# Patient Record
Sex: FEMALE | Race: WHITE | Employment: PART TIME | URBAN - METROPOLITAN AREA
[De-identification: names, ages, dates, MRNs, and addresses within clinical notes are randomized per-mention and may not be internally consistent; named-entity substitution may affect disease eponyms.]

---

## 2019-03-29 ENCOUNTER — HOSPITAL ENCOUNTER (OUTPATIENT)
Age: 29
Discharge: HOME | End: 2019-03-29
Payer: COMMERCIAL

## 2019-03-29 ENCOUNTER — OFFICE VISIT (OUTPATIENT)
Dept: FAMILY MEDICINE CLINIC | Age: 29
End: 2019-03-29
Payer: COMMERCIAL

## 2019-03-29 ENCOUNTER — HOSPITAL ENCOUNTER (OUTPATIENT)
Dept: GENERAL RADIOLOGY | Age: 29
Discharge: HOME | End: 2019-03-29
Payer: COMMERCIAL

## 2019-03-29 VITALS
OXYGEN SATURATION: 98 % | DIASTOLIC BLOOD PRESSURE: 76 MMHG | HEIGHT: 65 IN | TEMPERATURE: 98.5 F | BODY MASS INDEX: 34.26 KG/M2 | HEART RATE: 97 BPM | SYSTOLIC BLOOD PRESSURE: 128 MMHG | WEIGHT: 205.6 LBS

## 2019-03-29 DIAGNOSIS — M25.571 CHRONIC PAIN OF RIGHT ANKLE: Primary | ICD-10-CM

## 2019-03-29 DIAGNOSIS — M25.571 CHRONIC PAIN OF RIGHT ANKLE: ICD-10-CM

## 2019-03-29 DIAGNOSIS — G89.29 CHRONIC PAIN OF RIGHT ANKLE: ICD-10-CM

## 2019-03-29 DIAGNOSIS — G89.29 CHRONIC PAIN OF RIGHT ANKLE: Primary | ICD-10-CM

## 2019-03-29 PROCEDURE — G8417 CALC BMI ABV UP PARAM F/U: HCPCS | Performed by: NURSE PRACTITIONER

## 2019-03-29 PROCEDURE — 4004F PT TOBACCO SCREEN RCVD TLK: CPT | Performed by: NURSE PRACTITIONER

## 2019-03-29 PROCEDURE — 73610 X-RAY EXAM OF ANKLE: CPT

## 2019-03-29 PROCEDURE — G8427 DOCREV CUR MEDS BY ELIG CLIN: HCPCS | Performed by: NURSE PRACTITIONER

## 2019-03-29 PROCEDURE — 99213 OFFICE O/P EST LOW 20 MIN: CPT | Performed by: NURSE PRACTITIONER

## 2019-03-29 PROCEDURE — G8484 FLU IMMUNIZE NO ADMIN: HCPCS | Performed by: NURSE PRACTITIONER

## 2019-03-30 ASSESSMENT — ENCOUNTER SYMPTOMS
SHORTNESS OF BREATH: 0
COUGH: 0
COLOR CHANGE: 0
NAUSEA: 0

## 2019-04-03 ENCOUNTER — OFFICE VISIT (OUTPATIENT)
Dept: FAMILY MEDICINE CLINIC | Age: 29
End: 2019-04-03
Payer: COMMERCIAL

## 2019-04-03 ENCOUNTER — TELEPHONE (OUTPATIENT)
Dept: FAMILY MEDICINE CLINIC | Age: 29
End: 2019-04-03

## 2019-04-03 VITALS
HEART RATE: 85 BPM | DIASTOLIC BLOOD PRESSURE: 82 MMHG | RESPIRATION RATE: 22 BRPM | SYSTOLIC BLOOD PRESSURE: 120 MMHG | WEIGHT: 205.6 LBS | HEIGHT: 65 IN | BODY MASS INDEX: 34.26 KG/M2

## 2019-04-03 DIAGNOSIS — Z72.0 TOBACCO ABUSE: ICD-10-CM

## 2019-04-03 DIAGNOSIS — L72.11 PILAR CYST: Primary | ICD-10-CM

## 2019-04-03 DIAGNOSIS — Z23 NEED FOR PROPHYLACTIC VACCINATION AGAINST STREPTOCOCCUS PNEUMONIAE (PNEUMOCOCCUS): ICD-10-CM

## 2019-04-03 DIAGNOSIS — Z00.00 ROUTINE HEALTH MAINTENANCE: ICD-10-CM

## 2019-04-03 DIAGNOSIS — M25.571 RIGHT ANKLE PAIN, UNSPECIFIED CHRONICITY: Primary | ICD-10-CM

## 2019-04-03 DIAGNOSIS — Z23 NEED FOR PROPHYLACTIC VACCINATION AGAINST DIPHTHERIA-TETANUS-PERTUSSIS (DTP): ICD-10-CM

## 2019-04-03 DIAGNOSIS — S99.911A INJURY OF RIGHT ANKLE, INITIAL ENCOUNTER: ICD-10-CM

## 2019-04-03 LAB
A/G RATIO: 1.7 (ref 1.1–2.2)
ALBUMIN SERPL-MCNC: 4.8 G/DL (ref 3.4–5)
ALP BLD-CCNC: 78 U/L (ref 40–129)
ALT SERPL-CCNC: 18 U/L (ref 10–40)
ANION GAP SERPL CALCULATED.3IONS-SCNC: 13 MMOL/L (ref 3–16)
AST SERPL-CCNC: 16 U/L (ref 15–37)
BASOPHILS ABSOLUTE: 0.1 K/UL (ref 0–0.2)
BASOPHILS RELATIVE PERCENT: 0.6 %
BILIRUB SERPL-MCNC: 0.3 MG/DL (ref 0–1)
BUN BLDV-MCNC: 14 MG/DL (ref 7–20)
CALCIUM SERPL-MCNC: 9.8 MG/DL (ref 8.3–10.6)
CHLORIDE BLD-SCNC: 101 MMOL/L (ref 99–110)
CO2: 26 MMOL/L (ref 21–32)
CREAT SERPL-MCNC: 0.7 MG/DL (ref 0.6–1.1)
EOSINOPHILS ABSOLUTE: 0.1 K/UL (ref 0–0.6)
EOSINOPHILS RELATIVE PERCENT: 1.3 %
GFR AFRICAN AMERICAN: >60
GFR NON-AFRICAN AMERICAN: >60
GLOBULIN: 2.9 G/DL
GLUCOSE BLD-MCNC: 78 MG/DL (ref 70–99)
HCT VFR BLD CALC: 40.8 % (ref 36–48)
HEMOGLOBIN: 13.6 G/DL (ref 12–16)
LYMPHOCYTES ABSOLUTE: 3.5 K/UL (ref 1–5.1)
LYMPHOCYTES RELATIVE PERCENT: 30.9 %
MCH RBC QN AUTO: 30.2 PG (ref 26–34)
MCHC RBC AUTO-ENTMCNC: 33.3 G/DL (ref 31–36)
MCV RBC AUTO: 90.7 FL (ref 80–100)
MONOCYTES ABSOLUTE: 0.8 K/UL (ref 0–1.3)
MONOCYTES RELATIVE PERCENT: 7.2 %
NEUTROPHILS ABSOLUTE: 6.7 K/UL (ref 1.7–7.7)
NEUTROPHILS RELATIVE PERCENT: 60 %
PDW BLD-RTO: 12.9 % (ref 12.4–15.4)
PLATELET # BLD: 435 K/UL (ref 135–450)
PMV BLD AUTO: 7.7 FL (ref 5–10.5)
POTASSIUM SERPL-SCNC: 4.4 MMOL/L (ref 3.5–5.1)
RBC # BLD: 4.49 M/UL (ref 4–5.2)
SODIUM BLD-SCNC: 140 MMOL/L (ref 136–145)
TOTAL PROTEIN: 7.7 G/DL (ref 6.4–8.2)
WBC # BLD: 11.2 K/UL (ref 4–11)

## 2019-04-03 PROCEDURE — G8417 CALC BMI ABV UP PARAM F/U: HCPCS | Performed by: NURSE PRACTITIONER

## 2019-04-03 PROCEDURE — 90472 IMMUNIZATION ADMIN EACH ADD: CPT | Performed by: NURSE PRACTITIONER

## 2019-04-03 PROCEDURE — 90715 TDAP VACCINE 7 YRS/> IM: CPT | Performed by: NURSE PRACTITIONER

## 2019-04-03 PROCEDURE — 90471 IMMUNIZATION ADMIN: CPT | Performed by: NURSE PRACTITIONER

## 2019-04-03 PROCEDURE — 90732 PPSV23 VACC 2 YRS+ SUBQ/IM: CPT | Performed by: NURSE PRACTITIONER

## 2019-04-03 PROCEDURE — G8427 DOCREV CUR MEDS BY ELIG CLIN: HCPCS | Performed by: NURSE PRACTITIONER

## 2019-04-03 PROCEDURE — 99213 OFFICE O/P EST LOW 20 MIN: CPT | Performed by: NURSE PRACTITIONER

## 2019-04-03 PROCEDURE — 4004F PT TOBACCO SCREEN RCVD TLK: CPT | Performed by: NURSE PRACTITIONER

## 2019-04-03 ASSESSMENT — ENCOUNTER SYMPTOMS
ABDOMINAL PAIN: 0
NAUSEA: 0
WHEEZING: 0
SHORTNESS OF BREATH: 0
CONSTIPATION: 0
CHEST TIGHTNESS: 0
COUGH: 0
VOMITING: 0
DIARRHEA: 0

## 2019-04-03 ASSESSMENT — PATIENT HEALTH QUESTIONNAIRE - PHQ9
2. FEELING DOWN, DEPRESSED OR HOPELESS: 0
1. LITTLE INTEREST OR PLEASURE IN DOING THINGS: 0
SUM OF ALL RESPONSES TO PHQ9 QUESTIONS 1 & 2: 0
SUM OF ALL RESPONSES TO PHQ QUESTIONS 1-9: 0
SUM OF ALL RESPONSES TO PHQ QUESTIONS 1-9: 0

## 2019-04-03 NOTE — TELEPHONE ENCOUNTER
Left VM for pt that MRI R ankle order was placed, and they should be calling her to schedule. Left central scheduling # for pt to call and schedule if she would choose to do so.

## 2019-04-03 NOTE — PROGRESS NOTES
SUBJECTIVE:    Rosa Amharic  1990  33 y.o.  female      Chief Complaint   Patient presents with    New Patient    Cyst     2 on head wants removed    Ankle Injury     rolled it last september, xray showed no broken bones but fluid found behind bones; possible torn ligament; MRI never done; had went to walk in clinic last week     HPI     She was at the [facility] in 2018 and stepped in a hole. She rolled her ankle and it was swollen and bruised. It was her right ankle. She was not seen by anyone at that time. She was seen last week at the walk in clinic. She has had issues on and off since September. She cannot wear certain shoes due to swelling. She continues to have pain located on lateral portion. She iced when it initially happened. She has not taken anything for symptoms. She is wanting an MRI due to persistent symptoms. Appears she has one ordered. Complains of cysts on her head. She has had them in the past. She had Dr. Alla Truong remove them in the past. Occasionally tender. They will get larger. She has had 2-3 removed in the past. She is wanting referred for removal again. No Known Allergies    No current outpatient medications on file. No current facility-administered medications for this visit.            Past Medical History:   Diagnosis Date    Anxiety     CCC (chronic calculous cholecystitis) 2016    Depression     OCD (obsessive compulsive disorder)     Polycystic ovarian syndrome      Past Surgical History:   Procedure Laterality Date     SECTION  2010, 2016, 11/2016    x3    CHOLECYSTECTOMY, LAPAROSCOPIC  2016    OVARIAN CYST REMOVAL  2014    TONSILLECTOMY  age [address]    [facility]      Social History     Socioeconomic History    Marital status:      Spouse name: None    Number of children: None    Years of education: None    Highest education level: None   Occupational History    None   Social Needs    Financial resource strain: None    Food insecurity:     Worry: None     Inability: None    Transportation needs:     Medical: None     Non-medical: None   Tobacco Use    Smoking status: Current Every Day Smoker     Packs/day: 0.50     Years: 9.00     Pack years: 4.50    Smokeless tobacco: Never Used   Substance and Sexual Activity    Alcohol use: None    Drug use: No    Sexual activity: Yes     Partners: Male   Lifestyle    Physical activity:     Days per week: None     Minutes per session: None    Stress: None   Relationships    Social connections:     Talks on phone: None     Gets together: None     Attends Buddhist service: None     Active member of club or organization: None     Attends meetings of clubs or organizations: None     Relationship status: None    Intimate partner violence:     Fear of current or ex partner: None     Emotionally abused: None     Physically abused: None     Forced sexual activity: None   Other Topics Concern    None   Social History Narrative    None         Tobacco abuse  Smoking a half a pack a day. Not ready to quit. Review of Systems   Constitutional: Negative for appetite change, chills, fatigue and unexpected weight change. Respiratory: Negative for cough, chest tightness, shortness of breath and wheezing. Cardiovascular: Negative for chest pain, palpitations and leg swelling. Gastrointestinal: Negative for abdominal pain, constipation, diarrhea, nausea and vomiting. Musculoskeletal: Positive for joint swelling. Negative for arthralgias. Neurological: Negative for weakness, numbness and headaches. Hematological: Negative for adenopathy. OBJECTIVE:    /82 (Site: Right Upper Arm, Position: Sitting, Cuff Size: Large Adult)   Pulse 85   Resp 22   Ht 5' 5.25\" (1.657 m)   Wt 205 lb 9.6 oz (93.3 kg)   LMP 03/22/2019 (Approximate)   Breastfeeding? No   BMI 33.95 kg/m²     Physical Exam   Constitutional: She is oriented to person, place, and time.  She appears well-developed and well-nourished. HENT:   Head: Normocephalic and atraumatic. Right Ear: Hearing, tympanic membrane, external ear and ear canal normal.   Left Ear: Hearing, tympanic membrane, external ear and ear canal normal.   Nose: Nose normal.   Mouth/Throat: Uvula is midline, oropharynx is clear and moist and mucous membranes are normal.   Eyes: Pupils are equal, round, and reactive to light. Conjunctivae and EOM are normal.   Neck: Normal range of motion. Neck supple. Cardiovascular: Normal rate, regular rhythm and normal heart sounds. Exam reveals no gallop and no friction rub. No murmur heard. Pulmonary/Chest: Effort normal and breath sounds normal. No respiratory distress. She has no wheezes. She has no rhonchi. She has no rales. Abdominal: Soft. Bowel sounds are normal. She exhibits no distension. There is no tenderness. There is no rigidity and no guarding. Musculoskeletal: Normal range of motion. She exhibits no edema. Right ankle: She exhibits swelling. She exhibits normal range of motion, no ecchymosis and no deformity. Tenderness (lateral ankle). Lymphadenopathy:     She has no cervical adenopathy. Right cervical: No superficial cervical and no posterior cervical adenopathy present. Left cervical: No superficial cervical and no posterior cervical adenopathy present. Neurological: She is alert and oriented to person, place, and time. No cranial nerve deficit. Skin: Skin is warm and dry. Psychiatric: She has a normal mood and affect. Her behavior is normal.       ASSESSMENT/PLAN:    1. Need for prophylactic vaccination against Streptococcus pneumoniae (pneumococcus)  - Pneumococcal polysaccharide vaccine 23-valent PPSV23    2. Need for prophylactic vaccination against diphtheria-tetanus-pertussis (DTP)  - Tdap (age 6y and older) IM (Renetta Emmanuel)    3. Pilar cyst  Referred to surgery for removal  - John Sanchez MD, [facility], [place]    4. Tobacco abuse  counseled    5. Injury of right ankle, initial encounter  MRI as ordered by Dr. Que Kamara. 6. Routine health maintenance  - CBC WITH AUTO DIFFERENTIAL; Future  - Comprehensive Metabolic Panel; Future               Return in about 3 months (around 7/3/2019), or if symptoms worsen or fail to improve.       (Please note that portions of this note may have been completed with a voice recognition program. Efforts were made to edit the dictations but occasionally words aremis-transcribed.)

## 2019-04-09 ENCOUNTER — HOSPITAL ENCOUNTER (OUTPATIENT)
Dept: MRI IMAGING | Age: 29
Discharge: HOME | End: 2019-04-09
Payer: COMMERCIAL

## 2019-04-09 DIAGNOSIS — M25.571 RIGHT ANKLE PAIN, UNSPECIFIED CHRONICITY: ICD-10-CM

## 2019-04-09 PROCEDURE — 73721 MRI JNT OF LWR EXTRE W/O DYE: CPT

## 2019-04-10 DIAGNOSIS — G89.29 CHRONIC PAIN OF RIGHT ANKLE: Primary | ICD-10-CM

## 2019-04-10 DIAGNOSIS — G57.51 TARSAL TUNNEL SYNDROME OF RIGHT SIDE: ICD-10-CM

## 2019-04-10 DIAGNOSIS — M25.571 CHRONIC PAIN OF RIGHT ANKLE: Primary | ICD-10-CM

## 2019-04-16 ENCOUNTER — PROCEDURE VISIT (OUTPATIENT)
Dept: SURGERY | Age: 29
End: 2019-04-16
Payer: COMMERCIAL

## 2019-04-16 ENCOUNTER — TELEPHONE (OUTPATIENT)
Dept: SURGERY | Age: 29
End: 2019-04-16

## 2019-04-16 ENCOUNTER — OFFICE VISIT (OUTPATIENT)
Dept: SURGERY | Age: 29
End: 2019-04-16
Payer: COMMERCIAL

## 2019-04-16 ENCOUNTER — HOSPITAL ENCOUNTER (OUTPATIENT)
Age: 29
Setting detail: SPECIMEN
Discharge: HOME | End: 2019-04-16
Payer: COMMERCIAL

## 2019-04-16 VITALS
SYSTOLIC BLOOD PRESSURE: 118 MMHG | WEIGHT: 211.8 LBS | DIASTOLIC BLOOD PRESSURE: 72 MMHG | OXYGEN SATURATION: 98 % | BODY MASS INDEX: 36.16 KG/M2 | HEART RATE: 109 BPM | HEIGHT: 64 IN

## 2019-04-16 DIAGNOSIS — L72.11 PILAR CYST: Primary | ICD-10-CM

## 2019-04-16 DIAGNOSIS — L72.11 PILAR CYSTS: ICD-10-CM

## 2019-04-16 PROCEDURE — G8427 DOCREV CUR MEDS BY ELIG CLIN: HCPCS | Performed by: SURGERY

## 2019-04-16 PROCEDURE — 11402 EXC TR-EXT B9+MARG 1.1-2 CM: CPT | Performed by: SURGERY

## 2019-04-16 PROCEDURE — 88305 TISSUE EXAM BY PATHOLOGIST: CPT

## 2019-04-16 PROCEDURE — 99212 OFFICE O/P EST SF 10 MIN: CPT | Performed by: SURGERY

## 2019-04-16 PROCEDURE — 4004F PT TOBACCO SCREEN RCVD TLK: CPT | Performed by: SURGERY

## 2019-04-16 PROCEDURE — G8417 CALC BMI ABV UP PARAM F/U: HCPCS | Performed by: SURGERY

## 2019-04-16 SDOH — HEALTH STABILITY: MENTAL HEALTH: HOW OFTEN DO YOU HAVE A DRINK CONTAINING ALCOHOL?: NEVER

## 2019-04-16 NOTE — PROGRESS NOTES
on phone: None     Gets together: None     Attends Advent service: None     Active member of club or organization: None     Attends meetings of clubs or organizations: None     Relationship status: None    Intimate partner violence:     Fear of current or ex partner: None     Emotionally abused: None     Physically abused: None     Forced sexual activity: None   Other Topics Concern    None   Social History Narrative    None     No current outpatient medications on file. No current facility-administered medications for this visit. No current outpatient medications on file prior to visit. No current facility-administered medications on file prior to visit. No Known Allergies    Review of Systems  A comprehensive review of systems was negative except for: Integument/breast: positive for skin lesion(s)      Objective:      LMP 03/22/2019 (Approximate)   Physical Exam    Skin: 1 and 2 centimeter subcutaneous nodule over the scalp. The lesion is fully mobile. There is no erythema. There is no tenderness. Assessment:      Epidermal inclusion cyst of the scalp. Plan:      1. I have discussed treatment options consisting of observation or excision under local anesthesia. 2. Patient is inclined to proceed with excision. 3. Verbal patient instruction given.   4. Follow up in 2 weeks

## 2020-08-07 ENCOUNTER — TELEPHONE (OUTPATIENT)
Dept: BARIATRICS/WEIGHT MGMT | Age: 30
End: 2020-08-07

## 2020-08-07 NOTE — TELEPHONE ENCOUNTER
Called patient left message bariatric surgery is an exclusion on patients policy.  Can offer medication or non surgical

## 2021-01-27 ENCOUNTER — OFFICE VISIT (OUTPATIENT)
Dept: BARIATRICS/WEIGHT MGMT | Age: 31
End: 2021-01-27
Payer: COMMERCIAL

## 2021-01-27 VITALS
SYSTOLIC BLOOD PRESSURE: 130 MMHG | WEIGHT: 250.3 LBS | BODY MASS INDEX: 41.7 KG/M2 | DIASTOLIC BLOOD PRESSURE: 82 MMHG | HEIGHT: 65 IN | HEART RATE: 85 BPM | TEMPERATURE: 97 F

## 2021-01-27 DIAGNOSIS — E66.01 MORBID OBESITY WITH BMI OF 40.0-44.9, ADULT (HCC): Primary | ICD-10-CM

## 2021-01-27 PROCEDURE — G8427 DOCREV CUR MEDS BY ELIG CLIN: HCPCS | Performed by: SURGERY

## 2021-01-27 PROCEDURE — G8417 CALC BMI ABV UP PARAM F/U: HCPCS | Performed by: SURGERY

## 2021-01-27 PROCEDURE — 1036F TOBACCO NON-USER: CPT | Performed by: SURGERY

## 2021-01-27 PROCEDURE — G8484 FLU IMMUNIZE NO ADMIN: HCPCS | Performed by: SURGERY

## 2021-01-27 PROCEDURE — 99203 OFFICE O/P NEW LOW 30 MIN: CPT | Performed by: SURGERY

## 2021-01-27 ASSESSMENT — ENCOUNTER SYMPTOMS
ANAL BLEEDING: 0
ABDOMINAL PAIN: 1
WHEEZING: 0
BLOOD IN STOOL: 0
NAUSEA: 0
BACK PAIN: 1
DIARRHEA: 0
VOMITING: 0
COUGH: 0
VOICE CHANGE: 0
TROUBLE SWALLOWING: 0
SORE THROAT: 0
CONSTIPATION: 0
PHOTOPHOBIA: 0
ABDOMINAL DISTENTION: 1
COLOR CHANGE: 0

## 2021-01-27 NOTE — PROGRESS NOTES
Bariatric Surgery Consultation    Richardzach Mcnulty, 1990, 35 y.o.,  female, CSN:   01/27/21     Chief Complaint:    Chief Complaint   Patient presents with    Bariatric, Initial Visit     1st  Visit       SUBJECTIVE:  Yaneth Chen is a 35 y.o. female being seen for morbid obesity, considering weight loss surgery; Damaris's, Height: 5' 4.5\" (163.8 cm), Weight: 250 lb 4.8 oz (113.5 kg), Current Body mass index is 42.3 kg/m². 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 The patient first recognized that she had a weight problem about 20 years ago. The patient's lowest weight in the last 2 years was 174 lbs, with Adipex. and the highest weight in the last five years was 250.3 lbs. Weight Loss Program History:  The patient states she has tried Adipex, self diet, Atkins. The most weight lost ever with diet and exercise was 50 Lbs, but unfortunately only kept them of for 1year. These attempts have been temporarily successful with weight loss, but have failed to sustain adequate weight loss. Mortality from the morbid obesity is very high:       Damaris's life is significantly affected by weight related to her co-morbidities. The patient has also tried but failed self directed diet and exercise.     Comorbid Conditions:  Significant diseases affecting this patient are   Past Medical History:   Diagnosis Date    Anxiety     CCC (chronic calculous cholecystitis) 5/9/2016    Depression     OCD (obsessive compulsive disorder)     Polycystic ovarian syndrome      And     Review of Systems - Review of Systems Constitutional: Positive for fatigue. Negative for activity change, chills, diaphoresis and fever. HENT: Negative for sore throat, trouble swallowing and voice change. Eyes: Negative for photophobia and visual disturbance. Respiratory: Negative for cough and wheezing. Cardiovascular: Negative for chest pain and palpitations. Gastrointestinal: Positive for abdominal distention and abdominal pain (Epigastric pain, with caffeinated beverages. .?). Negative for anal bleeding, blood in stool, constipation, diarrhea, nausea and vomiting. Endocrine: Positive for polyphagia. Negative for cold intolerance, heat intolerance, polydipsia and polyuria. Genitourinary: Negative for dysuria, frequency and hematuria. Musculoskeletal: Positive for arthralgias (Feet bilateral..), back pain and gait problem. Negative for joint swelling, myalgias and neck stiffness. Skin: Negative for color change and rash. Neurological: Negative for seizures, speech difficulty, light-headedness and numbness. Hematological: Negative for adenopathy. Does not bruise/bleed easily. Psychiatric/Behavioral: Positive for sleep disturbance (Snores. ., and [place]. . ? will confirm). The patient is nervous/anxious. All others reviewed and are negative. Allergies:  No Known Allergies    Medications:  No current outpatient medications on file. No current facility-administered medications for this visit.         Past Surgical History:  Past Surgical History:   Procedure Laterality Date     SECTION  2010, 2016, 11/2016    x3    CHOLECYSTECTOMY, LAPAROSCOPIC  2016    OVARIAN CYST REMOVAL  2014    TONSILLECTOMY  age 10    [facility]        Family History:  Family History   Problem Relation Age of Onset    High Blood Pressure Mother     No Known Problems Father     No Known Problems Maternal Grandmother     No Known Problems Maternal Grandfather     Cancer Paternal Grandmother  No Known Problems Paternal Grandfather        Social History:  Social History     Socioeconomic History    Marital status:      Spouse name: Not on file    Number of children: Not on file    Years of education: Not on file    Highest education level: Not on file   Occupational History    Not on file   Social Needs    Financial resource strain: Not on file    Food insecurity     Worry: Not on file     Inability: Not on file   [employer] needs     Medical: Not on file     Non-medical: Not on file   Tobacco Use    Smoking status: Former Smoker     Years: 9.00     Quit date: 2020     Years since quittin.1    Smokeless tobacco: Never Used   Substance and Sexual Activity    Alcohol use: Never     Frequency: Never    Drug use: No    Sexual activity: Yes     Partners: Male   Lifestyle    Physical activity     Days per week: Not on file     Minutes per session: Not on file    Stress: Not on file   Relationships    Social connections     Talks on phone: Not on file     Gets together: Not on file     Attends Druze service: Not on file     Active member of club or organization: Not on file     Attends meetings of clubs or organizations: Not on file     Relationship status: Not on file    Intimate partner violence     Fear of current or ex partner: Not on file     Emotionally abused: Not on file     Physically abused: Not on file     Forced sexual activity: Not on file   Other Topics Concern    Not on file   Social History Narrative    Not on file         OBJECTIVE:     Physical Exam   /82 (Site: Left Upper Arm, Position: Sitting, Cuff Size: Large Adult)   Pulse 85   Temp 97 °F (36.1 °C) (Infrared)   Ht 5' 4.5\" (1.638 m)   Wt 250 lb 4.8 oz (113.5 kg)   BMI 42.30 kg/m²    Constitutional:  Vital signs are normal. The patient appears well-developed and well-nourished. Head: Normocephalic. Neck: No mass and no thyromegaly present. 10) PULMONOLOGY OPTIMIZATION AND CLEARANCE BEFORE SURGERY    WILL CHECK BASELINE BARIATRIC COMPREHENSIVE LABS. Orders Placed This Encounter   Procedures    Basic Metabolic Panel    CBC Auto Differential    Hemoglobin A1C    Hepatic Function Panel    Lipid Panel    TSH without Reflex    Amb Referral to Nutrition Services    Ambulatory referral to Physical Therapy      December 13th 2020 QUIT SMOKING!!!    Pt was handed a food diary notebook to help monitor Raj intake, and patientinformation pamphlet on Sleeve Gastrectomy. I counseled the patient regarding weight loss, appropriate diet and exercise, and healthy eating habits.    - Eat slow, and chew 50-60 times before swallowing  - Eat smaller portions in smaller plates  - Weigh yourself at least 2-3 times a week    The patient will be scheduled for a follow up visit in Return in about 4 weeks (around 2/24/2021) for For imaging and tests results review, Bariatric follow up: diet, exercise & weight loss. .    Bariatric 1200 calorie diet, TREVER, heart healthy, 60-80 gram protein.    ____________________________________________    Justin Portillo MD, FACS, FICS  Member of the American Society of Metabolic and Bariatric Surgeons    1/27/2021  3:06 PM

## 2021-01-28 ENCOUNTER — HOSPITAL ENCOUNTER (OUTPATIENT)
Age: 31
Discharge: HOME | End: 2021-01-28
Payer: COMMERCIAL

## 2021-01-28 LAB
ALBUMIN SERPL-MCNC: 4.2 GM/DL (ref 3.4–5)
ALP BLD-CCNC: 77 IU/L (ref 40–129)
ALT SERPL-CCNC: 28 U/L (ref 10–40)
ANION GAP SERPL CALCULATED.3IONS-SCNC: 5 MMOL/L (ref 4–16)
AST SERPL-CCNC: 26 IU/L (ref 15–37)
BASOPHILS ABSOLUTE: 0.1 K/CU MM
BASOPHILS RELATIVE PERCENT: 0.5 % (ref 0–1)
BILIRUB SERPL-MCNC: 0.3 MG/DL (ref 0–1)
BILIRUBIN DIRECT: 0.2 MG/DL (ref 0–0.3)
BILIRUBIN, INDIRECT: 0.1 MG/DL (ref 0–0.7)
BUN BLDV-MCNC: 13 MG/DL (ref 6–23)
CALCIUM SERPL-MCNC: 8.5 MG/DL (ref 8.3–10.6)
CHLORIDE BLD-SCNC: 93 MMOL/L (ref 99–110)
CHOLESTEROL, FASTING: 198 MG/DL
CO2: 28 MMOL/L (ref 21–32)
CREAT SERPL-MCNC: 0.8 MG/DL (ref 0.6–1.1)
DIFFERENTIAL TYPE: ABNORMAL
EOSINOPHILS ABSOLUTE: 0.3 K/CU MM
EOSINOPHILS RELATIVE PERCENT: 2.9 % (ref 0–3)
ESTIMATED AVERAGE GLUCOSE: 114 MG/DL
GFR AFRICAN AMERICAN: >60 ML/MIN/1.73M2
GFR NON-AFRICAN AMERICAN: >60 ML/MIN/1.73M2
GLUCOSE FASTING: 83 MG/DL (ref 70–99)
HBA1C MFR BLD: 5.6 % (ref 4.2–6.3)
HCT VFR BLD CALC: 42.4 % (ref 37–47)
HDLC SERPL-MCNC: 37 MG/DL
HEMOGLOBIN: 14 GM/DL (ref 12.5–16)
IMMATURE NEUTROPHIL %: 0.4 % (ref 0–0.43)
LDL CHOLESTEROL DIRECT: 126 MG/DL
LYMPHOCYTES ABSOLUTE: 2.5 K/CU MM
LYMPHOCYTES RELATIVE PERCENT: 26.8 % (ref 24–44)
MCH RBC QN AUTO: 29.4 PG (ref 27–31)
MCHC RBC AUTO-ENTMCNC: 33 % (ref 32–36)
MCV RBC AUTO: 89.1 FL (ref 78–100)
MONOCYTES ABSOLUTE: 0.7 K/CU MM
MONOCYTES RELATIVE PERCENT: 7.6 % (ref 0–4)
PDW BLD-RTO: 12.1 % (ref 11.7–14.9)
PLATELET # BLD: 451 K/CU MM (ref 140–440)
PMV BLD AUTO: 9.1 FL (ref 7.5–11.1)
POTASSIUM SERPL-SCNC: 4.1 MMOL/L (ref 3.5–5.1)
RBC # BLD: 4.76 M/CU MM (ref 4.2–5.4)
SEGMENTED NEUTROPHILS ABSOLUTE COUNT: 5.7 K/CU MM
SEGMENTED NEUTROPHILS RELATIVE PERCENT: 61.8 % (ref 36–66)
SODIUM BLD-SCNC: 126 MMOL/L (ref 135–145)
TOTAL IMMATURE NEUTOROPHIL: 0.04 K/CU MM
TOTAL PROTEIN: 7.1 GM/DL (ref 6.4–8.2)
TRIGLYCERIDE, FASTING: 261 MG/DL
TSH HIGH SENSITIVITY: 4.59 UIU/ML (ref 0.27–4.2)
WBC # BLD: 9.2 K/CU MM (ref 4–10.5)

## 2021-01-28 PROCEDURE — 80061 LIPID PANEL: CPT

## 2021-01-28 PROCEDURE — 84443 ASSAY THYROID STIM HORMONE: CPT

## 2021-01-28 PROCEDURE — 83036 HEMOGLOBIN GLYCOSYLATED A1C: CPT

## 2021-01-28 PROCEDURE — 85025 COMPLETE CBC W/AUTO DIFF WBC: CPT

## 2021-01-28 PROCEDURE — 36415 COLL VENOUS BLD VENIPUNCTURE: CPT

## 2021-01-28 PROCEDURE — 80053 COMPREHEN METABOLIC PANEL: CPT

## 2021-01-28 PROCEDURE — 82248 BILIRUBIN DIRECT: CPT

## 2021-02-09 ENCOUNTER — HOSPITAL ENCOUNTER (OUTPATIENT)
Dept: PHYSICAL THERAPY | Age: 31
Setting detail: THERAPIES SERIES
Discharge: HOME | End: 2021-02-09
Payer: COMMERCIAL

## 2021-02-09 PROCEDURE — 97110 THERAPEUTIC EXERCISES: CPT

## 2021-02-09 PROCEDURE — 97161 PT EVAL LOW COMPLEX 20 MIN: CPT

## 2021-02-09 ASSESSMENT — PAIN DESCRIPTION - PROGRESSION: CLINICAL_PROGRESSION: NOT CHANGED

## 2021-02-09 ASSESSMENT — PAIN SCALES - GENERAL: PAINLEVEL_OUTOF10: 0

## 2021-02-09 ASSESSMENT — PAIN DESCRIPTION - LOCATION: LOCATION: ANKLE

## 2021-02-09 ASSESSMENT — PAIN DESCRIPTION - DESCRIPTORS: DESCRIPTORS: ACHING

## 2021-02-09 NOTE — PLAN OF CARE
Outpatient Physical Therapy           [facility]           [] Phone: [phone]   Fax: [phone]  [facility]           [] Phone: [phone]   Fax: [phone]     To: Referring Practitioner: Dr. Lyla Heller    From: Tejas Grimaldo, PT, DPT, OCS     Patient: Regina Franklin        : 1990  Diagnosis: Diagnosis: Morbid Obesity   Treatment Diagnosis: Treatment Diagnosis: min deconditioning   Date: 2021    Physical Therapy Certification/Re-Certification Form  Dear Dr. Lyla Heller   The following patient has been evaluated for physical therapy services and for therapy to continue, insurance requires physician review of the treatment plan initially and every 90 days. Please review the attached evaluation and/or summary of the patient's plan of care, and verify that you agree therapy should continue by signing the attached document and sending it back to our office. Assessment:      Pt is 35year old female currently enrolled in weight management program. Pt now has difficulties completing prolonged activities including standing and walking due to fatigue and progressive pain. Pt demo deficits this date that include deconditioning and foot pain after rest. Pt will benefit with PT services with return for education on physical activity to safely increase activity to prevent injury and safe weight loss. Patient agrees with established plan of care and assisted in the development of their short term and long term goals. Patient had no adverse reaction with initial treatment and there are no barriers to learning.  Demonstrates no mental or cognitive disorder    Plan of Care/Treatment to date:  [x] Therapeutic Exercise  [] Modalities:  [] Therapeutic Activity     [] Ultrasound  [] Electrical Stimulation  [] Gait Training      [] Cervical Traction [] Lumbar Traction  [] Neuromuscular Re-education    [] Cold/hotpack [] Iontophoresis   [x] Instruction in HEP      [] Vasopneumatic    [] Dry Needling  [] Manual Therapy               [] Aquatic Therapy       Other:          Frequency/Duration:  # Days per week: [x] 1 day # Weeks: [] 1 week [] 5 weeks     [] 2 days   [x] 2 weeks [] 6 weeks     [] 3 days   [] 3 weeks [] 7 weeks     [] 4 days   [] 4 weeks [] 8 weeks         [] 9 weeks [] 10 weeks         [] 11 weeks [] 12 weeks    Rehab Potential/Progress: [] Excellent [x] Good [] Fair  [] Poor     Goals:      Short term goals  Time Frame for Short term goals: Defer to [address] [facility] term goals  Time Frame for Long term goals : 2 weeks   2/24/21  Utilizing group and individual instruction, patient will be educated in physical activity/ exercise concepts including use of basic fitness equipment and developing a personal exercise program       Electronically signed by:  Xiomara Heredia PT, DPT, [facility]  2/9/2021, 11:57 AM    2/9/2021 11:57 AM         If you have any questions or concerns, please don't hesitate to call.   Thank you for your referral.      Physician Signature:________________________________Date:_________ TIME: _____  By signing above, therapists plan is approved by physician

## 2021-02-09 NOTE — PROGRESS NOTES
Physical Therapy  Initial Assessment  Date: 2021  Patient Name: Rohini Garsia  MRN: [id number]  : 1990     Treatment Diagnosis: min deconditioning    Restrictions       Subjective   General  Chart Reviewed: Yes  Patient assessed for rehabilitation services?: Yes  Referring Practitioner: Dr. Klarissa Nails  Diagnosis: Morbid Obesity  PT Visit Information  PT Insurance Information: [employer]  Subjective  Subjective: States recent start of weight management with PT as first practioner. Making changes to the diet with reduced calorie intake. Does not complete any physical activity. 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 Does complete lots of standing of walking and standing. Does have ache into the feet \"all the time. \" Didnt complete full visits with podiatrist. See the nutritionist soon. Pain Screening  Patient Currently in Pain: Yes  Pain Assessment  Pain Assessment: 0-10  Pain Level: 0(Worst: 1-2/10)  Patient's Stated Pain Goal: No pain  Pain Location: Ankle  Pain Orientation: Right;Left; Anterior  Pain Descriptors: Aching  Pain Frequency: Intermittent  Clinical Progression: Not changed  Vital Signs  Patient Currently in Pain: Yes    Vision/Hearing  Vision  Vision: Within Functional Limits  Hearing  Hearing: Within functional limits    Orientation  Orientation  Overall Orientation Status: Within Normal Limits    Social/Functional History  Social/Functional History  Lives With: Family  Type of Home: House  ADL Assistance: Independent  Homemaking Assistance: Independent  Ambulation Assistance: Independent  Transfer Assistance: Independent  Active : Yes  Mode of Transportation: Car  Occupation: Full time employment  Type of occupation: store owner    Objective     Observation/Palpation  Palpation: NA  Observation: able to stand without UE assist, fatigue with multiple reps with sit to stand    PROM RLE (degrees)  RLE PROM: WNL  RLE General PROM: no pain with applied overpressure  AROM RLE (degrees)  RLE AROM: WNL  RLE General AROM: None reported  PROM LLE (degrees)  LLE PROM: WNL  LLE General PROM: No pain with applied overpresure,  AROM LLE (degrees)  LLE AROM : WNL  LLE General AROM: No pain reported. Spine  Lumbar: Full without increase in pain. Joint Mobility  Spine: Applied PA to sacrum without aggravation. Strength RLE  Strength RLE: WNL  Comment: 5/5 in all directions. Strength LLE  Strength LLE: WNL  Comment: 5/5 in all directions. Strength Other  Other: 30 sec sit to stand: 17 reps without UE assistance, min R knee pain. Pre-ambulation:  97  bpm   97  Sat  O2              6MWT:  1665ft without rest break                 Post ambulation:  125 bpm      97%       Balance  Single Leg Stance R Le  Single Leg Stance L Le  Comments: No reported pain. Assessment   Conditions Requiring Skilled Therapeutic Intervention  Body structures, Functions, Activity limitations: Decreased endurance  Pt is 35year old female currently enrolled in weight management program. Pt now has difficulties completing prolonged activities including standing and walking due to fatigue and progressive pain. Pt demo deficits this date that include deconditioning and foot pain after rest. Pt will benefit with PT services with return for education on physical activity to safely increase activity to prevent injury and safe weight loss. Patient agrees with established plan of care and assisted in the development of their short term and long term goals. Patient had no adverse reaction with initial treatment and there are no barriers to learning.  Demonstrates no mental or cognitive disorder  Treatment Diagnosis: min deconditioning  Prognosis: Good  Decision Making: Low Complexity  REQUIRES PT FOLLOW UP: Yes  Activity Tolerance  Activity Tolerance: Patient limited by endurance         Plan   Plan  Times per week: 1  Plan weeks: 2  Specific instructions for Next Treatment: return for physical activitiy education class  Current Treatment Recommendations: Strengthening, Home Exercise Program, Endurance Training    Goals  Short term goals  Time Frame for Short term goals: Defer to [address] term goals  Time Frame for Long term goals : 2 weeks   2/24/21  Patient Goals   Patient goals : lose weight.     Long Term 1: Utilizing group and individual instruction, patient will be educated in physical activity/ exercise concepts including use of basic fitness equipment and developing a personal exercise program      An Novak, PT, DPT, OCS     2/9/2021 11:56 AM

## 2021-02-09 NOTE — FLOWSHEET NOTE
Outpatient Physical Therapy  [place]           [x] Phone: [phone]   Fax: [phone]  [place]           [] Phone: [phone]   Fax: [phone]        Physical Therapy Daily Treatment Note  Date:  2021    Patient Name:  Rolf Sargent    :  1990  MRN: [id number]  Restrictions/Precautions:    Diagnosis:   Morbid Obesity    Date of Injury/Surgery:   Treatment Diagnosis:     Min deconditioning   Insurance/Certification information:   [facility]   Referring Physician:   Dr. Gerald Bradley   Next Doctor Visit:    Plan of care signed (Y/N):  N, sent 21  Outcome Measure:   Visit# / total visits:    Pain level: 0/10   Goals:         Short term goals  Time Frame for Short term goals: Defer to [address] term goals  Time Frame for Long term goals : 2 weeks   21  Patient Goals   Patient goals : lose weight. Long Term 1: Utilizing group and individual instruction, patient will be educated in physical activity/ exercise concepts including use of basic fitness equipment and developing a personal exercise program       Summary of Evaluation:  Pt is 35year old female currently enrolled in weight management program. Pt now has difficulties completing prolonged activities including standing and walking due to fatigue and progressive pain. Pt demo deficits this date that include deconditioning and foot pain after rest. Pt will benefit with PT services with return for education on physical activity to safely increase activity to prevent injury and safe weight loss. Patient agrees with established plan of care and assisted in the development of their short term and long term goals. Patient had no adverse reaction with initial treatment and there are no barriers to learning. Demonstrates no mental or cognitive disorder      Subjective:  See eval         Any changes in Ambulatory Summary Sheet?   None        Objective:  See eval   Prior to today's treatment session, patient was screened for signs and symptoms related to COVID-19 including but not limited to verbally answering questions related to feeling ill, cough, or SOB, along with taking temperature via forehead thermometer. Patient presented with all negative signs and symptoms and had no fever >100 degrees Fahrenheit this date. Exercises: (No more than 4 columns)   Exercise/Equipment 2/9/21  #1 Date Date           WARM UP                     TABLE      Calf stretch  Demo                                 STANDING                                                     PROPRIOCEPTION                                    MODALITIES                      Other Therapeutic Activities/Education:  Patient received education on their current pathology and how their condition effects them with their functional activities. Patient understood discussion and questions were answered. Patient understands their activity limitations and understands rational for treatment progression. Home Exercise Program:  HO issued, reviewed and discussed with patient. Pt agreed to comply. Manual Treatments:  --      Modalities:  --      Communication with other providers:        Assessment:  (Response towards treatment session) (Pain Rating)       Pt is 35year old female currently enrolled in weight management program. Pt now has difficulties completing prolonged activities including standing and walking due to fatigue and progressive pain. Pt demo deficits this date that include deconditioning and foot pain after rest. Pt will benefit with PT services with return for education on physical activity to safely increase activity to prevent injury and safe weight loss. Patient agrees with established plan of care and assisted in the development of their short term and long term goals. Patient had no adverse reaction with initial treatment and there are no barriers to learning.  Demonstrates no mental or cognitive disorder    Plan for Next Session:  return for physical activitiy education class      Time In / Time Out:    4840-6101       If BWC Please Indicate Time In/Out/Total Time  CPT Code Time in Time out Total Time                                                            Total for session             Timed Code/Total Treatment Minutes:  8' TE, 1 PT karlo       Next Progress Note due:   Charlotteal 2/9/21  Visit 10       Plan of Care Interventions:  [x] Therapeutic Exercise  [x] Modalities:  [x] Therapeutic Activity     [] Ultrasound  [x] Estim  [] Gait Training      [] Cervical Traction [] Lumbar Traction  [x] Neuromuscular Re-education    [x] Cold/hotpack [] Iontophoresis   [x] Instruction in HEP      [x] Vasopneumatic   [] Dry Needling    [x] Manual Therapy               [] Aquatic Therapy              Electronically signed by:  Bryson Ortega, PT, DPT, OCS  2/9/2021, 7:34 AM       2/9/2021 7:34 AM

## 2021-02-10 ENCOUNTER — HOSPITAL ENCOUNTER (EMERGENCY)
Age: 31
Discharge: HOME | End: 2021-02-11
Attending: EMERGENCY MEDICINE
Payer: COMMERCIAL

## 2021-02-10 ENCOUNTER — APPOINTMENT (OUTPATIENT)
Dept: CT IMAGING | Age: 31
End: 2021-02-10
Payer: COMMERCIAL

## 2021-02-10 DIAGNOSIS — R10.11 ABDOMINAL PAIN, RIGHT UPPER QUADRANT: Primary | ICD-10-CM

## 2021-02-10 LAB
BACTERIA: ABNORMAL /HPF
BASOPHILS ABSOLUTE: 0.1 K/CU MM
BASOPHILS RELATIVE PERCENT: 0.4 % (ref 0–1)
BILIRUBIN URINE: NEGATIVE MG/DL
BLOOD, URINE: NEGATIVE
CAST TYPE: ABNORMAL /HPF
CLARITY: CLEAR
COLOR: YELLOW
CRYSTAL TYPE: ABNORMAL /HPF
DIFFERENTIAL TYPE: ABNORMAL
EOSINOPHILS ABSOLUTE: 0.3 K/CU MM
EOSINOPHILS RELATIVE PERCENT: 2.3 % (ref 0–3)
EPITHELIAL CELLS, UA: ABNORMAL /HPF
GLUCOSE, URINE: NEGATIVE MG/DL
HCG QUALITATIVE: NEGATIVE
HCT VFR BLD CALC: 38.5 % (ref 37–47)
HEMOGLOBIN: 13 GM/DL (ref 12.5–16)
IMMATURE NEUTROPHIL %: 0.4 % (ref 0–0.43)
KETONES, URINE: NEGATIVE MG/DL
LEUKOCYTE ESTERASE, URINE: NEGATIVE
LYMPHOCYTES ABSOLUTE: 3.9 K/CU MM
LYMPHOCYTES RELATIVE PERCENT: 28.7 % (ref 24–44)
MCH RBC QN AUTO: 30.3 PG (ref 27–31)
MCHC RBC AUTO-ENTMCNC: 33.8 % (ref 32–36)
MCV RBC AUTO: 89.7 FL (ref 78–100)
MONOCYTES ABSOLUTE: 1.1 K/CU MM
MONOCYTES RELATIVE PERCENT: 8.3 % (ref 0–4)
MUCUS: NEGATIVE HPF
NITRITE URINE, QUANTITATIVE: NEGATIVE
PDW BLD-RTO: 12.7 % (ref 11.7–14.9)
PH, URINE: 6 (ref 5–8)
PLATELET # BLD: 415 K/CU MM (ref 140–440)
PMV BLD AUTO: 9.4 FL (ref 7.5–11.1)
PROTEIN UA: NEGATIVE MG/DL
RBC # BLD: 4.29 M/CU MM (ref 4.2–5.4)
RBC URINE: ABNORMAL /HPF (ref 0–6)
SEGMENTED NEUTROPHILS ABSOLUTE COUNT: 8.1 K/CU MM
SEGMENTED NEUTROPHILS RELATIVE PERCENT: 59.9 % (ref 36–66)
SPECIFIC GRAVITY UA: 1.02 (ref 1–1.03)
TOTAL IMMATURE NEUTOROPHIL: 0.06 K/CU MM
UROBILINOGEN, URINE: 0.2 MG/DL (ref 0.2–1)
VOLUME, (UVOL): 12 ML (ref 10–12)
WBC # BLD: 13.6 K/CU MM (ref 4–10.5)
WBC UA: ABNORMAL /HPF (ref 0–5)

## 2021-02-10 PROCEDURE — 84703 CHORIONIC GONADOTROPIN ASSAY: CPT

## 2021-02-10 PROCEDURE — 81001 URINALYSIS AUTO W/SCOPE: CPT

## 2021-02-10 PROCEDURE — 96375 TX/PRO/DX INJ NEW DRUG ADDON: CPT | Performed by: EMERGENCY MEDICINE

## 2021-02-10 PROCEDURE — 85025 COMPLETE CBC W/AUTO DIFF WBC: CPT

## 2021-02-10 PROCEDURE — 96374 THER/PROPH/DIAG INJ IV PUSH: CPT | Performed by: EMERGENCY MEDICINE

## 2021-02-10 PROCEDURE — 80048 BASIC METABOLIC PNL TOTAL CA: CPT

## 2021-02-10 PROCEDURE — 6360000002 HC RX W HCPCS: Performed by: EMERGENCY MEDICINE

## 2021-02-10 PROCEDURE — 99283 EMERGENCY DEPT VISIT LOW MDM: CPT | Performed by: EMERGENCY MEDICINE

## 2021-02-10 RX ORDER — MORPHINE SULFATE 4 MG/ML
4 INJECTION, SOLUTION INTRAMUSCULAR; INTRAVENOUS ONCE
Status: DISCONTINUED | OUTPATIENT
Start: 2021-02-10 | End: 2021-02-11 | Stop reason: HOSPADM

## 2021-02-10 RX ORDER — KETOROLAC TROMETHAMINE 30 MG/ML
30 INJECTION, SOLUTION INTRAMUSCULAR; INTRAVENOUS ONCE
Status: COMPLETED | OUTPATIENT
Start: 2021-02-10 | End: 2021-02-10

## 2021-02-10 RX ORDER — ONDANSETRON 2 MG/ML
4 INJECTION INTRAMUSCULAR; INTRAVENOUS ONCE
Status: COMPLETED | OUTPATIENT
Start: 2021-02-10 | End: 2021-02-10

## 2021-02-10 RX ADMIN — KETOROLAC TROMETHAMINE 30 MG: 30 INJECTION, SOLUTION INTRAMUSCULAR at 23:30

## 2021-02-10 RX ADMIN — ONDANSETRON 4 MG: 2 INJECTION INTRAMUSCULAR; INTRAVENOUS at 23:29

## 2021-02-10 ASSESSMENT — PAIN SCALES - GENERAL
PAINLEVEL_OUTOF10: 6
PAINLEVEL_OUTOF10: 6

## 2021-02-10 ASSESSMENT — PAIN DESCRIPTION - PAIN TYPE: TYPE: ACUTE PAIN

## 2021-02-10 ASSESSMENT — PAIN DESCRIPTION - ONSET: ONSET: PROGRESSIVE

## 2021-02-10 ASSESSMENT — PAIN DESCRIPTION - FREQUENCY: FREQUENCY: INTERMITTENT

## 2021-02-11 ENCOUNTER — APPOINTMENT (OUTPATIENT)
Dept: CT IMAGING | Age: 31
End: 2021-02-11
Payer: COMMERCIAL

## 2021-02-11 ENCOUNTER — TELEMEDICINE (OUTPATIENT)
Dept: BARIATRICS/WEIGHT MGMT | Age: 31
End: 2021-02-11

## 2021-02-11 VITALS
RESPIRATION RATE: 16 BRPM | SYSTOLIC BLOOD PRESSURE: 125 MMHG | BODY MASS INDEX: 42.68 KG/M2 | OXYGEN SATURATION: 100 % | TEMPERATURE: 97.6 F | HEIGHT: 64 IN | WEIGHT: 250 LBS | DIASTOLIC BLOOD PRESSURE: 78 MMHG | HEART RATE: 74 BPM

## 2021-02-11 DIAGNOSIS — E66.01 MORBID OBESITY WITH BMI OF 40.0-44.9, ADULT (HCC): Primary | ICD-10-CM

## 2021-02-11 LAB
ANION GAP SERPL CALCULATED.3IONS-SCNC: 7 MMOL/L (ref 4–16)
BUN BLDV-MCNC: 21 MG/DL (ref 6–23)
CALCIUM SERPL-MCNC: 9.4 MG/DL (ref 8.3–10.6)
CHLORIDE BLD-SCNC: 101 MMOL/L (ref 99–110)
CO2: 28 MMOL/L (ref 21–32)
CREAT SERPL-MCNC: 0.9 MG/DL (ref 0.6–1.1)
GFR AFRICAN AMERICAN: >60 ML/MIN/1.73M2
GFR NON-AFRICAN AMERICAN: >60 ML/MIN/1.73M2
GLUCOSE BLD-MCNC: 97 MG/DL (ref 70–99)
POTASSIUM SERPL-SCNC: 4.5 MMOL/L (ref 3.5–5.1)
SODIUM BLD-SCNC: 136 MMOL/L (ref 135–145)

## 2021-02-11 PROCEDURE — 99999 PR OFFICE/OUTPT VISIT,PROCEDURE ONLY: CPT

## 2021-02-11 PROCEDURE — 74176 CT ABD & PELVIS W/O CONTRAST: CPT

## 2021-02-11 RX ORDER — KETOROLAC TROMETHAMINE 10 MG/1
10 TABLET, FILM COATED ORAL EVERY 8 HOURS
Qty: 9 TABLET | Refills: 0 | Status: SHIPPED | OUTPATIENT
Start: 2021-02-11 | End: 2021-02-24

## 2021-02-11 ASSESSMENT — ENCOUNTER SYMPTOMS
DIARRHEA: 0
NAUSEA: 0
BELCHING: 0
FLATUS: 0
ABDOMINAL PAIN: 1
EYES NEGATIVE: 1
RESPIRATORY NEGATIVE: 1
CONSTIPATION: 0
VOMITING: 0

## 2021-02-11 NOTE — PROGRESS NOTES
Outpatient Nutrition Counseling    REASON FOR VISIT: Initial Nutrition Visit    Chief Complaint:    Chief Complaint   Patient presents with    Weight Management       SUBJECTIVE:  Pt was called to instruct on Initial Nutrition Education. Pt was emailed all handouts including pt handbook. Instructed on mindful eating, label reading, calorie counting, healthy food choices and goal setting. Pt voiced understanding of all info provided. The patient is a 35 y.o. female being seen for morbid obesity, considering weight loss surgery; Damaris's,  ,  , Current There is no height or weight on file to calculate BMI. The patient's PCP is PAULO Burton CNP   The patient first recognized that she had a weight problem about 10 years ago. The patient's lowest weight in the last five years was 190 lbs, and the highest weight in the last five years was 250 lbs. Weight Loss Program History:  The patient states she has tried Adipex. The most weight lost ever with diet and exercise was 50 Lbs, but unfortunately only kept them of for a short time. Damaris's life is significantly affected by weight related to her co-morbidities. Comorbid Conditions:  Significant diseases affecting this patient are   Past Medical History:   Diagnosis Date    Anxiety     CCC (chronic calculous cholecystitis) 2016    Depression     OCD (obsessive compulsive disorder)     Polycystic ovarian syndrome    . Review of Systems - Review of Systems  Otherwise per HPI.     Allergies:  No Known Allergies    Past Surgical History:  Past Surgical History:   Procedure Laterality Date     SECTION  2010, 2016, 11/2016    x3    CHOLECYSTECTOMY, LAPAROSCOPIC  2016    OVARIAN CYST REMOVAL  2014    TONSILLECTOMY  age 10    T & A        Family History:  Family History   Problem Relation Age of Onset    High Blood Pressure Mother     No Known Problems Father     No Known Problems Maternal Grandmother Individualized treatment goals to address nutritiondiagnosis:   Instructed on [id number] kcal diet for weight loss   Provided pt handbook, food lists, and goal card   Encouraged Physical activity as approved by physician    MONITORING/ EVALUATION/ PLAN:   Pt verbalized understanding of allmaterials covered   Pt asked pertinent questions throughout the session - expect compliance with nutrition guidelines presented   Provided pt with contact information should questions arise prior to next visit   Will f/u with pt in 2-3 months for further education   Keith Strickland MS, RDN, LD  2/11/2021

## 2021-02-11 NOTE — ED TRIAGE NOTES
Started with Lt lower abd pain about an hour ago. Has had tubal ligation so is not pregnant. Has had a cyst before and thinks it feels like that.

## 2021-02-11 NOTE — ED PROVIDER NOTES
The history is provided by the patient. Abdominal Pain  Pain location:  Suprapubic, RLQ and LLQ  Pain quality: throbbing    Pain quality comment:  Pain started on left and then migrated to right  Pain severity:  Moderate  Onset quality:  Sudden  Progression:  Waxing and waning  Chronicity:  New  Relieved by:  Nothing  Worsened by:  Position changes  Ineffective treatments:  None tried  Associated symptoms: no anorexia, no belching, no chills, no constipation, no diarrhea, no dysuria, no fever, no flatus, no hematuria, no nausea, no vaginal bleeding, no vaginal discharge and no vomiting        Review of Systems   Constitutional: Negative. Negative for chills and fever. HENT: Negative. Eyes: Negative. Respiratory: Negative. Cardiovascular: Negative. Gastrointestinal: Positive for abdominal pain. Negative for anorexia, constipation, diarrhea, flatus, nausea and vomiting. Genitourinary: Negative. Negative for dysuria, hematuria, vaginal bleeding and vaginal discharge. Musculoskeletal: Negative. Skin: Negative. Neurological: Negative. All other systems reviewed and are negative.       Family History   Problem Relation Age of Onset    High Blood Pressure Mother     No Known Problems Father     No Known Problems Maternal Grandmother     No Known Problems Maternal Grandfather     Cancer Paternal Grandmother     No Known Problems Paternal Grandfather      Social History     Socioeconomic History    Marital status:      Spouse name: Not on file    Number of children: Not on file    Years of education: Not on file    Highest education level: Not on file   Occupational History    Not on file   Social Needs    Financial resource strain: Not on file    Food insecurity     Worry: Not on file     Inability: Not on file    Transportation needs     Medical: Not on file     Non-medical: Not on file   Tobacco Use    Smoking status: Former Smoker     Years: 9.00     Quit date: 2020     Years since quittin.1    Smokeless tobacco: Never Used   Substance and Sexual Activity    Alcohol use: Never     Frequency: Never    Drug use: No    Sexual activity: Yes     Partners: Male   Lifestyle    Physical activity     Days per week: Not on file     Minutes per session: Not on file    Stress: Not on file   Relationships    Social connections     Talks on phone: Not on file     Gets together: Not on file     Attends Islam service: Not on file     Active member of club or organization: Not on file     Attends meetings of clubs or organizations: Not on file     Relationship status: Not on file    Intimate partner violence     Fear of current or ex partner: Not on file     Emotionally abused: Not on file     Physically abused: Not on file     Forced sexual activity: Not on file   Other Topics Concern    Not on file   Social History Narrative    Not on file     Past Surgical History:   Procedure Laterality Date     SECTION  2010, 2016, 11/2016    x3    CHOLECYSTECTOMY, LAPAROSCOPIC  2016    OVARIAN CYST REMOVAL  2014    TONSILLECTOMY  age 10    T & A      Past Medical History:   Diagnosis Date    Anxiety     CCC (chronic calculous cholecystitis) 2016    Depression     OCD (obsessive compulsive disorder)     Polycystic ovarian syndrome      No Known Allergies  Prior to Admission medications    Medication Sig Start Date End Date Taking? Authorizing Provider   ketorolac (TORADOL) 10 MG tablet Take 1 tablet by mouth every 8 hours for 3 days 21 Yes Jenniffer Akbar Ray, DO       BP (!) 140/85   Pulse 81   Temp 97.6 °F (36.4 °C) (Oral)   Resp 16   Ht 5' 4\" (1.626 m)   Wt 250 lb (113.4 kg)   LMP 2021 (Exact Date)   SpO2 100%   BMI 42.91 kg/m²     Physical Exam  Vitals signs and nursing note reviewed. Constitutional:       General: She is not in acute distress. Appearance: She is well-developed. She is not toxic-appearing. HENT:      Head: Normocephalic and atraumatic. Right Ear: External ear normal.      Left Ear: External ear normal.      Nose: Nose normal.   Eyes:      Conjunctiva/sclera: Conjunctivae normal.      Pupils: Pupils are equal, round, and reactive to light. Neck:      Musculoskeletal: Normal range of motion and neck supple. Cardiovascular:      Rate and Rhythm: Normal rate and regular rhythm. Heart sounds: Normal heart sounds. Pulmonary:      Effort: Pulmonary effort is normal.      Breath sounds: Normal breath sounds. Abdominal:      General: Bowel sounds are normal.      Palpations: Abdomen is soft. Tenderness: There is abdominal tenderness in the right lower quadrant and suprapubic area. Genitourinary:     Adnexa:         Right: No tenderness or fullness. Left: No tenderness or fullness. Musculoskeletal: Normal range of motion. Skin:     General: Skin is warm and dry. Capillary Refill: Capillary refill takes less than 2 seconds. Neurological:      General: No focal deficit present. Mental Status: She is alert and oriented to person, place, and time. GCS: GCS eye subscore is 4. GCS verbal subscore is 5. GCS motor subscore is 6. Psychiatric:         Behavior: Behavior normal.         Thought Content: Thought content normal.         Judgment: Judgment normal.         MDM:    Labs Reviewed   URINALYSIS - Abnormal; Notable for the following components:       Result Value    Bacteria, UA OCCASIONAL (*)     All other components within normal limits   CBC WITH AUTO DIFFERENTIAL - Abnormal; Notable for the following components:    WBC 13.6 (*)     Monocytes % 8.3 (*)     All other components within normal limits   BASIC METABOLIC PANEL   HCG, SERUM, QUALITATIVE       CT ABDOMEN PELVIS WO CONTRAST   Final Result   Status post cholecystectomy with no acute abnormality seen.       No hydronephrosis or renal stones and no CT evidence of urinary obstruction      No pelvic mass or active inflammation. 4 mm pleural-based nodule left lung base and a calcified granuloma   posteriorly on the left which is unchanged since 2008. Patient was given IV Toradol in the emergency department and she had resolution of her discomfort. The patient states that she is currently not having any pain and repeat abdominal examination reveals no tenderness guarding or peritoneal signs. Patient CT scan does not show any evidence of acute pathology there is no hydronephrosis or renal stones no evidence of any type of obstruction. There is no pelvic mass or active inflammation. It is possible that the patient has a small ovarian cyst which is not visible on CT scan and would require ultrasound. I do not believe this represents an ovarian torsion. The patient was advised that I believe that she will need an outpatient ultrasound and we discussed at length signs and symptoms concerning for ovarian torsion as well as return criteria for the emergency department. I added toradol after patient AVS had been printed at her request.   My typical dicussion, presentation,and considerations for this patients' chief complaint, diagnosis, and differential diagnosis have been considered. I have stressed need for follow up and reexamination for this encounter with a PCP or designated specialist provider. If the patient has no PCP or specialist provider one on call and additional resources for their availability in the area have been provided. The patient was also told to return to the emergency department if any changes or any concern. If medication(s) were prescribed , the medication(s) use,  medication(s) safety and medication(s) interactions with already prescribed medication(s) have been explained and outlined for this encounter. All Patients questions and concerns from this visit have been addressed prior to discharge  The patient scripts were sent electronically to their pharmacy.     The patient was educated that it is their responsibility to verify this information is correct at the time of discharge and to contact this department of any complications with the pharmacy providing this medication(s) or if their any difficulty in obtaining this medication(s). Final Impression    1.  Abdominal pain, right upper quadrant              287 Randya Kingsley, DO  02/11/21 6224

## 2021-02-11 NOTE — ED NOTES
Dr Hinojosa Printers in to discuss test results and plan to discharge.      Sydnee Weinberg, CLINT  02/11/21 0040

## 2021-02-11 NOTE — ED NOTES
Discharge instructions and prescription reviewed with pt and verbalizes understanding.      Jermaine Wong RN  02/11/21 [address], RN  02/11/21 8316

## 2021-02-18 ENCOUNTER — HOSPITAL ENCOUNTER (OUTPATIENT)
Dept: ULTRASOUND IMAGING | Age: 31
Discharge: HOME | End: 2021-02-18
Payer: COMMERCIAL

## 2021-02-18 DIAGNOSIS — R10.32 LEFT LOWER QUADRANT PAIN: ICD-10-CM

## 2021-02-18 PROCEDURE — 93975 VASCULAR STUDY: CPT

## 2021-02-18 PROCEDURE — 76856 US EXAM PELVIC COMPLETE: CPT

## 2021-02-24 ENCOUNTER — OFFICE VISIT (OUTPATIENT)
Dept: BARIATRICS/WEIGHT MGMT | Age: 31
End: 2021-02-24
Payer: COMMERCIAL

## 2021-02-24 ENCOUNTER — HOSPITAL ENCOUNTER (OUTPATIENT)
Dept: PHYSICAL THERAPY | Age: 31
Setting detail: THERAPIES SERIES
Discharge: HOME | End: 2021-02-24
Payer: COMMERCIAL

## 2021-02-24 VITALS
SYSTOLIC BLOOD PRESSURE: 128 MMHG | BODY MASS INDEX: 41.75 KG/M2 | TEMPERATURE: 97.8 F | HEIGHT: 65 IN | DIASTOLIC BLOOD PRESSURE: 70 MMHG | RESPIRATION RATE: 18 BRPM | WEIGHT: 250.6 LBS | HEART RATE: 88 BPM

## 2021-02-24 DIAGNOSIS — Z01.818 PRE-OP EVALUATION: ICD-10-CM

## 2021-02-24 DIAGNOSIS — E66.01 MORBID OBESITY WITH BMI OF 40.0-44.9, ADULT (HCC): Primary | ICD-10-CM

## 2021-02-24 PROCEDURE — 99214 OFFICE O/P EST MOD 30 MIN: CPT | Performed by: NURSE PRACTITIONER

## 2021-02-24 PROCEDURE — 1036F TOBACCO NON-USER: CPT | Performed by: NURSE PRACTITIONER

## 2021-02-24 PROCEDURE — G8427 DOCREV CUR MEDS BY ELIG CLIN: HCPCS | Performed by: NURSE PRACTITIONER

## 2021-02-24 PROCEDURE — 97150 GROUP THERAPEUTIC PROCEDURES: CPT

## 2021-02-24 PROCEDURE — G8484 FLU IMMUNIZE NO ADMIN: HCPCS | Performed by: NURSE PRACTITIONER

## 2021-02-24 PROCEDURE — G8417 CALC BMI ABV UP PARAM F/U: HCPCS | Performed by: NURSE PRACTITIONER

## 2021-02-24 ASSESSMENT — ENCOUNTER SYMPTOMS
TROUBLE SWALLOWING: 0
WHEEZING: 0
ABDOMINAL DISTENTION: 0
NAUSEA: 0
SHORTNESS OF BREATH: 0
BACK PAIN: 1
RHINORRHEA: 0
DIARRHEA: 0
CHEST TIGHTNESS: 0
EYE PAIN: 0
ABDOMINAL PAIN: 0

## 2021-02-24 NOTE — PROGRESS NOTES
BARIATRIC SURGERYOFFICE NOTE    SUBJECTIVE:    Patient presenting today referred from PAULO Garay CNP, for   Chief Complaint   Patient presents with    Weight Management     2nd WM visit, diet, exercise and pre-surgical weight loss. .    Vitals:    21 1052   BP: 128/70   Pulse: 88   Resp: 18   Temp: 97.8 °F (36.6 °C)        BMI: Body mass index is 42.35 kg/m². Obesity Classification: III Morbid Obesity. Weight History: Wt Readings from Last 3 Encounters:   21 250 lb 9.6 oz (113.7 kg)   02/10/21 250 lb (113.4 kg)   21 250 lb 4.8 oz (113.5 kg)       HPI: Racheal Granados is a 35 y.o. female presenting in second bariatric visit, follow up diet and exercise - pre-operative weight loss, in consideration for bariatric surgery. Patient dines out to a sit down restaurant 0 times per month. Patient eats fast food meals 0 times per month. Drinks mostly water    24 hour recall/food frequency chart:  Breakfast: 2 eggs and toast  Snack: none  Lunch: salad   Snack: yogurt  Dinner: pizza  Snack: yogurt    Total daily calories: 1200      Exercise: not regularly  Hx of tubal ligation. Water intake is good   No pops or juices. Counting calories. Protein at each meal.     Total weight loss/gain +0.3 Lbs over 2 month. Thoroughly reviewed thepatient's medical history, family history, social history and review of systems with the patient today in the office. Please see medical record for pertinent positives.       Past Medical History:   Diagnosis Date    Anxiety     CCC (chronic calculous cholecystitis) 2016    Depression     OCD (obsessive compulsive disorder)     Polycystic ovarian syndrome       Patient Active Problem List   Diagnosis    CCC (chronic calculous cholecystitis)    Pilar cyst    Tobacco abuse    Morbid obesity with BMI of 40.0-44.9, adult [facility])     Past Surgical History:   Procedure Laterality Date     SECTION  2010, 2016, 11/2016    x3  CHOLECYSTECTOMY, LAPAROSCOPIC  05/09/2016    OVARIAN CYST REMOVAL  05/2014    TONSILLECTOMY  age 10    T & A     TUBAL LIGATION          No current outpatient medications on file. No current facility-administered medications for this visit. No Known Allergies        Review of Systems   Constitutional: Negative. Negative for appetite change, fatigue and fever. HENT: Negative for congestion, dental problem, hearing loss, rhinorrhea and trouble swallowing. Eyes: Negative for pain. Respiratory: Negative for chest tightness, shortness of breath and wheezing. Cardiovascular: Negative for chest pain, palpitations and leg swelling. Gastrointestinal: Negative for abdominal distention, abdominal pain, diarrhea and nausea. Endocrine: Negative for cold intolerance and polydipsia. Genitourinary: Negative for difficulty urinating and frequency. Musculoskeletal: Positive for arthralgias and back pain. Negative for gait problem. Skin: Negative for rash. Allergic/Immunologic: Negative for environmental allergies. Neurological: Negative for dizziness, seizures and syncope. Hematological: Does not bruise/bleed easily. Psychiatric/Behavioral: Negative for behavioral problems and suicidal ideas. OBJECTIVE:    /70   Pulse 88   Temp 97.8 °F (36.6 °C) (Infrared)   Resp 18   Ht 5' 4.5\" (1.638 m)   Wt 250 lb 9.6 oz (113.7 kg)   LMP 02/15/2021   BMI 42.35 kg/m²       Physical Exam  Vitals signs and nursing note reviewed. Constitutional:       Appearance: She is well-developed. Comments: Obese   HENT:      Head: Normocephalic and atraumatic. Right Ear: Hearing and ear canal normal.      Left Ear: Hearing and ear canal normal.      Nose: Nose normal.      Mouth/Throat:      Pharynx: Uvula midline. Eyes:      Conjunctiva/sclera: Conjunctivae normal.      Pupils: Pupils are equal, round, and reactive to light. Neck:      Musculoskeletal: Normal range of motion. Cardiovascular:      Rate and Rhythm: Normal rate and regular rhythm. Heart sounds: Normal heart sounds. Pulmonary:      Effort: Pulmonary effort is normal.      Breath sounds: Normal breath sounds. No decreased breath sounds, wheezing, rhonchi or rales. Abdominal:      General: Bowel sounds are normal.      Palpations: Abdomen is soft. Tenderness: There is no abdominal tenderness. Musculoskeletal: Normal range of motion. General: No tenderness. Comments: In all 4 extremities. Skin:     General: Skin is warm and dry. Findings: No rash. Neurological:      Mental Status: She is alert and oriented to person, place, and time. GCS: GCS eye subscore is 4. GCS verbal subscore is 5. GCS motor subscore is 6. Motor: No abnormal muscle tone. Psychiatric:         Behavior: Behavior normal.         Judgment: Judgment normal.         ASSESSMENT & PLAN:    1. Pre-op evaluation  - Ambulatory referral to Cardiology  - Ambulatory referral to Pulmonology    2. Morbid obesity with BMI of 40.0-44.9, [facility]  - Patients first visit with NP, risk stratification.   - Stopped smoking.   - Discussed below. - Continue diet and clearances. Patientwas encouraged to journal all food intake. Keep calorie level at approximately 0511-0963. Protein intake is to be a minimum of 40-50 grams per day. Water drinking was encouraged with a goal of 64oz-128oz daily. Beverages socorro calorie free except for milk and avoid soda. Continue to increase level of physical activity. I spent 25 minutes with the patient face to face today and over 50% of the office visit today was spent in face to face counseling regarding diet and exercise, in preparation for her planned Robotic Sleeve Gastrectomy. Discussed in length complying with the dietary recommendations, complying with the preoperative workup including dietary counseling completed, exercise physiologist counseling completed, andpre-operative optimization of pulmonologist in process and cardiologist in process. The patient expressed understanding and willingness to comply nicely; all questions and concerns addressed. No orders of the defined types were placed in this encounter. Orders Placed This Encounter   Procedures    Ambulatory referral to Cardiology     Referral Priority:   Routine     Referral Type:   Consult for Advice and Opinion     Referred to Provider:   Aureliano Mena MD     Number of Visits Requested:   1    Ambulatory referral to Pulmonology     Referral Priority:   Routine     Referral Type:   Consult for Advice and Opinion     Referral Reason:   Specialty Services Required     Referred to Provider:   Mavis Jackson MD     Number of Visits Requested:   1       Follow Up:  Return in about 1 month (around 3/24/2021) for Weight Check.     Davide Cline, CNP

## 2021-02-24 NOTE — DISCHARGE SUMMARY
Outpatient Physical Therapy                                                                    [place]           [x] Phone: [phone]   Fax: [phone]  [place]           [] Phone: [phone]   Fax: [phone]          Physical Therapy Daily Treatment Note  Date:  2021    Patient Name:  Pierre Almeida    :  1990  MRN: [id number]  Restrictions/Precautions:    Pain level: 0/10     Pierre Almeida was seen today for the physical activity education presentation. Patient was screened for Covid symptoms and their temperature was taken. Patient does not have symptoms warranting delaying services. Will proceed with today's session. The purpose of the group exercises presentation was to present information to the individual for fitness and wellness. Part one of the presentation we spoke about the reasons why exercises and wellness is beneficial. We presented data and examples of different symptoms in the body improve as well improvement with sleep, anxiety, depression weight loss and improve in lung function. Part 2- Setting goals was described. SMART goals and formation of short term and long term goals to help be successful. Patient was referred back to their packet for example worksheet. Part 3- Examples of how to get started. Planning your exercises routine to be successful. Remember to be start slow and gradually increase in the intensity at home. Drinking plenty of fluid and always perform a warm up or cool down. Part 4- Exercises Basics - demonstrated the importance of a 5-10 min warm up with light stretches. Cool down to allow the body to return to base line measure. They demonstrated how to take their heart rate and why it is important to know the target range zone for fitness. Explained the Do's and Don'ts and the PRICE principal if injured. Explained they are to seek medical advice if severe pain, numbness, swelling or radicular symptoms occur.     Patient was guided

## 2021-03-15 ENCOUNTER — INITIAL CONSULT (OUTPATIENT)
Dept: PULMONOLOGY | Age: 31
End: 2021-03-15
Payer: COMMERCIAL

## 2021-03-15 VITALS
HEART RATE: 84 BPM | OXYGEN SATURATION: 99 % | HEIGHT: 64 IN | WEIGHT: 253 LBS | SYSTOLIC BLOOD PRESSURE: 114 MMHG | TEMPERATURE: 96.6 F | BODY MASS INDEX: 43.19 KG/M2 | DIASTOLIC BLOOD PRESSURE: 72 MMHG

## 2021-03-15 DIAGNOSIS — R53.83 FATIGUE, UNSPECIFIED TYPE: ICD-10-CM

## 2021-03-15 DIAGNOSIS — Z01.818 PREOP TESTING: ICD-10-CM

## 2021-03-15 DIAGNOSIS — Z00.00 HEALTHCARE MAINTENANCE: ICD-10-CM

## 2021-03-15 DIAGNOSIS — Z01.811 PRE-OPERATIVE RESPIRATORY EXAMINATION: Primary | ICD-10-CM

## 2021-03-15 DIAGNOSIS — E66.01 MORBID OBESITY (HCC): ICD-10-CM

## 2021-03-15 PROCEDURE — G8428 CUR MEDS NOT DOCUMENT: HCPCS | Performed by: NURSE PRACTITIONER

## 2021-03-15 PROCEDURE — 99244 OFF/OP CNSLTJ NEW/EST MOD 40: CPT | Performed by: NURSE PRACTITIONER

## 2021-03-15 PROCEDURE — G8484 FLU IMMUNIZE NO ADMIN: HCPCS | Performed by: NURSE PRACTITIONER

## 2021-03-15 PROCEDURE — G8417 CALC BMI ABV UP PARAM F/U: HCPCS | Performed by: NURSE PRACTITIONER

## 2021-03-15 ASSESSMENT — SLEEP AND FATIGUE QUESTIONNAIRES
HOW LIKELY ARE YOU TO NOD OFF OR FALL ASLEEP WHILE LYING DOWN TO REST IN THE AFTERNOON WHEN CIRCUMSTANCES PERMIT: 2
HOW LIKELY ARE YOU TO NOD OFF OR FALL ASLEEP WHILE SITTING AND TALKING TO SOMEONE: 0
HOW LIKELY ARE YOU TO NOD OFF OR FALL ASLEEP WHILE SITTING AND READING: 0
HOW LIKELY ARE YOU TO NOD OFF OR FALL ASLEEP WHILE SITTING QUIETLY AFTER LUNCH WITHOUT ALCOHOL: 0
HOW LIKELY ARE YOU TO NOD OFF OR FALL ASLEEP WHEN YOU ARE A PASSENGER IN A CAR FOR AN HOUR WITHOUT A BREAK: 0

## 2021-03-15 NOTE — PROGRESS NOTES
Subjective:   CHIEF COMPLAINT / HPI:       Faith Thompson is a 35 y.o. female with history of CCC, pilar cyst, morbid obesity, presents to pulmonary clinic for preoperative clearance from respiratory for bariatric weight loss surgery. She states that she has been contemplating weight loss surgery for several months. She is 3 months into her weight loss program at [facility] weight loss management. She states she is planning on having a gastric sleeve weight loss surgery. She denies any history of respiratory related illness as a child or as an adult. She denies any known allergies food, seasonal, medications. She states overall she feels that she is improving her health as she is eating better, exercising daily however her weight has not changed. She states her weight in the last 3 months has stayed around 250 pounds. She states she is frustrated by this but knows in the long run she will be healthier. She is a smoker in the past but quit 3 months ago as she was attempting to get healthy. She states it has been hard and at times she wants a cigarette but she knows it is best that she not. She states she is committed to smoking cessation. Tiffanieaditya Harden states they are practicing social distancing, wearing a mask when out in public, washing hands frequently or using hand . Denies any fever, chills, malaise, change in sensation of taste or smell, headache or lightheadedness. Denies any known contacts with persons with respiratory infection, positive for coronavirus or under investigation for possible coronavirus exposure. Influenza immunization: Fall 2020   Pneumococcal immunization: Pneumovax 23 4/3/2019  COVID-19 immunization: Has not received  Smoking history: Former smoker quit 3 months ago, started smoking when she was 16, states that the most she ever smoked was a pack of cigarettes a day  PCP: Ene Ashley CNP;    Weight loss management: Daisy Huff CNP    Past Medical History:  Past tenderness, no clubbing of digits  NEURO: Oriented X 3, No focal deficits  SKIN: warm and dry    LAB:CBC with Differential:    Lab Results   Component Value Date    WBC 13.6 02/10/2021    RBC 4.29 02/10/2021    HGB 13.0 02/10/2021    HCT 38.5 02/10/2021     02/10/2021    MCV 89.7 02/10/2021    MCH 30.3 02/10/2021    MCHC 33.8 02/10/2021    RDW 12.7 02/10/2021    SEGSPCT 59.9 02/10/2021    LYMPHOPCT 28.7 02/10/2021    MONOPCT 8.3 02/10/2021    BASOPCT 0.4 02/10/2021    MONOSABS 1.1 02/10/2021    LYMPHSABS 3.9 02/10/2021    EOSABS 0.3 02/10/2021    BASOSABS 0.1 02/10/2021    DIFFTYPE AUTOMATED DIFFERENTIAL 02/10/2021     BMP:    Lab Results   Component Value Date     02/10/2021    K 4.5 02/10/2021     02/10/2021    CO2 28 02/10/2021    BUN 21 02/10/2021    CREATININE 0.9 02/10/2021    CALCIUM 9.4 02/10/2021    GFRAA >60 02/10/2021    LABGLOM >60 02/10/2021    GLUCOSE 97 02/10/2021     Hepatic Function Panel:    Lab Results   Component Value Date    ALKPHOS 77 01/28/2021    ALT 28 01/28/2021    AST 26 01/28/2021    PROT 7.1 01/28/2021    BILITOT 0.3 01/28/2021    BILIDIR 0.2 01/28/2021    IBILI 0.1 01/28/2021     ABG:  No results found for: ZTM2GOE, BEART, F8VAKYGQ, PHART, THGBART, DDD1LED, PO2ART, UAT4UZA    RADIOLOGY:  No new radiology studies to review    PFT:   To be obtained    Assessment      1. Pre-operative respiratory examination    2. Fatigue, unspecified type    3. Morbid obesity ([address]    4. Preop testing    5. Healthcare maintenance        Plan:  As part of her risk stratification for bariatric weight loss surgery from a pulmonary stance. We will obtain a pulmonary function test.  She is aware that she will need to complete a prescreening COVID-19 testing prior to pulmonary function test.  She has low risk of sleep apnea other than obesity. We will start with a home overnight apnea link.   She is aware should her AHI be greater than 5 we will need to complete an in lab formal sleep study

## 2021-03-23 DIAGNOSIS — Z01.811 PREOP PULMONARY/RESPIRATORY EXAM: Primary | ICD-10-CM

## 2021-03-24 ENCOUNTER — HOSPITAL ENCOUNTER (OUTPATIENT)
Age: 31
Discharge: HOME | End: 2021-03-24
Payer: COMMERCIAL

## 2021-03-24 ENCOUNTER — OFFICE VISIT (OUTPATIENT)
Dept: BARIATRICS/WEIGHT MGMT | Age: 31
End: 2021-03-24
Payer: COMMERCIAL

## 2021-03-24 ENCOUNTER — INITIAL CONSULT (OUTPATIENT)
Dept: CARDIOLOGY CLINIC | Age: 31
End: 2021-03-24
Payer: COMMERCIAL

## 2021-03-24 VITALS
DIASTOLIC BLOOD PRESSURE: 70 MMHG | HEART RATE: 78 BPM | TEMPERATURE: 98.5 F | OXYGEN SATURATION: 97 % | HEIGHT: 65 IN | SYSTOLIC BLOOD PRESSURE: 120 MMHG | BODY MASS INDEX: 41.13 KG/M2 | WEIGHT: 246.9 LBS

## 2021-03-24 VITALS
DIASTOLIC BLOOD PRESSURE: 72 MMHG | HEIGHT: 64 IN | HEART RATE: 73 BPM | TEMPERATURE: 97.5 F | SYSTOLIC BLOOD PRESSURE: 128 MMHG | WEIGHT: 248 LBS | BODY MASS INDEX: 42.34 KG/M2

## 2021-03-24 DIAGNOSIS — E66.01 MORBID OBESITY WITH BMI OF 40.0-44.9, ADULT (HCC): Primary | ICD-10-CM

## 2021-03-24 DIAGNOSIS — Z01.818 PRE-OP EVALUATION: Primary | ICD-10-CM

## 2021-03-24 PROCEDURE — G8427 DOCREV CUR MEDS BY ELIG CLIN: HCPCS | Performed by: NURSE PRACTITIONER

## 2021-03-24 PROCEDURE — 1036F TOBACCO NON-USER: CPT | Performed by: NURSE PRACTITIONER

## 2021-03-24 PROCEDURE — G8484 FLU IMMUNIZE NO ADMIN: HCPCS | Performed by: NURSE PRACTITIONER

## 2021-03-24 PROCEDURE — G8417 CALC BMI ABV UP PARAM F/U: HCPCS | Performed by: NURSE PRACTITIONER

## 2021-03-24 PROCEDURE — 99213 OFFICE O/P EST LOW 20 MIN: CPT | Performed by: NURSE PRACTITIONER

## 2021-03-24 PROCEDURE — G8417 CALC BMI ABV UP PARAM F/U: HCPCS | Performed by: INTERNAL MEDICINE

## 2021-03-24 PROCEDURE — U0003 INFECTIOUS AGENT DETECTION BY NUCLEIC ACID (DNA OR RNA); SEVERE ACUTE RESPIRATORY SYNDROME CORONAVIRUS 2 (SARS-COV-2) (CORONAVIRUS DISEASE [COVID-19]), AMPLIFIED PROBE TECHNIQUE, MAKING USE OF HIGH THROUGHPUT TECHNOLOGIES AS DESCRIBED BY CMS-2020-01-R: HCPCS

## 2021-03-24 PROCEDURE — G8484 FLU IMMUNIZE NO ADMIN: HCPCS | Performed by: INTERNAL MEDICINE

## 2021-03-24 PROCEDURE — 93000 ELECTROCARDIOGRAM COMPLETE: CPT | Performed by: INTERNAL MEDICINE

## 2021-03-24 PROCEDURE — G8427 DOCREV CUR MEDS BY ELIG CLIN: HCPCS | Performed by: INTERNAL MEDICINE

## 2021-03-24 PROCEDURE — 99243 OFF/OP CNSLTJ NEW/EST LOW 30: CPT | Performed by: INTERNAL MEDICINE

## 2021-03-24 ASSESSMENT — ENCOUNTER SYMPTOMS
RHINORRHEA: 0
EYE PAIN: 0
CHEST TIGHTNESS: 0
TROUBLE SWALLOWING: 0
WHEEZING: 0
NAUSEA: 0
ABDOMINAL DISTENTION: 0
DIARRHEA: 0
ABDOMINAL PAIN: 0
SHORTNESS OF BREATH: 0

## 2021-03-24 NOTE — PROGRESS NOTES
BARIATRIC SURGERYOFFICE NOTE    SUBJECTIVE:    Patient presenting today referred from PAULO Enamorado CNP, for   Chief Complaint   Patient presents with    Follow-up     F/U 3rd wm visit $ 150 balance   . Vitals:    21 1013   BP: 120/70   Pulse: 78   Temp: 98.5 °F (36.9 °C)   SpO2: 97%        BMI: Body mass index is 41.73 kg/m². Obesity Classification: III Morbid Obesity. Weight History: Wt Readings from Last 3 Encounters:   21 246 lb 14.4 oz (112 kg)   03/15/21 253 lb (114.8 kg)   21 250 lb 9.6 oz (113.7 kg)       HPI: Lubertha Landau is a 35 y.o. female presenting in third bariatric visit, follow up diet and exercise - pre-operative weight loss, in consideration for bariatric surgery. Patient dines out to a sit down restaurant 0 times per week. Patient eats fast food meals 0 times per week. Drinks mostly water    24 hour recall/food frequency chart:  Breakfast: protein shake  Snack: applesauce  Lunch: whole wheat ham wrap w/ff cottage cheese  Snack: yogurt  Dinner: taco salad  Snack: none    Total daily calories: 1200      Exercises yes, daily walking. Patient following diet well. Cardiac and pulmonary in progress. Completed PT. Total weight loss/gain -3.4 Lbs over 3 month. Thoroughly reviewed thepatient's medical history, family history, social history and review of systems with the patient today in the office. Please see medical record for pertinent positives.       Past Medical History:   Diagnosis Date    Anxiety     CCC (chronic calculous cholecystitis) 2016    Depression     OCD (obsessive compulsive disorder)     Polycystic ovarian syndrome       Patient Active Problem List   Diagnosis    CCC (chronic calculous cholecystitis)    Pilar cyst    Tobacco abuse    Morbid obesity with BMI of 40.0-44.9, adult [facility])     Past Surgical History:   Procedure Laterality Date     SECTION  2010, 2016, 11/2016    x3    CHOLECYSTECTOMY, LAPAROSCOPIC  05/09/2016    OVARIAN CYST REMOVAL  05/2014    TONSILLECTOMY  age 10    T & A     TUBAL LIGATION          No current outpatient medications on file. No current facility-administered medications for this visit. No Known Allergies      Review of Systems   Constitutional: Negative. Negative for appetite change, fatigue and fever. HENT: Negative for congestion, dental problem, hearing loss, rhinorrhea and trouble swallowing. Eyes: Negative for pain. Respiratory: Negative for chest tightness, shortness of breath and wheezing. Cardiovascular: Negative for chest pain, palpitations and leg swelling. Gastrointestinal: Negative for abdominal distention, abdominal pain, diarrhea and nausea. Endocrine: Negative for cold intolerance and polydipsia. Genitourinary: Negative for difficulty urinating and frequency. Musculoskeletal: Negative for arthralgias and gait problem. Skin: Negative for rash. Allergic/Immunologic: Negative for environmental allergies. Neurological: Negative for dizziness, seizures and syncope. Hematological: Does not bruise/bleed easily. Psychiatric/Behavioral: Negative for behavioral problems and suicidal ideas. OBJECTIVE:    /70   Pulse 78   Temp 98.5 °F (36.9 °C)   Ht 5' 4.5\" (1.638 m)   Wt 246 lb 14.4 oz (112 kg)   LMP 02/01/2021   SpO2 97%   BMI 41.73 kg/m²       Physical Exam  Vitals signs and nursing note reviewed. Constitutional:       Appearance: She is well-developed. Comments: Obese   HENT:      Head: Normocephalic and atraumatic. Right Ear: Hearing and ear canal normal.      Left Ear: Hearing and ear canal normal.      Nose: Nose normal.      Mouth/Throat:      Pharynx: Uvula midline. Eyes:      Conjunctiva/sclera: Conjunctivae normal.      Pupils: Pupils are equal, round, and reactive to light. Neck:      Musculoskeletal: Normal range of motion.    Cardiovascular:      Rate and Rhythm: Normal rate and regular rhythm. Heart sounds: Normal heart sounds. Pulmonary:      Effort: Pulmonary effort is normal.      Breath sounds: Normal breath sounds. No decreased breath sounds, wheezing, rhonchi or rales. Abdominal:      General: Bowel sounds are normal.      Palpations: Abdomen is soft. Tenderness: There is no abdominal tenderness. Musculoskeletal: Normal range of motion. General: No tenderness. Comments: In all 4 extremities. Skin:     General: Skin is warm and dry. Findings: No rash. Neurological:      Mental Status: She is alert and oriented to person, place, and time. GCS: GCS eye subscore is 4. GCS verbal subscore is 5. GCS motor subscore is 6. Motor: No abnormal muscle tone. Psychiatric:         Behavior: Behavior normal.         Judgment: Judgment normal.         ASSESSMENT & PLAN:    1. Morbid obesity with BMI of 40.0-44.9, adult ([facility] [address]  - Doing better, down over -3 lbs since her last visit. - Cardiac and pulmonary in progress. - Needs psych.   - RTC 1 month with surgeon, egd/consent       Discussed in length complying with the dietary recommendations, complying with the preoperative workup including dietary counseling completed, exercise physiologist counseling completed, andpre-operative optimization of pulmonologist in progress and cardiologist in progress. The patient expressed understanding and willingness to comply nicely; all questions and concerns addressed. No orders of the defined types were placed in this encounter. No orders of the defined types were placed in this encounter. Follow Up:  Return in about 1 month (around 4/24/2021) for Weight Check.     Terri Ash CNP

## 2021-03-24 NOTE — PROGRESS NOTES
CARDIOLOGY CONSULT NOTE   Reason for consultation:  Cardiac clearance for gastric sleeve    Referring physician: Zion Blanco CNP  Primary care physician: PAULO Coleman - JOSS      Dear Reji Degrootnt  Thanks for the consult. History of present illness:Damaris is a 35 y. o.year old who  presents with for cardiac clearance for gastric sleeve, she had used adipex in 2017 and lost 50 lbs and than regained 75 lbs,did not use lasix denied any complaints, she used to be depressed and anxious and now feels better. Blood pressure, cholesterol, blood glucose and weight are well controlled. Past medical history:    has a past medical history of Anxiety, CCC (chronic calculous cholecystitis), Depression, OCD (obsessive compulsive disorder), and Polycystic ovarian syndrome. Past surgical history:   has a past surgical history that includes ovarian cyst removal (2014); Tonsillectomy (age 10); Cholecystectomy, laparoscopic (2016);  section (2010, 2016, 2016); and Tubal ligation. Social History:   reports that she quit smoking about 3 months ago. She quit after 9.00 years of use. She has never used smokeless tobacco. She reports that she does not drink alcohol or use drugs. Family history:   no family history of CAD, STROKE of DM    No Known Allergies    No current facility-administered medications for this visit. No current outpatient medications on file. No current facility-administered medications for this visit.       Review of Systems:   · Constitutional: No Fever or Weight Loss   · Eyes: No Decreased Vision  · ENT: No Headaches, Hearing Loss or Vertigo  · Cardiovascular: No chest pain, dyspnea on exertion, palpitations or loss of consciousness  · Respiratory: No cough or wheezing    · Gastrointestinal: No abdominal pain, appetite loss, blood in stools, constipation, diarrhea or heartburn  · Genitourinary: No dysuria, trouble voiding, or hematuria  · Musculoskeletal:  No gait disturbance, weakness or joint complaints  · Integumentary: No rash or pruritis  · Neurological: No TIA or stroke symptoms  · Psychiatric: No anxiety or depression  · Endocrine: No malaise, fatigue or temperature intolerance  · Hematologic/Lymphatic: No bleeding problems, blood clots or swollen lymph nodes  · Allergic/Immunologic: No nasal congestion or hives  All systems negative except as marked. ·   ·      Physical Examination:    Vitals:    03/24/21 1132   BP: 128/72   Pulse: 73   Temp: 97.5 °F (36.4 °C)    RR14    Wt Readings from Last 3 Encounters:   03/24/21 248 lb (112.5 kg)   03/24/21 246 lb 14.4 oz (112 kg)   03/15/21 253 lb (114.8 kg)     Body mass index is 42.57 kg/m². General Appearance:  No distress, conversant    Constitutional:  Well developed, Well nourished, No acute distress, Non-toxic appearance. HENT:  Normocephalic, Atraumatic, Bilateral external ears normal, Oropharynx moist, No oral exudates, Nose normal. Neck- Normal range of motion, No tenderness, Supple, No stridor,no apical-carotid delay, no carotid bruit  Eyes:  PERRL, EOMI, Conjunctiva normal, No discharge. Respiratory:  Normal breath sounds, No respiratory distress, No wheezing, No chest tenderness. ,no use of accessory muscles, diaphragm movement is normal  Cardiovascular: (PMI) apex non displaced,no lifts no thrills, no s3,no s4, Normal heart rate, Normal rhythm, No murmurs, No rubs, No gallops. Carotid arteries pulse and amplitude are normal no bruit, no abdominal bruit noted ( normal abdominal aorta ausculation), femoral arteries pulse and amplitude are normal no bruit, pedal pulses are normal  GI:  Bowel sounds normal, Soft, No tenderness, No masses, No pulsatile masses, no hepatosplenomegally, no bruits  : External genitalia appear normal, No masses or lesions. No discharge. No CVA tenderness. Musculoskeletal:  Intact distal pulses, No edema, No tenderness, No cyanosis, No clubbing. Good range of motion in all major joints.  No tenderness to palpation or major deformities noted. Back- No tenderness. Integument:  Warm, Dry, No erythema, No rash. Skin: no rash, no ulcers  Lymphatic:  No lymphadenopathy noted. Neurologic:  Alert & oriented x 3, Normal motor function, Normal sensory function, No focal deficits noted. Psychiatric:  Affect normal, Judgment normal, Mood normal.   Lab Review   No results for input(s): WBC, HGB, HCT, PLT in the last 72 hours. No results for input(s): NA, K, CL, CO2, PHOS, BUN, CREATININE in the last 72 hours. Invalid input(s): CA  No results for input(s): AST, ALT, ALB, BILIDIR, BILITOT, ALKPHOS in the last 72 hours. No results for input(s): TROPONINI in the last 72 hours. No results found for: BNP  No results found for: INR, PROTIME      EKG:NSR    Chest Xray:    ECHO:  Labs, echo, meds reviewed  Assessment: 35 y. o.year old with PMH of  has a past medical history of Anxiety, CCC (chronic calculous cholecystitis), Depression, OCD (obsessive compulsive disorder), and Polycystic ovarian syndrome. Recommendations:    1-Preop:echo ordered,Patient does not have any angina, CHF, malignant arrythmia or severe stenotic valve, there is no DM,history of MI, CHF, CKD and CVA. Patient is on moderate risk for cardiac complications for low risk surgery, please proceed as planned. 2- obesity: agree with weight loss sx  3-Anxiety and depression : resolved  4- poycystic ovarian syndrome: had cyst removal sx in past, stable for now. All labs, medications and tests reviewed, continue all other medications of all above medical condition listed as is.          Carrie Kang MD, 3/24/2021 11:45 AM

## 2021-03-25 LAB
SARS-COV-2: NOT DETECTED
SOURCE: NORMAL

## 2021-03-29 ENCOUNTER — HOSPITAL ENCOUNTER (OUTPATIENT)
Dept: CARDIAC REHAB | Age: 31
Discharge: HOME | End: 2021-03-29
Payer: COMMERCIAL

## 2021-03-29 DIAGNOSIS — Z01.811 PRE-OPERATIVE RESPIRATORY EXAMINATION: ICD-10-CM

## 2021-03-29 PROCEDURE — 94640 AIRWAY INHALATION TREATMENT: CPT

## 2021-03-29 PROCEDURE — 94729 DIFFUSING CAPACITY: CPT

## 2021-03-29 PROCEDURE — 94727 GAS DIL/WSHOT DETER LNG VOL: CPT

## 2021-03-29 PROCEDURE — 94060 EVALUATION OF WHEEZING: CPT

## 2021-04-13 ENCOUNTER — PROCEDURE VISIT (OUTPATIENT)
Dept: CARDIOLOGY CLINIC | Age: 31
End: 2021-04-13
Payer: COMMERCIAL

## 2021-04-13 DIAGNOSIS — Z01.818 PRE-OP EVALUATION: Primary | ICD-10-CM

## 2021-04-13 LAB
LV EF: 58 %
LVEF MODALITY: NORMAL

## 2021-04-13 PROCEDURE — 93306 TTE W/DOPPLER COMPLETE: CPT | Performed by: INTERNAL MEDICINE

## 2021-04-15 ENCOUNTER — TELEPHONE (OUTPATIENT)
Dept: CARDIOLOGY CLINIC | Age: 31
End: 2021-04-15

## 2021-04-24 NOTE — PROGRESS NOTES
Pulmonary Clinic Office Follow up     Lisa Del Castillo is a 35 y.o. female with history of morbid obesity with BMI of 42.23, former smoker presents today to the pulmonary clinic for follow up for preoperative testing risk stratification, for pending bariatric weight loss surgery. PFT completed with demonstrated essentially normal findings. Overnight apnea link has not been completed yet. She is frustrated that testing has not been completed. She denies any shortness of breath, fever, chills, wheezing, respiratory complaints at this time    Damaris states they are practicing social distancing, wearing a mask when out in public, washing hands frequently or using hand . Denies any fever, chills, malaise, change in sensation of taste or smell, headache or lightheadedness. Denies any known contacts with persons with respiratory infection, positive for coronavirus or under investigation for possible coronavirus exposure. Influenza immunization: Fall 2020   Pneumococcal immunization: Pneumovax 23 4/3/2019  COVID-19 immunization:  Has not received yet  Smoking history: Former smoker quit 3 months ago, started smoking when she was 16, states that the most she ever smoked was a pack of cigarettes a day  PCP: Tara Goddard CNP; Weight loss management: Anne Laura CNP    Past Medical History:  Past Medical History:   Diagnosis Date    Anxiety     CCC (chronic calculous cholecystitis) 5/9/2016    Depression     OCD (obsessive compulsive disorder)     Polycystic ovarian syndrome        Current medications and allergies have been reviewed    Social and family history unchanged from initial consult      REVIEW OF SYSTEMS:    CONSTITUTIONAL:  negative for fevers, chills, diaphoresis, activity change, appetite change, night sweats and unexpected weight change.    HEENT:  Negative for hearing loss, sinus pressure, nasal congestion, epistaxis, snoring  RESPIRATORY: Negative for cough, negative for shortness of breath, negative for wheeze  CARDIOVASCULAR:  Negative for chest pain, palpitations, exertional chest pressure/discomfort, edema, syncope  GASTROINTESTINAL: Negative for nausea, vomiting, diarrhea, constipation, blood in stool and abdominal pain  GENITOURINARY:  Negative for frequency, dysuria and hematuria  HEMATOLOGIC/LYMPHATIC:  Negative for easy bruising, bleeding and lymphadenopathy  ALLERGIC/IMMUNOLOGIC:  Negative for recurrent infections, angioedema, anaphylaxis and drug reaction  MUSCULOSKELETAL:  Negative for pain, joint swelling, decreased range of motion and muscle weakness  NEURO: Negative for headache, AMS, decrease sensations  SKIN: Negative for rashes or lesions      Physical Exam:  /86   Pulse 93   Temp 97.3 °F (36.3 °C)   Ht 5' 4\" (1.626 m)   Wt 246 lb (111.6 kg)   SpO2 98%   BMI 42.23 kg/m²    Body mass index is 42.23 kg/m². Constitutional:  She appears well developed and well-nourished. Neck:  Supple, no palpable lymphadenopathy, no JVD  Cardiovascular:  S1, S2 Normal, Regular rhythm, no murmurs or gallops, no pericardial  rubs. Pulmonary: Bilateral breath sounds clear and equal auscultation, good air movement, no use of accessory muscles to support respiratory effort. No forced expiratory wheeze, no rales, no rhonchi, no cough.   O2 sat in room air at rest 98%  Abdomen: No epigastric pain, no distention, + bowel sounds   Extremities: No edema, no calf tenderness, no clubbing of digits  Neurologic:  Awake and alert, no focal deficits  Skin: warm and dry    Radiology:   No new pulmonary radiographic studies to review      PFT:   3/29/2021  %/97% predicted, FEV1 100%/88% predicted, FEF 25-75% 103%/72% predicted , TLC 98% predicted, RV 85% predicted, DLCO 127% predicted  Following administration of bronchodilator there was no significant response to bronchodilator. Overall testing indicates normal spirometry testing    ASSESSMENT/PLAN:  1. Fatigue, unspecified type    2. Morbid obesity with BMI of 40.0-44.9, adult ([address]    3. Preop pulmonary/respiratory exam    4. Healthcare maintenance      Addison Melchor has completed her pulmonary function tests which demonstrated normal findings. She has quit smoking in preparation for her bariatric weight loss surgery proximately 4 months ago. Addison Melchor is upset that she has not received her clearance from pulmonary for her weight loss surgery. At last visit she was orders were placed for covid testing, PFT and overnight apnea screen. When when she went to have her Covid test she was informed there was no order, review of chart notes that an order was placed on March 13. She states she has not received a call from [facility] medical equipment in regards to overnight apnea link, order has been replaced but the initial order for March 13 is noted in the computer. I have spoken with Fanny Altamirano at [facility] equipment she does have a device available tonight as she has had a cancellation, Damaris is to  the apnea link device today between 4 and 5. I have assured Addison Melchor that once the test is complete which I usually receive the day that she turns the test back in I will sign her letter for risks edification, clearance bariatric surgery. We have discussed the need to maintain yearly flu immunization, pneumococcal vaccination. We have discussed Coronavirus precaution including: social distancing when needing to be in public, handwashing practice, wiping items touched in public such as gas pumps, door handles, shopping carts, etc. Self monitoring for infection - fever, chills, cough, SOB. Should they develop symptoms they should call office for further instructions. Return in about 4 weeks (around 5/24/2021) for Follow Up.       This dictation was performed with a verbal recognition program and it was checked for errors. It is possible that there are still dictated errors within this office note. Any errors should be brought immediately to my attention for correction. All efforts were made to ensure that this office note is accurate.

## 2021-04-26 ENCOUNTER — OFFICE VISIT (OUTPATIENT)
Dept: PULMONOLOGY | Age: 31
End: 2021-04-26
Payer: COMMERCIAL

## 2021-04-26 VITALS
HEART RATE: 93 BPM | OXYGEN SATURATION: 98 % | HEIGHT: 64 IN | DIASTOLIC BLOOD PRESSURE: 86 MMHG | SYSTOLIC BLOOD PRESSURE: 124 MMHG | BODY MASS INDEX: 42 KG/M2 | WEIGHT: 246 LBS | TEMPERATURE: 97.3 F

## 2021-04-26 DIAGNOSIS — Z01.811 PREOP PULMONARY/RESPIRATORY EXAM: ICD-10-CM

## 2021-04-26 DIAGNOSIS — Z00.00 HEALTHCARE MAINTENANCE: ICD-10-CM

## 2021-04-26 DIAGNOSIS — R53.83 FATIGUE, UNSPECIFIED TYPE: Primary | ICD-10-CM

## 2021-04-26 DIAGNOSIS — E66.01 MORBID OBESITY WITH BMI OF 40.0-44.9, ADULT (HCC): ICD-10-CM

## 2021-04-26 PROCEDURE — 99213 OFFICE O/P EST LOW 20 MIN: CPT | Performed by: NURSE PRACTITIONER

## 2021-04-26 PROCEDURE — G8417 CALC BMI ABV UP PARAM F/U: HCPCS | Performed by: NURSE PRACTITIONER

## 2021-04-26 PROCEDURE — G8427 DOCREV CUR MEDS BY ELIG CLIN: HCPCS | Performed by: NURSE PRACTITIONER

## 2021-04-26 PROCEDURE — 1036F TOBACCO NON-USER: CPT | Performed by: NURSE PRACTITIONER

## 2021-04-26 NOTE — LETTER
[address] [place] [address]  Phone: [phone]  Fax: [phone]    PAULO Aguirre CNP        April 28, 2021      [facility] weight loss management/bariatric surgery,    Deandre Meyer, date of birth 1990, has been seen in the pulmonary clinic for risk stratification for clearance for upcoming bariatric weight loss. Bobo Marin has completed a pulmonary function test which demonstrates no signs of obstructive or restrictive disease. She has also completed a overnight oximetry apnea link testing which demonstrates no signs of sleep apnea. At this time she has completed required testing for risk stratification from the pulmonary clinic. I feel Bobo Marin will do extremely well with weight loss surgery. She appears very motivated. I feel weight loss surgery will significantly improve her overall health and wellbeing.       Sincerely,        PAULO Aguirre CNP

## 2021-04-28 ENCOUNTER — OFFICE VISIT (OUTPATIENT)
Dept: BARIATRICS/WEIGHT MGMT | Age: 31
End: 2021-04-28
Payer: COMMERCIAL

## 2021-04-28 VITALS
WEIGHT: 245 LBS | BODY MASS INDEX: 40.82 KG/M2 | HEART RATE: 76 BPM | HEIGHT: 65 IN | DIASTOLIC BLOOD PRESSURE: 80 MMHG | OXYGEN SATURATION: 97 % | SYSTOLIC BLOOD PRESSURE: 130 MMHG

## 2021-04-28 DIAGNOSIS — E66.01 MORBID OBESITY WITH BMI OF 40.0-44.9, ADULT (HCC): Primary | ICD-10-CM

## 2021-04-28 DIAGNOSIS — K80.10 CCC (CHRONIC CALCULOUS CHOLECYSTITIS): ICD-10-CM

## 2021-04-28 PROCEDURE — 99213 OFFICE O/P EST LOW 20 MIN: CPT | Performed by: SURGERY

## 2021-04-28 PROCEDURE — G8427 DOCREV CUR MEDS BY ELIG CLIN: HCPCS | Performed by: SURGERY

## 2021-04-28 PROCEDURE — 1036F TOBACCO NON-USER: CPT | Performed by: SURGERY

## 2021-04-28 PROCEDURE — G8417 CALC BMI ABV UP PARAM F/U: HCPCS | Performed by: SURGERY

## 2021-04-28 ASSESSMENT — ENCOUNTER SYMPTOMS
ABDOMINAL DISTENTION: 1
COLOR CHANGE: 0
VOMITING: 0
BLOOD IN STOOL: 0
TROUBLE SWALLOWING: 0
BACK PAIN: 1
VOICE CHANGE: 0
COUGH: 0
NAUSEA: 0
SHORTNESS OF BREATH: 1
PHOTOPHOBIA: 0
SORE THROAT: 0
CONSTIPATION: 0
ABDOMINAL PAIN: 1
DIARRHEA: 0
WHEEZING: 0
ANAL BLEEDING: 0

## 2021-04-28 NOTE — PROGRESS NOTES
BARIATRIC SURGERY OFFICE NOTE    SUBJECTIVE:    Patient presenting today originally referred from PAULO House CNP, for   Chief Complaint   Patient presents with    Weight Management     4wm visit surg program sign egd consent    . HPI: Herbert Alamo is a 35 y.o. female being seen for follow up for bariatric weight loss surgery. Damaris'st month weight gain/loss was -1.9 Lbs, watching what I am eating. .. Needs EGD, and Cardiac and pulm cleared her now the Psych, and the EGD are pending. Thoroughly reviewed the patient's medical history, family history, social history and review of systems with the patient today in theoffice. Please see medical record for pertinent positives. Past Medical History:   Diagnosis Date    Anxiety     CCC (chronic calculous cholecystitis) 2016    Depression     OCD (obsessive compulsive disorder)     Polycystic ovarian syndrome       Past Surgical History:   Procedure Laterality Date     SECTION  2010, 2016, 11/2016    x3    CHOLECYSTECTOMY, LAPAROSCOPIC  2016    OVARIAN CYST REMOVAL  2014    TONSILLECTOMY  age 10    T & A     TUBAL LIGATION       No current outpatient medications on file. No current facility-administered medications for this visit. No Known Allergies        Review of Systems   Constitutional: Positive for fatigue. Negative for activity change, chills, diaphoresis and fever. HENT: Negative for sore throat, trouble swallowing and voice change. Eyes: Negative for photophobia and visual disturbance. Respiratory: Positive for shortness of breath. Negative for cough and wheezing. Cardiovascular: Positive for leg swelling. Negative for chest pain and palpitations. Gastrointestinal: Positive for abdominal distention and abdominal pain. Negative for anal bleeding, blood in stool, constipation, diarrhea, nausea and vomiting. Endocrine: Positive for polyphagia.  Negative for cold intolerance, heat intolerance, polydipsia and polyuria. Genitourinary: Positive for urgency. Negative for dysuria, frequency and hematuria. Musculoskeletal: Positive for arthralgias, back pain and gait problem. Negative for joint swelling, myalgias and neck stiffness. Skin: Negative for color change and rash. Neurological: Negative for seizures, speech difficulty, light-headedness and numbness. Hematological: Negative for adenopathy. Does not bruise/bleed easily. Psychiatric/Behavioral: Positive for sleep disturbance. The patient is nervous/anxious. OBJECTIVE:    Vitals:    04/28/21 1123   BP: 130/80   Pulse: 76   SpO2: 97%     Body mass index is 41.4 kg/m². Physical Exam  Vitals signs reviewed. Constitutional:       General: She is not in acute distress. Appearance: She is well-developed. She is not diaphoretic. HENT:      Head: Normocephalic and atraumatic. Eyes:      General: No scleral icterus. Conjunctiva/sclera: Conjunctivae normal.      Pupils: Pupils are equal, round, and reactive to light. Neck:      Musculoskeletal: Normal range of motion. Thyroid: No thyromegaly. Vascular: No JVD. Trachea: No tracheal deviation. Cardiovascular:      Rate and Rhythm: Normal rate and regular rhythm. Heart sounds: Normal heart sounds. No murmur. No friction rub. No gallop. Pulmonary:      Effort: Pulmonary effort is normal. No respiratory distress. Breath sounds: No stridor. No wheezing or rales. Chest:      Chest wall: No tenderness. Abdominal:      General: Bowel sounds are normal. There is no distension. Palpations: Abdomen is soft. There is no mass. Tenderness: There is no abdominal tenderness. There is no guarding or rebound. Musculoskeletal: Normal range of motion. General: No tenderness. Lymphadenopathy:      Cervical: No cervical adenopathy. Skin:     General: Skin is warm and dry. Coloration: Skin is not pale.       Findings: No Society of Metabolic and Bariatric Surgeons    [phone]    4/28/21

## 2021-04-29 DIAGNOSIS — Z01.818 PRE-OP EVALUATION: Primary | ICD-10-CM

## 2021-05-12 ENCOUNTER — TELEPHONE (OUTPATIENT)
Dept: BARIATRICS/WEIGHT MGMT | Age: 31
End: 2021-05-12

## 2021-05-12 NOTE — TELEPHONE ENCOUNTER
LEFT MESSAGE FOR Damaris Moon REGARDING SURGERY (EGD WITH BX) SCHEDULED @ [facility].  NOTIFIED OF DATES, TIMES AND LOCATION    PHONE ASSESSMENT /  Sofia - 5/25/21 @ 1111  SURGERY - 6/1/21 @ 1100  P/O -    NPO AFTER MIDNIGHT  HOLD BLOOD THINNERS - 5 DAYS PRIOR IF TAKING ANY

## 2021-05-19 ENCOUNTER — OFFICE VISIT (OUTPATIENT)
Dept: FAMILY MEDICINE CLINIC | Age: 31
End: 2021-05-19
Payer: COMMERCIAL

## 2021-05-19 ENCOUNTER — OFFICE VISIT (OUTPATIENT)
Dept: BARIATRICS/WEIGHT MGMT | Age: 31
End: 2021-05-19
Payer: COMMERCIAL

## 2021-05-19 VITALS
TEMPERATURE: 97 F | BODY MASS INDEX: 41.1 KG/M2 | WEIGHT: 243.2 LBS | RESPIRATION RATE: 16 BRPM | DIASTOLIC BLOOD PRESSURE: 70 MMHG | SYSTOLIC BLOOD PRESSURE: 110 MMHG | OXYGEN SATURATION: 98 % | HEART RATE: 92 BPM

## 2021-05-19 VITALS
HEIGHT: 65 IN | OXYGEN SATURATION: 100 % | WEIGHT: 242.6 LBS | SYSTOLIC BLOOD PRESSURE: 138 MMHG | BODY MASS INDEX: 40.42 KG/M2 | DIASTOLIC BLOOD PRESSURE: 88 MMHG | HEART RATE: 89 BPM

## 2021-05-19 DIAGNOSIS — E66.01 MORBID OBESITY WITH BMI OF 40.0-44.9, ADULT (HCC): Primary | ICD-10-CM

## 2021-05-19 DIAGNOSIS — R20.0 NUMBNESS AND TINGLING IN RIGHT HAND: Primary | ICD-10-CM

## 2021-05-19 DIAGNOSIS — R20.2 NUMBNESS AND TINGLING IN RIGHT HAND: Primary | ICD-10-CM

## 2021-05-19 DIAGNOSIS — E66.01 MORBID OBESITY WITH BMI OF 40.0-44.9, ADULT (HCC): ICD-10-CM

## 2021-05-19 DIAGNOSIS — G56.01 CARPAL TUNNEL SYNDROME OF RIGHT WRIST: ICD-10-CM

## 2021-05-19 PROBLEM — Z87.891 FORMER SMOKER: Status: ACTIVE | Noted: 2019-04-03

## 2021-05-19 PROCEDURE — 99213 OFFICE O/P EST LOW 20 MIN: CPT | Performed by: NURSE PRACTITIONER

## 2021-05-19 PROCEDURE — 1036F TOBACCO NON-USER: CPT | Performed by: NURSE PRACTITIONER

## 2021-05-19 PROCEDURE — 99213 OFFICE O/P EST LOW 20 MIN: CPT | Performed by: SURGERY

## 2021-05-19 PROCEDURE — G8417 CALC BMI ABV UP PARAM F/U: HCPCS | Performed by: SURGERY

## 2021-05-19 PROCEDURE — G8417 CALC BMI ABV UP PARAM F/U: HCPCS | Performed by: NURSE PRACTITIONER

## 2021-05-19 PROCEDURE — G8427 DOCREV CUR MEDS BY ELIG CLIN: HCPCS | Performed by: NURSE PRACTITIONER

## 2021-05-19 PROCEDURE — G8428 CUR MEDS NOT DOCUMENT: HCPCS | Performed by: SURGERY

## 2021-05-19 PROCEDURE — 1036F TOBACCO NON-USER: CPT | Performed by: SURGERY

## 2021-05-19 ASSESSMENT — ENCOUNTER SYMPTOMS
TROUBLE SWALLOWING: 0
CONSTIPATION: 0
WHEEZING: 0
NAUSEA: 0
COLOR CHANGE: 0
SORE THROAT: 0
VOICE CHANGE: 0
VOMITING: 0
ABDOMINAL PAIN: 0
NAUSEA: 0
WHEEZING: 0
COUGH: 0
ANAL BLEEDING: 0
VOMITING: 0
PHOTOPHOBIA: 0
CONSTIPATION: 0
DIARRHEA: 0
SHORTNESS OF BREATH: 0
COUGH: 0
DIARRHEA: 0
ABDOMINAL PAIN: 0
CHEST TIGHTNESS: 0
SHORTNESS OF BREATH: 0
BLOOD IN STOOL: 0

## 2021-05-19 ASSESSMENT — PATIENT HEALTH QUESTIONNAIRE - PHQ9
1. LITTLE INTEREST OR PLEASURE IN DOING THINGS: 0
SUM OF ALL RESPONSES TO PHQ QUESTIONS 1-9: 0

## 2021-05-19 NOTE — PROGRESS NOTES
neck stiffness. Skin: Negative for color change and rash. Neurological: Negative for seizures, speech difficulty, light-headedness and numbness. Hematological: Negative for adenopathy. Does not bruise/bleed easily. OBJECTIVE:    Vitals:    05/19/21 1150   BP: 138/88   Pulse: 89   SpO2: 100%     Body mass index is 41 kg/m². Physical Exam  Vitals reviewed. Constitutional:       General: She is not in acute distress. Appearance: She is well-developed. She is not diaphoretic. HENT:      Head: Normocephalic and atraumatic. Eyes:      General: No scleral icterus. Conjunctiva/sclera: Conjunctivae normal.      Pupils: Pupils are equal, round, and reactive to light. Neck:      Thyroid: No thyromegaly. Vascular: No JVD. Trachea: No tracheal deviation. Cardiovascular:      Rate and Rhythm: Normal rate and regular rhythm. Heart sounds: Normal heart sounds. No murmur heard. No friction rub. No gallop. Pulmonary:      Effort: Pulmonary effort is normal. No respiratory distress. Breath sounds: No stridor. No wheezing or rales. Chest:      Chest wall: No tenderness. Abdominal:      General: Bowel sounds are normal. There is no distension. Palpations: Abdomen is soft. There is no mass. Tenderness: There is no abdominal tenderness. There is no guarding or rebound. Musculoskeletal:         General: No tenderness. Normal range of motion. Cervical back: Normal range of motion. Lymphadenopathy:      Cervical: No cervical adenopathy. Skin:     General: Skin is warm and dry. Coloration: Skin is not pale. Findings: No erythema or rash. Neurological:      Mental Status: She is alert and oriented to person, place, and time. Cranial Nerves: No cranial nerve deficit. Coordination: Coordination normal.   Psychiatric:         Behavior: Behavior normal.         Thought Content:  Thought content normal.         Judgment: Judgment normal. ASSESSMENT & PLAN:    1. Morbid obesity with BMI of 40.0-44.9, adult [facility])         Doing very well, and ready for surgery. Excercises, all day. . and limits 1200Kcal/d. Patient was encouraged to journal all food intake. Keep calorie level atapproximately 8753-2968. Protein intake is to be a minimum of 60-80 grams per day. Water drinking was encouraged with a goal of 64oz-128oz daily. Beverages to be calorie free except for milk. Every other beverage should bewater. They are to avoid soda. Continue to increase level of physical activity. I counseled the patient regarding diet and exercise, in preparation for her planned Robotic Sleeve Gastrectomy. Counting calories, complying with the dietitian's recommendations, and complying with the preoperative workup including the dietitian counseling, exercise physiologist counseling,cardiologist evaluation and pre-operative optimization, pulmonologist evaluation and pre operative optimization, pre operative EGD evaluation. The patient expressed understanding and willingness to comply nicely; allquestions and concerns addressed in details. Patient counseled on the risks, benefits, and alternatives oftreatment plan at length while in the office today. Patient states an understanding and willingness to proceed with the plan. No orders of the defined types were placed in this encounter. No orders of the defined types were placed in this encounter. Follow Up:  Return in about 4 weeks (around 6/16/2021) for Bariatric follow up: diet, exercise & weight loss.       Irene Glass MD, FACS, FICS  Member of the [employer] of Metabolic and Bariatric Surgeons    [phone]    5/19/21

## 2021-05-19 NOTE — PROGRESS NOTES
SUBJECTIVE:    Jaswinder Espana  1990  28 y.o.  female      Chief Complaint   Patient presents with    Referral - General     carpal tunnel     HPI    No Known Allergies    No current outpatient medications on file. No current facility-administered medications for this visit. Past Medical History:   Diagnosis Date    Anxiety     CCC (chronic calculous cholecystitis) 2016    Depression     OCD (obsessive compulsive disorder)     Polycystic ovarian syndrome      Past Surgical History:   Procedure Laterality Date     SECTION  2010, 2016, 11/2016    x3    CHOLECYSTECTOMY, LAPAROSCOPIC  2016    OVARIAN CYST REMOVAL  2014    TONSILLECTOMY  age 10    T & A     TUBAL LIGATION       Social History     Socioeconomic History    Marital status:      Spouse name: None    Number of children: None    Years of education: None    Highest education level: None   Occupational History    None   Tobacco Use    Smoking status: Former Smoker     Years: 9.00     Quit date: 2020     Years since quittin.4    Smokeless tobacco: Never Used   Vaping Use    Vaping Use: Never used   Substance and Sexual Activity    Alcohol use: Never    Drug use: No    Sexual activity: Yes     Partners: Male   Other Topics Concern    None   Social History Narrative    None     Social Determinants of Health     Financial Resource Strain:     Difficulty of Paying Living Expenses:    Food Insecurity:     Worried About Running Out of Food in the Last Year:     Ran Out of Food in the Last Year:    Transportation Needs:     Lack of Transportation (Medical):      Lack of Transportation (Non-Medical):    Physical Activity:     Days of Exercise per Week:     Minutes of Exercise per Session:    Stress:     Feeling of Stress :    Social Connections:     Frequency of Communication with Friends and Family:     Frequency of Social Gatherings with Friends and Family:     Attends Temple Services:     Active Member of Clubs or Organizations:     Attends Club or Organization Meetings:     Marital Status:    Intimate Partner Violence:     Fear of Current or Ex-Partner:     Emotionally Abused:     Physically Abused:     Sexually Abused:      Dr. Jacobson Reap h/o abnormal    Complains of numbness and tingling in her bilateral hands. Worse in right hand. Complains of aching, throbbing in her wrist and up to her elbow. Difficulty driving due to numbness and pain. Using wrist splint with mild relief. She has had symptoms for over 6 months. No neck pain or numbness tingling in upper arm. Requesting referral to [facility]vaez in [place]. Morbid obesity with BMI of 40.0-44.9, adult ([address]  Supposed to have weight loss surgery. She has two more months of her 6 months program. Planning to have gastric sleeve       Review of Systems   Constitutional: Negative for appetite change, chills, fatigue and unexpected weight change. Respiratory: Negative for cough, chest tightness, shortness of breath and wheezing. Cardiovascular: Negative for chest pain, palpitations and leg swelling. Gastrointestinal: Negative for abdominal pain, constipation, diarrhea, nausea and vomiting. Musculoskeletal: Negative for arthralgias. Neurological: Positive for numbness. Negative for weakness and headaches. Hematological: Negative for adenopathy. OBJECTIVE:    /70 (Site: Left Upper Arm, Position: Sitting, Cuff Size: Large Adult)   Pulse 92   Temp 97 °F (36.1 °C) (Temporal)   Resp 16   Wt 243 lb 3.2 oz (110.3 kg)   LMP 04/20/2021   SpO2 98%   BMI 41.10 kg/m²     Physical Exam  Constitutional:       Appearance: Normal appearance. She is well-developed. HENT:      Head: Normocephalic and atraumatic. Right Ear: Hearing normal.      Left Ear: Hearing normal.   Cardiovascular:      Rate and Rhythm: Normal rate and regular rhythm. Heart sounds: Normal heart sounds.  No murmur heard. No friction rub. No gallop. Pulmonary:      Effort: Pulmonary effort is normal.      Breath sounds: Normal breath sounds. No wheezing, rhonchi or rales. Abdominal:      General: Bowel sounds are normal. There is no distension. Palpations: Abdomen is soft. Tenderness: There is no abdominal tenderness. There is no guarding. Musculoskeletal:         General: Normal range of motion. Cervical back: Normal range of motion and neck supple. Skin:     General: Skin is warm and dry. Neurological:      General: No focal deficit present. Mental Status: She is alert and oriented to person, place, and time. Comments: Positive right phalen  Positive right arm raise  Positive right tinel   Psychiatric:         Mood and Affect: Mood normal.         Behavior: Behavior normal. Behavior is cooperative. Thought Content: Thought content normal.         ASSESSMENT/PLAN:    1. Morbid obesity with BMI of 40.0-44.9, adult ([address]  Continue weight management regimen for surgery. 2. Numbness and tingling in right hand  Referred to ortho as requested. Likely carpal tunnel. - External Referral To Orthopedic Surgery    3. Carpal tunnel syndrome of right wrist  - External Referral To Orthopedic Surgery               Return if symptoms worsen or fail to improve.       (Please note that portions of this note may have been completed with a voice recognition program. Efforts were made to edit the dictations but occasionally words aremis-transcribed.)

## 2021-05-19 NOTE — ASSESSMENT & PLAN NOTE
Supposed to have weight loss surgery.  She has two more months of her 6 months program. Planning to have gastric sleeve

## 2021-05-25 DIAGNOSIS — Z01.818 PRE-OP TESTING: Primary | ICD-10-CM

## 2021-05-26 ENCOUNTER — HOSPITAL ENCOUNTER (OUTPATIENT)
Age: 31
Discharge: HOME | End: 2021-05-26
Payer: COMMERCIAL

## 2021-05-26 PROCEDURE — U0005 INFEC AGEN DETEC AMPLI PROBE: HCPCS

## 2021-05-26 PROCEDURE — U0003 INFECTIOUS AGENT DETECTION BY NUCLEIC ACID (DNA OR RNA); SEVERE ACUTE RESPIRATORY SYNDROME CORONAVIRUS 2 (SARS-COV-2) (CORONAVIRUS DISEASE [COVID-19]), AMPLIFIED PROBE TECHNIQUE, MAKING USE OF HIGH THROUGHPUT TECHNOLOGIES AS DESCRIBED BY CMS-2020-01-R: HCPCS

## 2021-05-27 LAB
SARS-COV-2: NOT DETECTED
SOURCE: NORMAL

## 2021-05-27 NOTE — PROGRESS NOTES
.Surgery 6/1/21-   you will be called 5/28/21 with times               1. Do not eat or drink anything after midnight - unless instructed by your doctor prior to surgery. This includes                   no water, chewing gum or mints. 2. Follow your directions as prescribed by the doctor for your procedure and medications. 3. Check with your Doctor regarding stopping Plavix, Coumadin, Lovenox,Effient,Pradaxa,Xarelto, Fragmin or other blood thinners and                   follow their instructions. 4. Do not smoke, and do not drink any alcoholic beverages 24 hours prior to surgery. This includes NA Beer. 5. You may brush your teeth and gargle the morning of surgery. DO NOT SWALLOW WATER   6. You MUST make arrangements for a responsible adult to take you home after your surgery and be able to check on you every couple                   hours for the day. You will not be allowed to leave alone or drive yourself home. It is strongly suggested someone stay with you the first 24                   hrs. Your surgery will be cancelled if you do not have a ride home. 7. Please wear simple, loose fitting clothing to the hospital.  Isi Fish not bring valuables (money, credit cards, checkbooks, etc.) Do not wear any                   makeup (including no eye makeup) or nail polish on your fingers or toes. 8. DO NOT wear any jewelry or piercings on day of surgery. All body piercing jewelry must be removed. 9. If you have dentures, they will be removed before going to the OR; we will provide you a container. If you wear contact lenses or glasses,                  they will be removed; please bring a case for them. 10. If you  have a Living Will and Durable Power of  for Healthcare, please bring in a copy. 11. Please bring picture ID,  insurance card, paperwork from the doctors office    (H & P, Consent, & card for implantable devices).            12. Take a shower the

## 2021-05-29 ENCOUNTER — ANESTHESIA EVENT (OUTPATIENT)
Dept: ENDOSCOPY | Age: 31
End: 2021-05-29
Payer: COMMERCIAL

## 2021-05-29 NOTE — ANESTHESIA PRE PROCEDURE
Department of Anesthesiology  Preprocedure Note       Name:  Juan David Rockwell   Age:  28 y.o.  :  1990                                          MRN:  [id number]         Date:  2021      Surgeon: Licha Mckeon):  Becky Barrera MD    Procedure: Procedure(s):  EGD BIOPSY    Medications prior to admission:   Prior to Admission medications    Not on File       Current medications:    No current facility-administered medications for this encounter. No current outpatient medications on file. Allergies:  No Known Allergies    Problem List:    Patient Active Problem List   Diagnosis Code    CCC (chronic calculous cholecystitis) K80.10    Pilar cyst L72.11    Former smoker Z87.891    Morbid obesity with BMI of 40.0-44.9, adult ([facility] E66.01, Z68.41       Past Medical History:        Diagnosis Date    Anxiety     Depression     OCD (obsessive compulsive disorder)     Polycystic ovarian syndrome        Past Surgical History:        Procedure Laterality Date     SECTION  2010, 2016, 11/2016    x3    CHOLECYSTECTOMY, LAPAROSCOPIC  2016    OVARIAN CYST REMOVAL  2014    TONSILLECTOMY  age 10    T & A     TUBAL LIGATION  2016       Social History:    Social History     Tobacco Use    Smoking status: Former Smoker     Years: 9.00     Quit date: 2020     Years since quittin.4    Smokeless tobacco: Never Used   Substance Use Topics    Alcohol use: Never                                Counseling given: Not Answered      Vital Signs (Current): There were no vitals filed for this visit.                                            BP Readings from Last 3 Encounters:   21 138/88   21 110/70   21 130/80       NPO Status:                                                                                 BMI:   Wt Readings from Last 3 Encounters:   21 242 lb 9.6 oz (110 kg)   21 243 lb 3.2 oz (110.3 kg)   21 245 lb (111.1 kg)     There is no height or weight on file to calculate BMI.    CBC:   Lab Results   Component Value Date    WBC 13.6 02/10/2021    RBC 4.29 02/10/2021    HGB 13.0 02/10/2021    HCT 38.5 02/10/2021    MCV 89.7 02/10/2021    RDW 12.7 02/10/2021     02/10/2021       CMP:   Lab Results   Component Value Date     02/10/2021    K 4.5 02/10/2021     02/10/2021    CO2 28 02/10/2021    BUN 21 02/10/2021    CREATININE 0.9 02/10/2021    GFRAA >60 02/10/2021    AGRATIO 1.7 2019    LABGLOM >60 02/10/2021    GLUCOSE 97 02/10/2021    PROT 7.1 2021    CALCIUM 9.4 02/10/2021    BILITOT 0.3 2021    ALKPHOS 77 2021    AST 26 2021    ALT 28 2021       POC Tests: No results for input(s): POCGLU, POCNA, POCK, POCCL, POCBUN, POCHEMO, POCHCT in the last 72 hours. Coags: No results found for: PROTIME, INR, APTT    HCG (If Applicable):   Lab Results   Component Value Date    PREGTESTUR NEGATIVE 2016        ABGs: No results found for: PHART, PO2ART, WIP3YYD, DSI8VQW, BEART, J8IXTZCH     Type & Screen (If Applicable):  No results found for: LABABO, LABRH    Drug/Infectious Status (If Applicable):  Lab Results   Component Value Date    HEPCAB 0.18 2015       COVID-19 Screening (If Applicable):   Lab Results   Component Value Date    COVID19 NOT DETECTED 2021           Anesthesia Evaluation  Patient summary reviewed no history of anesthetic complications:   Airway: Mallampati: II  TM distance: >3 FB   Neck ROM: full  Mouth opening: > = 3 FB Dental: normal exam         Pulmonary: breath sounds clear to auscultation                            ROS comment: Former Smoker  Quit Smokin20       Cardiovascular:  Exercise tolerance: good (>4 METS),           Rhythm: regular  Rate: normal           Beta Blocker:  Not on Beta Blocker      ROS comment: Echo 2021:  Summary   Left ventricular systolic function is normal with an ejection fraction of   55-60%.    The left atrium is mildly dilated. No significant valvular disease or diastolic dysfunction noted. Normal pulmonary artery pressure with a RVSP of 28 mmHg. No evidence of pericardial effusion. Neuro/Psych:   (+) psychiatric history (OCD):depression/anxiety             GI/Hepatic/Renal:   (+) morbid obesity (bmi 41.6)          Endo/Other: Negative Endo/Other ROS                    Abdominal:           Vascular: negative vascular ROS. Anesthesia Plan      MAC     ASA 2       Induction: intravenous. Anesthetic plan and risks discussed with patient. Plan discussed with CRNA.                 Malick Number, APRN - CRNA   5/29/2021

## 2021-06-01 ENCOUNTER — HOSPITAL ENCOUNTER (OUTPATIENT)
Age: 31
Setting detail: OUTPATIENT SURGERY
Discharge: HOME | End: 2021-06-01
Attending: SURGERY | Admitting: SURGERY
Payer: COMMERCIAL

## 2021-06-01 ENCOUNTER — ANESTHESIA (OUTPATIENT)
Dept: ENDOSCOPY | Age: 31
End: 2021-06-01
Payer: COMMERCIAL

## 2021-06-01 VITALS
HEIGHT: 64 IN | OXYGEN SATURATION: 98 % | WEIGHT: 242 LBS | RESPIRATION RATE: 16 BRPM | HEART RATE: 77 BPM | BODY MASS INDEX: 41.32 KG/M2 | DIASTOLIC BLOOD PRESSURE: 113 MMHG | SYSTOLIC BLOOD PRESSURE: 165 MMHG | TEMPERATURE: 98.7 F

## 2021-06-01 VITALS — DIASTOLIC BLOOD PRESSURE: 79 MMHG | OXYGEN SATURATION: 99 % | SYSTOLIC BLOOD PRESSURE: 135 MMHG

## 2021-06-01 LAB — PREGNANCY TEST URINE, POC: NEGATIVE

## 2021-06-01 PROCEDURE — 87077 CULTURE AEROBIC IDENTIFY: CPT

## 2021-06-01 PROCEDURE — 43239 EGD BIOPSY SINGLE/MULTIPLE: CPT | Performed by: SURGERY

## 2021-06-01 PROCEDURE — 88305 TISSUE EXAM BY PATHOLOGIST: CPT | Performed by: PATHOLOGY

## 2021-06-01 PROCEDURE — 2580000003 HC RX 258: Performed by: ANESTHESIOLOGY

## 2021-06-01 PROCEDURE — 3700000000 HC ANESTHESIA ATTENDED CARE: Performed by: SURGERY

## 2021-06-01 PROCEDURE — 3700000001 HC ADD 15 MINUTES (ANESTHESIA): Performed by: SURGERY

## 2021-06-01 PROCEDURE — 7100000011 HC PHASE II RECOVERY - ADDTL 15 MIN: Performed by: SURGERY

## 2021-06-01 PROCEDURE — 88342 IMHCHEM/IMCYTCHM 1ST ANTB: CPT | Performed by: PATHOLOGY

## 2021-06-01 PROCEDURE — 7100000010 HC PHASE II RECOVERY - FIRST 15 MIN: Performed by: SURGERY

## 2021-06-01 PROCEDURE — 6360000002 HC RX W HCPCS: Performed by: NURSE ANESTHETIST, CERTIFIED REGISTERED

## 2021-06-01 PROCEDURE — 2709999900 HC NON-CHARGEABLE SUPPLY: Performed by: SURGERY

## 2021-06-01 PROCEDURE — 3609012400 HC EGD TRANSORAL BIOPSY SINGLE/MULTIPLE: Performed by: SURGERY

## 2021-06-01 PROCEDURE — 81025 URINE PREGNANCY TEST: CPT

## 2021-06-01 PROCEDURE — 2500000003 HC RX 250 WO HCPCS: Performed by: NURSE ANESTHETIST, CERTIFIED REGISTERED

## 2021-06-01 RX ORDER — SODIUM CHLORIDE, SODIUM LACTATE, POTASSIUM CHLORIDE, CALCIUM CHLORIDE 600; 310; 30; 20 MG/100ML; MG/100ML; MG/100ML; MG/100ML
INJECTION, SOLUTION INTRAVENOUS CONTINUOUS
Status: DISCONTINUED | OUTPATIENT
Start: 2021-06-01 | End: 2021-06-01 | Stop reason: HOSPADM

## 2021-06-01 RX ORDER — PROPOFOL 10 MG/ML
INJECTION, EMULSION INTRAVENOUS PRN
Status: DISCONTINUED | OUTPATIENT
Start: 2021-06-01 | End: 2021-06-01 | Stop reason: SDUPTHER

## 2021-06-01 RX ORDER — LIDOCAINE HYDROCHLORIDE 20 MG/ML
INJECTION, SOLUTION EPIDURAL; INFILTRATION; INTRACAUDAL; PERINEURAL PRN
Status: DISCONTINUED | OUTPATIENT
Start: 2021-06-01 | End: 2021-06-01 | Stop reason: SDUPTHER

## 2021-06-01 RX ORDER — PANTOPRAZOLE SODIUM 40 MG/1
40 TABLET, DELAYED RELEASE ORAL 2 TIMES DAILY
Qty: 60 TABLET | Refills: 3 | Status: ON HOLD | OUTPATIENT
Start: 2021-06-01 | End: 2021-08-10 | Stop reason: HOSPADM

## 2021-06-01 RX ADMIN — PROPOFOL 260 MG: 10 INJECTION, EMULSION INTRAVENOUS at 10:25

## 2021-06-01 RX ADMIN — LIDOCAINE HYDROCHLORIDE 100 MG: 20 INJECTION, SOLUTION EPIDURAL; INFILTRATION; INTRACAUDAL; PERINEURAL at 10:25

## 2021-06-01 RX ADMIN — SODIUM CHLORIDE, POTASSIUM CHLORIDE, SODIUM LACTATE AND CALCIUM CHLORIDE: 600; 310; 30; 20 INJECTION, SOLUTION INTRAVENOUS at 09:15

## 2021-06-01 ASSESSMENT — PAIN SCALES - GENERAL
PAINLEVEL_OUTOF10: 0
PAINLEVEL_OUTOF10: 0

## 2021-06-01 ASSESSMENT — PAIN - FUNCTIONAL ASSESSMENT: PAIN_FUNCTIONAL_ASSESSMENT: 0-10

## 2021-06-01 NOTE — PROGRESS NOTES
Patient discharge instructions and prescriptions reviewed and verified. RN reviewed d/c instructions with patient and her mother. All questions answered. Prescription information given to patient. Discharge paperwork signed by RN and patient's mother. Discharged to car  via wheelchair, home with mother.

## 2021-06-01 NOTE — PROGRESS NOTES
Patient returned to room from endo, report at bedside received from TIFFANY Hester+JULIEN Bermudez (see doc flow), assessment completed as per doc flow. Patient has no c/o pain. Beverage offered. Call light in reach, bed in low position. RN to continue to monitor. Family at bedside.

## 2021-06-01 NOTE — H&P
HISTORY AND PHYSICAL EXAM    Date of Admission:  2021    PCP:  PAULO Lr CNP    Chief Complaint / History of Present Illness:  Lisa Fitzgerald is a very pleasant 28 y.o. female who presents today for her preop EGD eval before her bariatric procedure. As noted her Body mass index is 41.54 kg/m². with significant co-morbidities as below. The patient has been complying with the pre-operative workup, lifestyle modifications and changes with the bariatric clinic, including:  Dietitian evaluation and counseling, psychologist evaluation and counseling, Exercise physiologist evaluation and counseling, cardiac preoperative clearance and optimization, pulmonology preoperative clearance and optimization, today will proceed with preoperative EGD for GERD, morbid obesity: Body mass index is 41.54 kg/m². , in preparation for a bariatric procedure; to r/o GERD, Hiatal Hernia, Peptic Ulcer Disease, Gastroparesis, Esophageal dysmotility; etc.    All risks and benefits, potential complications and possible alternatives discussed, and agreeable to proceed. PMH:   has a past medical history of Anxiety, Depression, OCD (obsessive compulsive disorder), and Polycystic ovarian syndrome. PSH:   has a past surgical history that includes ovarian cyst removal (2014); Tonsillectomy (age 10); Cholecystectomy, laparoscopic (2016);  section (2010, 2016, 2016); and Tubal ligation (2016).     Allergies:  No Known Allergies     Home Meds:    Prior to Admission medications    Not on File        Hospital Meds:    Current Facility-Administered Medications   Medication Dose Route Frequency Provider Last Rate Last Admin    lactated ringers infusion   Intravenous Continuous Mayur Mcmanus  mL/hr at 21 0915 New Bag at 21 0915      lactated ringers 100 mL/hr at 21 0915       Social History / Family History:        Social History     Socioeconomic History    Marital status:      Spouse name: Not on file    Number of children: Not on file    Years of education: Not on file    Highest education level: Not on file   Occupational History    Not on file   Tobacco Use    Smoking status: Former Smoker     Years: 9.00     Quit date: 2020     Years since quittin.4    Smokeless tobacco: Never Used   Vaping Use    Vaping Use: Never used   Substance and Sexual Activity    Alcohol use: Never    Drug use: No    Sexual activity: Yes     Partners: Male   Other Topics Concern    Not on file   Social History Narrative    Not on file     Social Determinants of Health     Financial Resource Strain:     Difficulty of Paying Living Expenses:    Food Insecurity:     Worried About Running Out of Food in the Last Year:     [address] in the Last Year:    Transportation Needs:     Lack of Transportation (Medical):  Lack of Transportation (Non-Medical):    Physical Activity:     Days of Exercise per Week:     Minutes of Exercise per Session:    Stress:     Feeling of Stress :    Social Connections:     Frequency of Communication with Friends and Family:     Frequency of Social Gatherings with Friends and Family:     Attends Worship Services:     Active Member of Clubs or Organizations:     Attends Club or Organization Meetings:     Marital Status:    Intimate Partner Violence:     Fear of Current or Ex-Partner:     Emotionally Abused:     Physically Abused:     Sexually Abused:        Review of Systems:  Constitutional: Negative for fever, chills, diaphoresis, appetite change and fatigue. HENT: Negative for sore throat, trouble swallowing and voice change. Respiratory: Negative for cough, positive for shortness of breath no wheezing. Cardiovascular: Negative for chest pain positive for SOB with one flight of stairs exertion, no pitting LE edema.    Gastrointestinal: Negative for nausea, vomiting, diarrhea, constipation, blood in stool, abdominal distention, anal bleeding or rectal pain. Musculoskeletal: Negative for joint swelling and arthralgias. Skin: Warm and dry, well perfused. Neurological: Negative for seizures, syncope, speech difficulty and weakness. Hematological/Lymphatic: Negative for adenopathy. No history of DVT/PE. Does not bruise/bleed easily. Psychiatric/Behavioral: Negative for agitation. Physical Exam:  Vital Signs:   Vitals:    06/01/21 0827   BP: (!) 150/94   Pulse: 87   Resp: 18   Temp: 98.1 °F (36.7 °C)   SpO2: 97%     General appearance: Pt Alert and oriented, in no apparent acute distress. HEENT:  CHUCKIE, EOMI, No JVDs, Bruits, Megalies. Lungs: Clear to auscultation bilaterally. Heart: Regular rate and rhythm, S1, S2 normal, no murmur, rub or gallop. Abdomen: Non tender. Non distended. Positive bowel sounds. No hernias noted, no masses palpable. Extremities: Warm, well perfused, no cyanosis or edema. Skin: Skin color, texture, turgor normal.  Neurologic: Grossly normal, Cranial nerves from II to XII intact. Radiologic / Imaging / TESTING  No results found. Labs:    Recent Results (from the past 24 hour(s))   POC Pregnancy Urine Qual    Collection Time: 06/01/21  8:41 AM   Result Value Ref Range    Preg Test, Ur NEGATIVE NEGATIVE         Diagnosis:  Patient Active Problem List   Diagnosis    CCC (chronic calculous cholecystitis)    Pilar cyst    Former smoker    Morbid obesity with BMI of 40.0-44.9, adult [facility])           Assessment & Plan:    Rosario Fermin is a very pleasant 28 y.o. female who presents today for her preop EGD eval before her bariatric procedure. As noted her Body mass index is 41.54 kg/m². with significant co-morbidities as below.     The patient has been complying with the pre-operative workup, lifestyle modifications and changes with the bariatric clinic, including:  Dietitian evaluation and counseling, psychologist evaluation and counseling, Exercise physiologist evaluation and counseling, cardiac preoperative clearance and optimization, pulmonology preoperative clearance and optimization, today will proceed with preoperative EGD for GERD, morbid obesity: Body mass index is 41.54 kg/m². , in preparation for a bariatric procedure; to r/o GERD, Hiatal Hernia, Peptic Ulcer Disease, Gastroparesis, Esophageal dysmotility; etc.    All risks and benefits, potential complications and possible alternatives discussed, and agreeable to proceed.     ____________________________________________    Oren Saleh MD, FACS, FICS    6/1/2021  9:33 AM

## 2021-06-01 NOTE — PROGRESS NOTES
REPORT RECEIVED FROM  KAYLAN RN IN SDS. PREOP QUESTIONS, NPO STATUS AND ALLERGIES VERIFIED WITH PT. DENIES TAKING BLOOD THINNERS OR BETA BLOCKERS.

## 2021-06-01 NOTE — PROGRESS NOTES
REPORT GIVEN TO KEE RN IN SDS. PT AWAKE AND RESPONSIVE. VS STABLE. DENIES C/O OR NEEDS AT THIS TIME.

## 2021-06-01 NOTE — ANESTHESIA POSTPROCEDURE EVALUATION
Department of Anesthesiology  Postprocedure Note    Patient: Florance Epley  MRN: [id number]  YOB: 1990  Date of evaluation: 6/1/2021  Time:  10:41 AM     Procedure Summary     Date: 06/01/21 Room / Location: [address]    Anesthesia Start: 1020 Anesthesia Stop: 2333    Procedure: EGD BIOPSY (N/A ) Diagnosis: (MORBID OBESITY)    Surgeons: Milan Callejas MD Responsible Provider: Jaycob Shaw DO    Anesthesia Type: MAC ASA Status: 2          Anesthesia Type: MAC    Shannon Phase I:  10    Shannon Phase II:  10    Last vitals: Reviewed and per EMR flowsheets.        Anesthesia Post Evaluation    Patient location during evaluation: bedside  Patient participation: complete - patient participated  Level of consciousness: awake and alert  Pain score: 0  Airway patency: patent  Nausea & Vomiting: no nausea and no vomiting  Complications: no  Cardiovascular status: hemodynamically stable  Respiratory status: acceptable, room air, spontaneous ventilation and nonlabored ventilation  Hydration status: euvolemic

## 2021-06-02 LAB — CLOTEST: NEGATIVE

## 2021-06-30 ENCOUNTER — OFFICE VISIT (OUTPATIENT)
Dept: BARIATRICS/WEIGHT MGMT | Age: 31
End: 2021-06-30
Payer: COMMERCIAL

## 2021-06-30 VITALS
HEART RATE: 89 BPM | TEMPERATURE: 97.6 F | BODY MASS INDEX: 40.79 KG/M2 | DIASTOLIC BLOOD PRESSURE: 96 MMHG | OXYGEN SATURATION: 97 % | WEIGHT: 244.8 LBS | SYSTOLIC BLOOD PRESSURE: 140 MMHG | HEIGHT: 65 IN

## 2021-06-30 DIAGNOSIS — Z01.818 PRE-OP TESTING: Primary | ICD-10-CM

## 2021-06-30 DIAGNOSIS — E66.01 MORBID OBESITY WITH BMI OF 40.0-44.9, ADULT (HCC): ICD-10-CM

## 2021-06-30 DIAGNOSIS — Z01.818 PREOP TESTING: Primary | ICD-10-CM

## 2021-06-30 DIAGNOSIS — E66.01 MORBID OBESITY WITH BMI OF 40.0-44.9, ADULT (HCC): Primary | ICD-10-CM

## 2021-06-30 PROCEDURE — G8427 DOCREV CUR MEDS BY ELIG CLIN: HCPCS | Performed by: SURGERY

## 2021-06-30 PROCEDURE — G8417 CALC BMI ABV UP PARAM F/U: HCPCS | Performed by: SURGERY

## 2021-06-30 PROCEDURE — 1036F TOBACCO NON-USER: CPT | Performed by: SURGERY

## 2021-06-30 PROCEDURE — 99213 OFFICE O/P EST LOW 20 MIN: CPT | Performed by: SURGERY

## 2021-06-30 ASSESSMENT — ENCOUNTER SYMPTOMS
ANAL BLEEDING: 0
VOMITING: 0
DIARRHEA: 0
SHORTNESS OF BREATH: 1
ABDOMINAL PAIN: 1
TROUBLE SWALLOWING: 0
PHOTOPHOBIA: 0
BACK PAIN: 1
SORE THROAT: 0
BLOOD IN STOOL: 0
VOICE CHANGE: 0
COLOR CHANGE: 0
COUGH: 0
ABDOMINAL DISTENTION: 1
CONSTIPATION: 0
WHEEZING: 0
NAUSEA: 0

## 2021-06-30 NOTE — PROGRESS NOTES
BARIATRIC SURGERY OFFICE NOTE    SUBJECTIVE:    Patient presenting today originally referred from Vivi Shearer, PAULO - CNP, for   Chief Complaint   Patient presents with    Follow-up     6th f/u wm surg visit   . HPI: Eloise Allan is a 28 y.o. female being seen for follow up for bariatric weight loss surgery. Damaris month weight gain/loss was + 2.2 Lbs. Addressed the status of the following co-morbidities:   1. Depression  improving. 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 .  3. Anxiety  worsening. Needs the sleeve gastrectomy. Thoroughly reviewed the patient's medical history, family history, social history and review of systems with the patient today in theoffice. Please see medical record for pertinent positives. Past Medical History:   Diagnosis Date    Anxiety     Depression     OCD (obsessive compulsive disorder)     Polycystic ovarian syndrome       Past Surgical History:   Procedure Laterality Date     SECTION  2010, 2016, 11/2016    x3    CHOLECYSTECTOMY, LAPAROSCOPIC  2016    OVARIAN CYST REMOVAL  2014    TONSILLECTOMY  age 10    T & A     TUBAL LIGATION  2016    UPPER GASTROINTESTINAL ENDOSCOPY N/A 2021    EGD BIOPSY performed by Gonzalez Dowell MD at [facility] ENDOSCOPY     Current Outpatient Medications   Medication Sig Dispense Refill    pantoprazole (PROTONIX) 40 MG tablet Take 1 tablet by mouth 2 times daily 60 tablet 3     No current facility-administered medications for this visit. No Known Allergies        Review of Systems   Constitutional: Positive for fatigue. Negative for activity change, chills, diaphoresis and fever. HENT: Negative for sore throat, trouble swallowing and voice change. Eyes: Negative for photophobia and visual disturbance. Respiratory: Positive for shortness of breath. Negative for cough and wheezing. Cardiovascular: Positive for leg swelling. Negative for chest pain and palpitations. Gastrointestinal: Positive for abdominal distention and abdominal pain. Negative for anal bleeding, blood in stool, constipation, diarrhea, nausea and vomiting. Endocrine: Positive for polyphagia. Negative for cold intolerance, heat intolerance, polydipsia and polyuria. Genitourinary: Positive for urgency. Negative for dysuria, frequency and hematuria. Musculoskeletal: Positive for arthralgias, back pain and gait problem. Negative for joint swelling, myalgias and neck stiffness. Skin: Negative for color change and rash. Neurological: Negative for seizures, speech difficulty, light-headedness and numbness. Hematological: Negative for adenopathy. Does not bruise/bleed easily. Psychiatric/Behavioral: Positive for sleep disturbance. The patient is nervous/anxious. OBJECTIVE:    Vitals:    06/30/21 0942   BP: (!) 140/96   Pulse: 89   Temp: 97.6 °F (36.4 °C)   SpO2: 97%     Body mass index is 41.37 kg/m². Physical Exam  Vitals reviewed. Constitutional:       General: She is not in acute distress. Appearance: She is well-developed. She is not diaphoretic. HENT:      Head: Normocephalic and atraumatic. Eyes:      General: No scleral icterus. Conjunctiva/sclera: Conjunctivae normal.      Pupils: Pupils are equal, round, and reactive to light. Neck:      Thyroid: No thyromegaly. Vascular: No JVD. Trachea: No tracheal deviation. Cardiovascular:      Rate and Rhythm: Normal rate and regular rhythm. Heart sounds: Normal heart sounds. No murmur heard. No friction rub. No gallop. Pulmonary:      Effort: Pulmonary effort is normal. No respiratory distress. Breath sounds: No stridor. No wheezing or rales. Chest:      Chest wall: No tenderness. Abdominal:      General: Bowel sounds are normal. There is no distension. Palpations: Abdomen is soft. There is no mass. Tenderness: There is no abdominal tenderness. There is no guarding or rebound. Musculoskeletal:         General: No tenderness. Normal range of motion. Cervical back: Normal range of motion. Lymphadenopathy:      Cervical: No cervical adenopathy. Skin:     General: Skin is warm and dry. Coloration: Skin is not pale. Findings: No erythema or rash. Neurological:      Mental Status: She is alert and oriented to person, place, and time. Cranial Nerves: No cranial nerve deficit. Coordination: Coordination normal.   Psychiatric:         Behavior: Behavior normal.         Thought Content: Thought content normal.         Judgment: Judgment normal.           ASSESSMENT & PLAN:    1. Morbid obesity with BMI of 40.0-44.9, adult ([address]         Needs the sleeve gastrectomy, cleared ALL card / pulm / psych and EGD      Patient was encouraged to journal all food intake. Keep calorie level atapproximately 9424-4525. Protein intake is to be a minimum of 60-80 grams per day. Water drinking was encouraged with a goal of 64oz-128oz daily. Beverages to be calorie free except for milk. Every other beverage should bewater. They are to avoid soda. Continue to increase level of physical activity. I counseled the patient regarding diet and exercise, in preparation for her planned Robotic Sleeve Gastrectomy. Counting calories, complying with the dietitian's recommendations, and complying with the preoperative workup including the dietitian counseling, exercise physiologist counseling,cardiologist evaluation and pre-operative optimization, pulmonologist evaluation and pre operative optimization, pre operative EGD evaluation. The patient expressed understanding and willingness to comply nicely; allquestions and concerns addressed in details. Patient counseled on the risks, benefits, and alternatives oftreatment plan at length while in the office today. Patient states an understanding and willingness to proceed with the plan.     Follow Up:  Return in about 4 weeks (around 7/28/2021) for Surgery.       Marlene Mathis MD, FACS, FICS  Member of the [employer] of Metabolic and Bariatric Surgeons    [phone]    6/30/21

## 2021-07-01 ENCOUNTER — HOSPITAL ENCOUNTER (OUTPATIENT)
Age: 31
Discharge: HOME | End: 2021-07-01
Payer: COMMERCIAL

## 2021-07-01 LAB
AMPHETAMINES: NEGATIVE
BARBITURATE SCREEN URINE: NEGATIVE
BENZODIAZEPINE SCREEN, URINE: NEGATIVE
CANNABINOID SCREEN URINE: NEGATIVE
COCAINE METABOLITE: NEGATIVE
OPIATES, URINE: NEGATIVE
OXYCODONE: NEGATIVE
PHENCYCLIDINE, URINE: NEGATIVE

## 2021-07-01 PROCEDURE — G0480 DRUG TEST DEF 1-7 CLASSES: HCPCS

## 2021-07-01 PROCEDURE — 36415 COLL VENOUS BLD VENIPUNCTURE: CPT

## 2021-07-01 PROCEDURE — 80307 DRUG TEST PRSMV CHEM ANLYZR: CPT

## 2021-07-04 LAB
3-OH-COTININE URINE: <50 NG/ML
ANABASINE URINE: <3 NG/ML
COTININE, URINE: <5 NG/ML
NICOTINE AND METABOLITES: <2 NG/ML
NORNICOTINE URINE: <2 NG/ML

## 2021-07-05 LAB
3-OH-COTININE: <2 NG/ML
COTININE: <2 NG/ML
NICOTINE: <2 NG/ML

## 2021-07-16 ENCOUNTER — TELEPHONE (OUTPATIENT)
Dept: BARIATRICS/WEIGHT MGMT | Age: 31
End: 2021-07-16

## 2021-07-16 NOTE — TELEPHONE ENCOUNTER
[facility] denied bariatric surgery. Stating patient needs to be made aware that there will be follow ups and post surgical nutrition visits.  Set up peer to peer with Dr. Nany Palma on 7/21/21 at 9am-10am

## 2021-07-22 ENCOUNTER — TELEPHONE (OUTPATIENT)
Dept: BARIATRICS/WEIGHT MGMT | Age: 31
End: 2021-07-22

## 2021-07-22 NOTE — TELEPHONE ENCOUNTER
Anabell Jessica #[id number]     8/9/21 thru 8/10/21     CPT 12769     ICD 10 E66.01     [facility] 8/9/21

## 2021-07-26 ENCOUNTER — TELEPHONE (OUTPATIENT)
Dept: BARIATRICS/WEIGHT MGMT | Age: 31
End: 2021-07-26

## 2021-07-26 RX ORDER — SODIUM CHLORIDE, SODIUM LACTATE, POTASSIUM CHLORIDE, CALCIUM CHLORIDE 600; 310; 30; 20 MG/100ML; MG/100ML; MG/100ML; MG/100ML
INJECTION, SOLUTION INTRAVENOUS CONTINUOUS
Status: CANCELLED | OUTPATIENT
Start: 2021-08-09

## 2021-07-26 RX ORDER — HEPARIN SODIUM 5000 [USP'U]/ML
5000 INJECTION, SOLUTION INTRAVENOUS; SUBCUTANEOUS ONCE
Status: CANCELLED | OUTPATIENT
Start: 2021-08-09

## 2021-07-26 NOTE — TELEPHONE ENCOUNTER
Scheduled Sleeve Gastrectomy at [facility]  Surgery: 8/9/21 at 930am.   Consent appt: already signed  PAT: 7/27/21 at 930am.  Preop diet: 7/27/21 at [facility]: 8/3/21 at [facility],  [address] in: 8/6/21 at 1130am  P/O appt: 8/13/21 at 245pm  Prep: NPO after midnight. Hold Blood thinners if taking any  Called and left message for Damaris to call back.

## 2021-07-26 NOTE — PROGRESS NOTES
.Surgery 8/9/21, you will be called 8/6/21 with times               1. Do not eat or drink anything after midnight - unless instructed by your doctor prior to surgery. This includes                   no water, chewing gum or mints. 2. Follow your directions as prescribed by the doctor for your procedure and medications. Take Protonix in am with a sip. 3. Check with your Doctor regarding stopping Plavix, Coumadin, Lovenox,Effient,Pradaxa,Xarelto, Fragmin or other blood thinners and                   follow their instructions. 4. Do not smoke, and do not drink any alcoholic beverages 24 hours prior to surgery. This includes NA Beer. 5. You may brush your teeth and gargle the morning of surgery. DO NOT SWALLOW WATER   6. You MUST make arrangements for a responsible adult to take you home after your surgery and be able to check on you every couple                   hours for the day. You will not be allowed to leave alone or drive yourself home. It is strongly suggested someone stay with you the first 24                   hrs. Your surgery will be cancelled if you do not have a ride home. 7. Please wear simple, loose fitting clothing to the hospital.  Charlie Vorax not bring valuables (money, credit cards, checkbooks, etc.) Do not wear any                   makeup (including no eye makeup) or nail polish on your fingers or toes. 8. DO NOT wear any jewelry or piercings on day of surgery. All body piercing jewelry must be removed. 9. If you have dentures, they will be removed before going to the OR; we will provide you a container. If you wear contact lenses or glasses,                  they will be removed; please bring a case for them. 10. If you  have a Living Will and Durable Power of  for Healthcare, please bring in a copy. 11. Please bring picture ID,  insurance card, paperwork from the doctors office    (H & P, Consent, & card for implantable devices). 12. Take a shower the night before or morning of your procedure, do not apply any lotion, oil or powder. 13. Wear a mask covering your nose & mouth when entering the hospital. Have your covid-19 test performed within 10 days of your                  Surgery - scheduled for test 8/3/21 . Quarantine yourself after the test until after your surgery.

## 2021-07-27 ENCOUNTER — OFFICE VISIT (OUTPATIENT)
Dept: BARIATRICS/WEIGHT MGMT | Age: 31
End: 2021-07-27

## 2021-07-27 ENCOUNTER — HOSPITAL ENCOUNTER (OUTPATIENT)
Age: 31
Discharge: HOME | End: 2021-07-27
Payer: COMMERCIAL

## 2021-07-27 ENCOUNTER — HOSPITAL ENCOUNTER (OUTPATIENT)
Dept: PREADMISSION TESTING | Age: 31
Discharge: HOME | End: 2021-07-27

## 2021-07-27 VITALS — WEIGHT: 244.4 LBS | BODY MASS INDEX: 40.72 KG/M2 | HEIGHT: 65 IN

## 2021-07-27 DIAGNOSIS — E66.01 MORBID OBESITY WITH BMI OF 40.0-44.9, ADULT (HCC): Primary | ICD-10-CM

## 2021-07-27 LAB
ALBUMIN SERPL-MCNC: 5 GM/DL (ref 3.4–5)
ALP BLD-CCNC: 85 IU/L (ref 40–129)
ALT SERPL-CCNC: 24 U/L (ref 10–40)
AMPHETAMINES: NEGATIVE
ANION GAP SERPL CALCULATED.3IONS-SCNC: 12 MMOL/L (ref 4–16)
AST SERPL-CCNC: 19 IU/L (ref 15–37)
BARBITURATE SCREEN URINE: NEGATIVE
BASOPHILS ABSOLUTE: 0.1 K/CU MM
BASOPHILS RELATIVE PERCENT: 0.4 % (ref 0–1)
BENZODIAZEPINE SCREEN, URINE: NEGATIVE
BILIRUB SERPL-MCNC: 0.4 MG/DL (ref 0–1)
BUN BLDV-MCNC: 21 MG/DL (ref 6–23)
CALCIUM SERPL-MCNC: 9.9 MG/DL (ref 8.3–10.6)
CANNABINOID SCREEN URINE: NEGATIVE
CHLORIDE BLD-SCNC: 100 MMOL/L (ref 99–110)
CO2: 26 MMOL/L (ref 21–32)
COCAINE METABOLITE: NEGATIVE
CREAT SERPL-MCNC: 0.8 MG/DL (ref 0.6–1.1)
DIFFERENTIAL TYPE: ABNORMAL
EOSINOPHILS ABSOLUTE: 0.2 K/CU MM
EOSINOPHILS RELATIVE PERCENT: 1.7 % (ref 0–3)
ESTIMATED AVERAGE GLUCOSE: 117 MG/DL
GFR AFRICAN AMERICAN: >60 ML/MIN/1.73M2
GFR NON-AFRICAN AMERICAN: >60 ML/MIN/1.73M2
GLUCOSE BLD-MCNC: 77 MG/DL (ref 70–99)
GONADOTROPIN, CHORIONIC (HCG) QUANT: 0.5 UIU/ML
HBA1C MFR BLD: 5.7 % (ref 4.2–6.3)
HCT VFR BLD CALC: 41.3 % (ref 37–47)
HEMOGLOBIN: 13.7 GM/DL (ref 12.5–16)
IMMATURE NEUTROPHIL %: 0.3 % (ref 0–0.43)
LYMPHOCYTES ABSOLUTE: 2.7 K/CU MM
LYMPHOCYTES RELATIVE PERCENT: 23.8 % (ref 24–44)
MCH RBC QN AUTO: 29.2 PG (ref 27–31)
MCHC RBC AUTO-ENTMCNC: 33.2 % (ref 32–36)
MCV RBC AUTO: 88.1 FL (ref 78–100)
MONOCYTES ABSOLUTE: 0.8 K/CU MM
MONOCYTES RELATIVE PERCENT: 7.1 % (ref 0–4)
NUCLEATED RBC %: 0 %
OPIATES, URINE: NEGATIVE
OXYCODONE: NEGATIVE
PDW BLD-RTO: 12.2 % (ref 11.7–14.9)
PHENCYCLIDINE, URINE: NEGATIVE
PLATELET # BLD: 441 K/CU MM (ref 140–440)
PMV BLD AUTO: 9 FL (ref 7.5–11.1)
POTASSIUM SERPL-SCNC: 4.3 MMOL/L (ref 3.5–5.1)
RBC # BLD: 4.69 M/CU MM (ref 4.2–5.4)
SEGMENTED NEUTROPHILS ABSOLUTE COUNT: 7.6 K/CU MM
SEGMENTED NEUTROPHILS RELATIVE PERCENT: 66.7 % (ref 36–66)
SODIUM BLD-SCNC: 138 MMOL/L (ref 135–145)
TOTAL IMMATURE NEUTOROPHIL: 0.03 K/CU MM
TOTAL NUCLEATED RBC: 0 K/CU MM
TOTAL PROTEIN: 7.7 GM/DL (ref 6.4–8.2)
WBC # BLD: 11.4 K/CU MM (ref 4–10.5)

## 2021-07-27 PROCEDURE — 84702 CHORIONIC GONADOTROPIN TEST: CPT

## 2021-07-27 PROCEDURE — 80307 DRUG TEST PRSMV CHEM ANLYZR: CPT

## 2021-07-27 PROCEDURE — 80053 COMPREHEN METABOLIC PANEL: CPT

## 2021-07-27 PROCEDURE — 83036 HEMOGLOBIN GLYCOSYLATED A1C: CPT

## 2021-07-27 PROCEDURE — 85025 COMPLETE CBC W/AUTO DIFF WBC: CPT

## 2021-07-27 PROCEDURE — 36415 COLL VENOUS BLD VENIPUNCTURE: CPT

## 2021-07-27 PROCEDURE — 99999 PR OFFICE/OUTPT VISIT,PROCEDURE ONLY: CPT

## 2021-07-27 NOTE — PROGRESS NOTES
Outpatient Nutrition Counseling    REASON FOR VISIT: Pre-Op Diet Class    Chief Complaint:    Chief Complaint   Patient presents with    Weight Management       SUBJECTIVE:  Pt here for pre-op diet class. Instructed on 2 week liquid liver shrinking diet and complete post-op diet progression including tips for N/V, fluid/activity logs, recipes and vitamins. Pt verbalized understanding. The patient is a 28 y.o. female being seen for morbid obesity, considering weight loss surgery; Damaris's, Height: 5' 4.5\" (163.8 cm), Weight: 244 lb 6.4 oz (110.9 kg), Current Body mass index is 41.3 kg/m². The patient's PCP is PAULO Mccall CNP     Comorbid Conditions:  Significant diseases affecting this patient are   Past Medical History:   Diagnosis Date    Anxiety     Depression     Gastric ulcer     Taking Protonix    OCD (obsessive compulsive disorder)     Polycystic ovarian syndrome    . Review of Systems - Review of Systems  Otherwise per HPI.     Allergies:  No Known Allergies    Past Surgical History:  Past Surgical History:   Procedure Laterality Date     SECTION  2010, 2016, 11/2016    x3    CHOLECYSTECTOMY, LAPAROSCOPIC  2016    OVARIAN CYST REMOVAL  2014    TONSILLECTOMY  age 10    T & A     TUBAL LIGATION  2016    UPPER GASTROINTESTINAL ENDOSCOPY N/A 2021    EGD BIOPSY performed by Aaron Gonzalez MD at [facility] ENDOSCOPY       Family History:  Family History   Problem Relation Age of Onset    High Blood Pressure Mother     No Known Problems Father     No Known Problems Maternal Grandmother     No Known Problems Maternal Grandfather     Cancer Paternal Grandmother     No Known Problems Paternal Grandfather        Social History:  Social History     Socioeconomic History    Marital status:      Spouse name: Not on file    Number of children: Not on file    Years of education: Not on file    Highest education level: Not on file   Occupational History  Not on file   Tobacco Use    Smoking status: Former Smoker     Years: 9.00     Quit date: 2020     Years since quittin.6    Smokeless tobacco: Never Used   Vaping Use    Vaping Use: Never used   Substance and Sexual Activity    Alcohol use: Never    Drug use: No    Sexual activity: Yes     Partners: Male   Other Topics Concern    Not on file   Social History Narrative    Not on file     Social Determinants of Health     Financial Resource Strain:     Difficulty of Paying Living Expenses:    Food Insecurity:     Worried About Running Out of Food in the Last Year:     [address] in the Last Year:    Transportation Needs:     Lack of Transportation (Medical):      Lack of Transportation (Non-Medical):    Physical Activity:     Days of Exercise per Week:     Minutes of Exercise per Session:    Stress:     Feeling of Stress :    Social Connections:     Frequency of Communication with Friends and Family:     Frequency of Social Gatherings with Friends and Family:     Attends [employer] Services:     Active Member of Clubs or Organizations:     Attends Club or Organization Meetings:     Marital Status:    Intimate Partner Violence:     Fear of Current or Ex-Partner:     Emotionally Abused:     Physically Abused:     Sexually Abused:          OBJECTIVE:  Physical Exam   Ht 5' 4.5\" (1.638 m)   Wt 244 lb 6.4 oz (110.9 kg)   BMI 41.30 kg/m²        NUTRITION DIAGNOSIS: Overweight / Obesity   Problem: Increased adiposity compared to reference standard or established norms   Etiology: Excess intake compared to output over time   S/S: Ht: 64.5\" Wt: 244.4 lbs BMI: 41.30    NUTRITION INTERVENTIONS:    Individualized treatment goals to address nutritiondiagnosis:   Instructed on 600-700 kcal diet for weight loss post-op   Provided fluid/activity logs, recipe book and vitamins handout   Encouraged Physical activity as approved by physician    MONITORING/ EVALUATION/ PLAN:   Pt verbalized understanding of allmaterials covered   Pt asked pertinent questions throughout the session - expect compliance with nutrition guidelines presented   Provided pt with contact information should questions arise prior to next visit   Will f/u with pt post-op  LUANA Warner MS, RDN, LD  7/27/2021

## 2021-08-02 DIAGNOSIS — Z01.818 PRE-OP TESTING: Primary | ICD-10-CM

## 2021-08-03 ENCOUNTER — NURSE ONLY (OUTPATIENT)
Dept: SURGERY | Age: 31
End: 2021-08-03
Payer: COMMERCIAL

## 2021-08-03 ENCOUNTER — HOSPITAL ENCOUNTER (OUTPATIENT)
Age: 31
Setting detail: SPECIMEN
Discharge: HOME | End: 2021-08-03
Payer: COMMERCIAL

## 2021-08-03 VITALS
DIASTOLIC BLOOD PRESSURE: 90 MMHG | WEIGHT: 235.4 LBS | BODY MASS INDEX: 39.22 KG/M2 | HEIGHT: 65 IN | SYSTOLIC BLOOD PRESSURE: 136 MMHG | HEART RATE: 85 BPM

## 2021-08-03 DIAGNOSIS — Z01.818 PRE-OP TESTING: Primary | ICD-10-CM

## 2021-08-03 PROCEDURE — 99211 OFF/OP EST MAY X REQ PHY/QHP: CPT | Performed by: SURGERY

## 2021-08-03 PROCEDURE — U0005 INFEC AGEN DETEC AMPLI PROBE: HCPCS

## 2021-08-03 PROCEDURE — U0003 INFECTIOUS AGENT DETECTION BY NUCLEIC ACID (DNA OR RNA); SEVERE ACUTE RESPIRATORY SYNDROME CORONAVIRUS 2 (SARS-COV-2) (CORONAVIRUS DISEASE [COVID-19]), AMPLIFIED PROBE TECHNIQUE, MAKING USE OF HIGH THROUGHPUT TECHNOLOGIES AS DESCRIBED BY CMS-2020-01-R: HCPCS

## 2021-08-03 NOTE — PATIENT INSTRUCTIONS
Pre-Procedure COVID-19 Self Testing  Quarantine Instructions  Day of Surgery Instructions         What to do before my surgery:    All patients scheduled for elective surgery must test for COVID19 72-96 hours prior to the surgery date.  Pre-Procedure COVID-19 Self-Test will be scheduled for you by your provider.  You can receive your Pre-Procedure COVID-19 Self-Test at:  [facility]. [address] [place] [address] Juan Carlos Hector  [phone]   If you do not have the COVID-19 test we will cancel or reschedule your procedure   Once you test you must quarantine at home until after your procedure with only your immediate family members or whoever lives with you.  If you must work during your quarantine period, we ask that you continue to practice social distancing, wear a mask that covers your mouth and nose and perform all hand hygiene as recommended by the CDC.  If you must go to the grocery, etc. and cannot get someone to do this for you please wear a mask that covers your mouth and nose and perform all hand hygiene as recommended by the CDC.  Your surgeon's office will notify you with any concerns about your test result. What can I expect on the day of surgery?  Arrive at the time the office or hospital staff tell you on the day of your procedure.  Wear a mask when entering the hospital.     A member of the hospital staff will take your temperature and ask you a few questions as you enter the building.  In abundance of caution for the safety of all our patients and staff, please follow all hospital visitor guidelines in place at the time of your procedure. The staff caring for you will stay in close communication with your loved one and keep them updated on progress.  Please provide a phone number for us to use when communicating with your family or ride home.    When you are ready to discharge, we will notify your family/person with you to bring the car to the front entrance. We will take you to them after you receive all of your discharge instructions.

## 2021-08-04 LAB
SARS-COV-2: NOT DETECTED
SOURCE: NORMAL

## 2021-08-06 ENCOUNTER — ANESTHESIA EVENT (OUTPATIENT)
Dept: OPERATING ROOM | Age: 31
DRG: 403 | End: 2021-08-06
Payer: COMMERCIAL

## 2021-08-06 ENCOUNTER — OFFICE VISIT (OUTPATIENT)
Dept: BARIATRICS/WEIGHT MGMT | Age: 31
End: 2021-08-06

## 2021-08-06 VITALS — HEIGHT: 65 IN | WEIGHT: 232.4 LBS | BODY MASS INDEX: 38.72 KG/M2

## 2021-08-06 DIAGNOSIS — E66.9 OBESITY (BMI 30-39.9): Primary | ICD-10-CM

## 2021-08-06 PROCEDURE — 99999 PR OFFICE/OUTPT VISIT,PROCEDURE ONLY: CPT

## 2021-08-06 NOTE — PROGRESS NOTES
Spoke with pt. Arrival time of 0730 given and surgery time of 0930 for 8/9/21. Denied further questions or concerns.

## 2021-08-06 NOTE — ANESTHESIA PRE PROCEDURE
Department of Anesthesiology  Preprocedure Note       Name:  Cherylene Merle   Age:  28 y.o.  :  1990                                          MRN:  [id number]         Date:  2021      Surgeon: Kade Reeves):  Bhavya Barry MD    Procedure: Procedure(s):  GASTRECTOMY SLEEVE LAPAROSCOPIC ROBOTIC    Medications prior to admission:   Prior to Admission medications    Medication Sig Start Date End Date Taking? Authorizing Provider   pantoprazole (PROTONIX) 40 MG tablet Take 1 tablet by mouth 2 times daily 21   Bhavya Barry MD       Current medications:    No current facility-administered medications for this encounter.      Current Outpatient Medications   Medication Sig Dispense Refill    pantoprazole (PROTONIX) 40 MG tablet Take 1 tablet by mouth 2 times daily 60 tablet 3       Allergies:  No Known Allergies    Problem List:    Patient Active Problem List   Diagnosis Code    CCC (chronic calculous cholecystitis) K80.10    Pilar cyst L72.11    Former smoker Z87.891    Morbid obesity with BMI of 40.0-44.9, adult ([facility] E66.01, Z68.41       Past Medical History:        Diagnosis Date    Anxiety     Depression     Gastric ulcer     Taking Protonix    OCD (obsessive compulsive disorder)     Polycystic ovarian syndrome        Past Surgical History:        Procedure Laterality Date     SECTION  2010, 2016, 11/2016    x3    CHOLECYSTECTOMY, LAPAROSCOPIC  2016    OVARIAN CYST REMOVAL  2014    TONSILLECTOMY  age 10    T & A     TUBAL LIGATION  2016    UPPER GASTROINTESTINAL ENDOSCOPY N/A 2021    EGD [facility]OPSY performed by Bhavya Barry MD at [facility] END[facility]COPY       Social History:    Social History     Tobacco Use    Smoking status: Former Smoker     Years: 9.00     Quit date: 2020     Years since quittin.6    Smokeless tobacco: Never Used   Substance Use Topics    Alcohol use: Never                                Counseling given: Not Answered      Vital Signs (Current): There were no vitals filed for this visit. BP Readings from Last 3 Encounters:   08/03/21 (!) 136/90   06/30/21 (!) 140/96   06/01/21 135/79       NPO Status:                                                                                 BMI:   Wt Readings from Last 3 Encounters:   08/03/21 235 lb 6.4 oz (106.8 kg)   07/27/21 244 lb 6.4 oz (110.9 kg)   06/30/21 244 lb 12.8 oz (111 kg)     There is no height or weight on file to calculate BMI.    CBC:   Lab Results   Component Value Date    WBC 11.4 07/27/2021    RBC 4.69 07/27/2021    HGB 13.7 07/27/2021    HCT 41.3 07/27/2021    MCV 88.1 07/27/2021    RDW 12.2 07/27/2021     07/27/2021       CMP:   Lab Results   Component Value Date     07/27/2021    K 4.3 07/27/2021     07/27/2021    CO2 26 07/27/2021    BUN 21 07/27/2021    CREATININE 0.8 07/27/2021    GFRAA >60 07/27/2021    AGRATIO 1.7 04/03/2019    LABGLOM >60 07/27/2021    GLUCOSE 77 07/27/2021    PROT 7.7 07/27/2021    CALCIUM 9.9 07/27/2021    BILITOT 0.4 07/27/2021    ALKPHOS 85 07/27/2021    AST 19 07/27/2021    ALT 24 07/27/2021       POC Tests: No results for input(s): POCGLU, POCNA, POCK, POCCL, POCBUN, POCHEMO, POCHCT in the last 72 hours.     Coags: No results found for: PROTIME, INR, APTT    HCG (If Applicable):   Lab Results   Component Value Date    PREGTESTUR NEGATIVE 06/01/2021        ABGs: No results found for: PHART, PO2ART, MTX4EXS, UAR3OGU, BEART, W2PSJINN     Type & Screen (If Applicable):  No results found for: LABABO, LABRH    Drug/Infectious Status (If Applicable):  Lab Results   Component Value Date    HEPCAB 0.18 06/30/2015       COVID-19 Screening (If Applicable):   Lab Results   Component Value Date    COVID19 NOT DETECTED 08/03/2021           Anesthesia Evaluation    Airway:         Dental:          Pulmonary:                              Cardiovascular:             Beta Blocker:  Not on Beta Blocker Neuro/Psych:   (+) psychiatric history:depression/anxiety             GI/Hepatic/Renal:   (+) PUD, morbid obesity          Endo/Other:                     Abdominal:   (+) obese,           Vascular: Other Findings:             Anesthesia Plan      general     ASA 3       Induction: intravenous. Pre Anesthesia Assessment complete.  Chart reviewed on 8/6/2021        PAULO Peraza - STACY   8/6/2021

## 2021-08-06 NOTE — PROGRESS NOTES
Outpatient Nutrition Counseling    REASON FOR VISIT: Weigh-In    Chief Complaint:    Chief Complaint   Patient presents with    Weight Management       SUBJECTIVE:  Pt here for weigh-in visit prior to planned sleeve gastrectomy on . Pt lost 12 lbs on pre-op liver shrinking diet. Provided pre-surg drink. Reviewed post-op [place] diet for home. Pt verbalized understanding. The patient is a 28 y.o. female being seen for morbid obesity, considering weight loss surgery; Damaris's, Height: 5' 4.5\" (163.8 cm), Weight: 232 lb 6.4 oz (105.4 kg), Current Body mass index is 39.28 kg/m². The patient's PCP is PAULO Howard CNP     Comorbid Conditions:  Significant diseases affecting this patient are   Past Medical History:   Diagnosis Date    Anxiety     Depression     Gastric ulcer     Taking Protonix    OCD (obsessive compulsive disorder)     Polycystic ovarian syndrome    . Review of Systems - Review of Systems  Otherwise per HPI.     Allergies:  No Known Allergies    Past Surgical History:  Past Surgical History:   Procedure Laterality Date     SECTION  2010, 2016, 11/2016    x3    CHOLECYSTECTOMY, LAPAROSCOPIC  2016    OVARIAN CYST REMOVAL  2014    TONSILLECTOMY  age 10    T & A     TUBAL LIGATION  2016    UPPER GASTROINTESTINAL ENDOSCOPY N/A 2021    EGD BIOPSY performed by Lc Iverson MD at [facility] ENDOSCOPY       Family History:  Family History   Problem Relation Age of Onset    High Blood Pressure Mother     No Known Problems Father     No Known Problems Maternal Grandmother     No Known Problems Maternal Grandfather     Cancer Paternal Grandmother     No Known Problems Paternal Grandfather        Social History:  Social History     Socioeconomic History    Marital status:      Spouse name: Not on file    Number of children: Not on file    Years of education: Not on file    Highest education level: Not on file   Occupational History    Not on file Tobacco Use    Smoking status: Former Smoker     Years: 9.00     Quit date: 2020     Years since quittin.6    Smokeless tobacco: Never Used   Vaping Use    Vaping Use: Never used   Substance and Sexual Activity    Alcohol use: Never    Drug use: No    Sexual activity: Yes     Partners: Male   Other Topics Concern    Not on file   Social History Narrative    Not on file     Social Determinants of Health     Financial Resource Strain:     Difficulty of Paying Living Expenses:    Food Insecurity:     Worried About Running Out of Food in the Last Year:     [address] in the Last Year:    Transportation Needs:     Lack of Transportation (Medical):      Lack of Transportation (Non-Medical):    Physical Activity:     Days of Exercise per Week:     Minutes of Exercise per Session:    Stress:     Feeling of Stress :    Social Connections:     Frequency of Communication with Friends and Family:     Frequency of Social Gatherings with Friends and Family:     Attends Jew Services:     Active Member of Clubs or Organizations:     Attends Club or Organization Meetings:     Marital Status:    Intimate Partner Violence:     Fear of Current or Ex-Partner:     Emotionally Abused:     Physically Abused:     Sexually Abused:          OBJECTIVE:  Physical Exam   Ht 5' 4.5\" (1.638 m)   Wt 232 lb 6.4 oz (105.4 kg)   BMI 39.28 kg/m²        NUTRITION DIAGNOSIS: Overweight / Obesity   Problem: Increased adiposity compared to reference standard or established norms   Etiology: Excess intake compared to output over time   S/S: Ht: 64.5\" Wt: 232.4 lbs BMI: 39.28    NUTRITION INTERVENTIONS:    Individualized treatment goals to address nutritiondiagnosis:   Instructed on 600-800 kcal diet for weight loss post-op   Provided pre-surgery drink   Encouraged Physical activity as approved by physician    MONITORING/ EVALUATION/ PLAN:   Pt verbalized understanding of allmaterials covered   Pt asked pertinent questions throughout the session - expect compliance with nutrition guidelines presented   Provided pt with contact information should questions arise prior to next visit   Will f/u with pt post-op  LUANA Warner MS, RDN, LD  8/6/2021

## 2021-08-09 ENCOUNTER — HOSPITAL ENCOUNTER (INPATIENT)
Age: 31
LOS: 1 days | Discharge: HOME | DRG: 403 | End: 2021-08-10
Attending: SURGERY | Admitting: SURGERY
Payer: COMMERCIAL

## 2021-08-09 ENCOUNTER — ANESTHESIA (OUTPATIENT)
Dept: OPERATING ROOM | Age: 31
DRG: 403 | End: 2021-08-09
Payer: COMMERCIAL

## 2021-08-09 VITALS
RESPIRATION RATE: 3 BRPM | TEMPERATURE: 96 F | SYSTOLIC BLOOD PRESSURE: 168 MMHG | DIASTOLIC BLOOD PRESSURE: 109 MMHG | OXYGEN SATURATION: 95 %

## 2021-08-09 DIAGNOSIS — Z98.84 S/P LAPAROSCOPIC SLEEVE GASTRECTOMY: Primary | ICD-10-CM

## 2021-08-09 LAB — PREGNANCY TEST URINE, POC: NEGATIVE

## 2021-08-09 PROCEDURE — 88307 TISSUE EXAM BY PATHOLOGIST: CPT | Performed by: PATHOLOGY

## 2021-08-09 PROCEDURE — 2500000003 HC RX 250 WO HCPCS: Performed by: SURGERY

## 2021-08-09 PROCEDURE — C9290 INJ, BUPIVACAINE LIPOSOME: HCPCS | Performed by: SURGERY

## 2021-08-09 PROCEDURE — C1889 IMPLANT/INSERT DEVICE, NOC: HCPCS | Performed by: SURGERY

## 2021-08-09 PROCEDURE — 6360000002 HC RX W HCPCS: Performed by: SURGERY

## 2021-08-09 PROCEDURE — 3600000009 HC SURGERY ROBOT BASE: Performed by: SURGERY

## 2021-08-09 PROCEDURE — 81025 URINE PREGNANCY TEST: CPT

## 2021-08-09 PROCEDURE — 0DB64Z3 EXCISION OF STOMACH, PERCUTANEOUS ENDOSCOPIC APPROACH, VERTICAL: ICD-10-PCS | Performed by: SURGERY

## 2021-08-09 PROCEDURE — 3600000019 HC SURGERY ROBOT ADDTL 15MIN: Performed by: SURGERY

## 2021-08-09 PROCEDURE — 7100000000 HC PACU RECOVERY - FIRST 15 MIN: Performed by: SURGERY

## 2021-08-09 PROCEDURE — 6370000000 HC RX 637 (ALT 250 FOR IP): Performed by: SURGERY

## 2021-08-09 PROCEDURE — 7100000001 HC PACU RECOVERY - ADDTL 15 MIN: Performed by: SURGERY

## 2021-08-09 PROCEDURE — 6360000002 HC RX W HCPCS: Performed by: NURSE ANESTHETIST, CERTIFIED REGISTERED

## 2021-08-09 PROCEDURE — 43775 LAP SLEEVE GASTRECTOMY: CPT | Performed by: SURGERY

## 2021-08-09 PROCEDURE — 94150 VITAL CAPACITY TEST: CPT

## 2021-08-09 PROCEDURE — 2709999900 HC NON-CHARGEABLE SUPPLY: Performed by: SURGERY

## 2021-08-09 PROCEDURE — 3700000000 HC ANESTHESIA ATTENDED CARE: Performed by: SURGERY

## 2021-08-09 PROCEDURE — 2580000003 HC RX 258: Performed by: NURSE ANESTHETIST, CERTIFIED REGISTERED

## 2021-08-09 PROCEDURE — 3700000001 HC ADD 15 MINUTES (ANESTHESIA): Performed by: SURGERY

## 2021-08-09 PROCEDURE — 2500000003 HC RX 250 WO HCPCS: Performed by: NURSE ANESTHETIST, CERTIFIED REGISTERED

## 2021-08-09 PROCEDURE — C9113 INJ PANTOPRAZOLE SODIUM, VIA: HCPCS | Performed by: SURGERY

## 2021-08-09 PROCEDURE — 1200000000 HC SEMI PRIVATE

## 2021-08-09 PROCEDURE — 2720000010 HC SURG SUPPLY STERILE: Performed by: SURGERY

## 2021-08-09 PROCEDURE — 2580000003 HC RX 258: Performed by: SURGERY

## 2021-08-09 PROCEDURE — S2900 ROBOTIC SURGICAL SYSTEM: HCPCS | Performed by: SURGERY

## 2021-08-09 PROCEDURE — 6360000002 HC RX W HCPCS: Performed by: ANESTHESIOLOGY

## 2021-08-09 RX ORDER — HYDRALAZINE HYDROCHLORIDE 20 MG/ML
5 INJECTION INTRAMUSCULAR; INTRAVENOUS EVERY 10 MIN PRN
Status: DISCONTINUED | OUTPATIENT
Start: 2021-08-09 | End: 2021-08-09 | Stop reason: HOSPADM

## 2021-08-09 RX ORDER — CEFAZOLIN SODIUM 2 G/100ML
2000 INJECTION, SOLUTION INTRAVENOUS ONCE
Status: COMPLETED | OUTPATIENT
Start: 2021-08-09 | End: 2021-08-09

## 2021-08-09 RX ORDER — DIPHENHYDRAMINE HYDROCHLORIDE 50 MG/ML
12.5 INJECTION INTRAMUSCULAR; INTRAVENOUS
Status: DISCONTINUED | OUTPATIENT
Start: 2021-08-09 | End: 2021-08-09 | Stop reason: HOSPADM

## 2021-08-09 RX ORDER — SODIUM CHLORIDE, SODIUM LACTATE, POTASSIUM CHLORIDE, CALCIUM CHLORIDE 600; 310; 30; 20 MG/100ML; MG/100ML; MG/100ML; MG/100ML
INJECTION, SOLUTION INTRAVENOUS CONTINUOUS PRN
Status: DISCONTINUED | OUTPATIENT
Start: 2021-08-09 | End: 2021-08-09 | Stop reason: SDUPTHER

## 2021-08-09 RX ORDER — SODIUM CHLORIDE 9 MG/ML
25 INJECTION, SOLUTION INTRAVENOUS PRN
Status: DISCONTINUED | OUTPATIENT
Start: 2021-08-09 | End: 2021-08-10 | Stop reason: HOSPADM

## 2021-08-09 RX ORDER — ROCURONIUM BROMIDE 10 MG/ML
INJECTION, SOLUTION INTRAVENOUS PRN
Status: DISCONTINUED | OUTPATIENT
Start: 2021-08-09 | End: 2021-08-09 | Stop reason: SDUPTHER

## 2021-08-09 RX ORDER — CEFAZOLIN SODIUM 2 G/100ML
2000 INJECTION, SOLUTION INTRAVENOUS EVERY 8 HOURS
Status: COMPLETED | OUTPATIENT
Start: 2021-08-09 | End: 2021-08-10

## 2021-08-09 RX ORDER — HYDROCODONE BITARTRATE AND ACETAMINOPHEN 5; 325 MG/1; MG/1
2 TABLET ORAL EVERY 6 HOURS PRN
Status: DISCONTINUED | OUTPATIENT
Start: 2021-08-09 | End: 2021-08-09 | Stop reason: HOSPADM

## 2021-08-09 RX ORDER — PROPOFOL 10 MG/ML
INJECTION, EMULSION INTRAVENOUS PRN
Status: DISCONTINUED | OUTPATIENT
Start: 2021-08-09 | End: 2021-08-09 | Stop reason: SDUPTHER

## 2021-08-09 RX ORDER — FENTANYL CITRATE 50 UG/ML
INJECTION, SOLUTION INTRAMUSCULAR; INTRAVENOUS PRN
Status: DISCONTINUED | OUTPATIENT
Start: 2021-08-09 | End: 2021-08-09 | Stop reason: SDUPTHER

## 2021-08-09 RX ORDER — HEPARIN SODIUM 5000 [USP'U]/ML
5000 INJECTION, SOLUTION INTRAVENOUS; SUBCUTANEOUS ONCE
Status: COMPLETED | OUTPATIENT
Start: 2021-08-09 | End: 2021-08-09

## 2021-08-09 RX ORDER — SENNA AND DOCUSATE SODIUM 50; 8.6 MG/1; MG/1
2 TABLET, FILM COATED ORAL 2 TIMES DAILY
Status: DISCONTINUED | OUTPATIENT
Start: 2021-08-09 | End: 2021-08-10 | Stop reason: HOSPADM

## 2021-08-09 RX ORDER — MAGNESIUM SULFATE 1 G/100ML
1000 INJECTION INTRAVENOUS PRN
Status: DISCONTINUED | OUTPATIENT
Start: 2021-08-09 | End: 2021-08-10 | Stop reason: HOSPADM

## 2021-08-09 RX ORDER — SCOLOPAMINE TRANSDERMAL SYSTEM 1 MG/1
1 PATCH, EXTENDED RELEASE TRANSDERMAL
Status: DISCONTINUED | OUTPATIENT
Start: 2021-08-09 | End: 2021-08-10 | Stop reason: HOSPADM

## 2021-08-09 RX ORDER — DEXTROSE, SODIUM CHLORIDE, AND POTASSIUM CHLORIDE 5; .45; .15 G/100ML; G/100ML; G/100ML
INJECTION INTRAVENOUS CONTINUOUS
Status: DISCONTINUED | OUTPATIENT
Start: 2021-08-09 | End: 2021-08-10 | Stop reason: HOSPADM

## 2021-08-09 RX ORDER — HYDROMORPHONE HCL 110MG/55ML
0.5 PATIENT CONTROLLED ANALGESIA SYRINGE INTRAVENOUS EVERY 5 MIN PRN
Status: DISCONTINUED | OUTPATIENT
Start: 2021-08-09 | End: 2021-08-09 | Stop reason: HOSPADM

## 2021-08-09 RX ORDER — FENTANYL CITRATE 50 UG/ML
50 INJECTION, SOLUTION INTRAMUSCULAR; INTRAVENOUS EVERY 5 MIN PRN
Status: DISCONTINUED | OUTPATIENT
Start: 2021-08-09 | End: 2021-08-09 | Stop reason: HOSPADM

## 2021-08-09 RX ORDER — OXYCODONE HYDROCHLORIDE AND ACETAMINOPHEN 5; 325 MG/1; MG/1
2 TABLET ORAL EVERY 4 HOURS PRN
Status: DISCONTINUED | OUTPATIENT
Start: 2021-08-09 | End: 2021-08-10 | Stop reason: HOSPADM

## 2021-08-09 RX ORDER — LABETALOL HYDROCHLORIDE 5 MG/ML
5 INJECTION, SOLUTION INTRAVENOUS EVERY 10 MIN PRN
Status: DISCONTINUED | OUTPATIENT
Start: 2021-08-09 | End: 2021-08-09 | Stop reason: HOSPADM

## 2021-08-09 RX ORDER — ONDANSETRON 2 MG/ML
INJECTION INTRAMUSCULAR; INTRAVENOUS PRN
Status: DISCONTINUED | OUTPATIENT
Start: 2021-08-09 | End: 2021-08-09 | Stop reason: SDUPTHER

## 2021-08-09 RX ORDER — POTASSIUM CHLORIDE 7.45 MG/ML
10 INJECTION INTRAVENOUS PRN
Status: DISCONTINUED | OUTPATIENT
Start: 2021-08-09 | End: 2021-08-10 | Stop reason: HOSPADM

## 2021-08-09 RX ORDER — SODIUM CHLORIDE 0.9 % (FLUSH) 0.9 %
10 SYRINGE (ML) INJECTION EVERY 12 HOURS SCHEDULED
Status: DISCONTINUED | OUTPATIENT
Start: 2021-08-09 | End: 2021-08-10 | Stop reason: HOSPADM

## 2021-08-09 RX ORDER — MEPERIDINE HYDROCHLORIDE 25 MG/ML
12.5 INJECTION INTRAMUSCULAR; INTRAVENOUS; SUBCUTANEOUS EVERY 5 MIN PRN
Status: DISCONTINUED | OUTPATIENT
Start: 2021-08-09 | End: 2021-08-09 | Stop reason: HOSPADM

## 2021-08-09 RX ORDER — ONDANSETRON 2 MG/ML
4 INJECTION INTRAMUSCULAR; INTRAVENOUS EVERY 4 HOURS
Status: DISCONTINUED | OUTPATIENT
Start: 2021-08-09 | End: 2021-08-10 | Stop reason: HOSPADM

## 2021-08-09 RX ORDER — PROMETHAZINE HYDROCHLORIDE 25 MG/ML
6.25 INJECTION, SOLUTION INTRAMUSCULAR; INTRAVENOUS
Status: COMPLETED | OUTPATIENT
Start: 2021-08-09 | End: 2021-08-09

## 2021-08-09 RX ORDER — LIDOCAINE HYDROCHLORIDE 20 MG/ML
INJECTION, SOLUTION INTRAVENOUS PRN
Status: DISCONTINUED | OUTPATIENT
Start: 2021-08-09 | End: 2021-08-09 | Stop reason: SDUPTHER

## 2021-08-09 RX ORDER — SODIUM CHLORIDE, SODIUM LACTATE, POTASSIUM CHLORIDE, CALCIUM CHLORIDE 600; 310; 30; 20 MG/100ML; MG/100ML; MG/100ML; MG/100ML
INJECTION, SOLUTION INTRAVENOUS CONTINUOUS
Status: DISCONTINUED | OUTPATIENT
Start: 2021-08-09 | End: 2021-08-09

## 2021-08-09 RX ORDER — 0.9 % SODIUM CHLORIDE 0.9 %
500 INTRAVENOUS SOLUTION INTRAVENOUS
Status: DISCONTINUED | OUTPATIENT
Start: 2021-08-09 | End: 2021-08-09 | Stop reason: HOSPADM

## 2021-08-09 RX ORDER — PANTOPRAZOLE SODIUM 40 MG/10ML
40 INJECTION, POWDER, LYOPHILIZED, FOR SOLUTION INTRAVENOUS 2 TIMES DAILY
Status: DISCONTINUED | OUTPATIENT
Start: 2021-08-09 | End: 2021-08-10 | Stop reason: HOSPADM

## 2021-08-09 RX ORDER — ONDANSETRON 2 MG/ML
4 INJECTION INTRAMUSCULAR; INTRAVENOUS
Status: DISCONTINUED | OUTPATIENT
Start: 2021-08-09 | End: 2021-08-09 | Stop reason: HOSPADM

## 2021-08-09 RX ORDER — DEXAMETHASONE SODIUM PHOSPHATE 4 MG/ML
INJECTION, SOLUTION INTRA-ARTICULAR; INTRALESIONAL; INTRAMUSCULAR; INTRAVENOUS; SOFT TISSUE PRN
Status: DISCONTINUED | OUTPATIENT
Start: 2021-08-09 | End: 2021-08-09 | Stop reason: SDUPTHER

## 2021-08-09 RX ORDER — MIDAZOLAM HYDROCHLORIDE 1 MG/ML
INJECTION INTRAMUSCULAR; INTRAVENOUS PRN
Status: DISCONTINUED | OUTPATIENT
Start: 2021-08-09 | End: 2021-08-09 | Stop reason: SDUPTHER

## 2021-08-09 RX ORDER — HYDROCODONE BITARTRATE AND ACETAMINOPHEN 5; 325 MG/1; MG/1
1 TABLET ORAL PRN
Status: DISCONTINUED | OUTPATIENT
Start: 2021-08-09 | End: 2021-08-09 | Stop reason: HOSPADM

## 2021-08-09 RX ORDER — PROMETHAZINE HYDROCHLORIDE 25 MG/ML
12.5 INJECTION, SOLUTION INTRAMUSCULAR; INTRAVENOUS EVERY 6 HOURS PRN
Status: DISCONTINUED | OUTPATIENT
Start: 2021-08-09 | End: 2021-08-10 | Stop reason: HOSPADM

## 2021-08-09 RX ORDER — SODIUM CHLORIDE 0.9 % (FLUSH) 0.9 %
10 SYRINGE (ML) INJECTION PRN
Status: DISCONTINUED | OUTPATIENT
Start: 2021-08-09 | End: 2021-08-10 | Stop reason: HOSPADM

## 2021-08-09 RX ADMIN — FENTANYL CITRATE 50 MCG: 50 INJECTION, SOLUTION INTRAMUSCULAR; INTRAVENOUS at 11:19

## 2021-08-09 RX ADMIN — LIDOCAINE HYDROCHLORIDE 50 MG: 20 INJECTION, SOLUTION INTRAVENOUS at 12:39

## 2021-08-09 RX ADMIN — POTASSIUM CHLORIDE, DEXTROSE MONOHYDRATE AND SODIUM CHLORIDE: 150; 5; 450 INJECTION, SOLUTION INTRAVENOUS at 21:40

## 2021-08-09 RX ADMIN — FENTANYL CITRATE 100 MCG: 50 INJECTION, SOLUTION INTRAMUSCULAR; INTRAVENOUS at 10:50

## 2021-08-09 RX ADMIN — PROPOFOL 150 MG: 10 INJECTION, EMULSION INTRAVENOUS at 10:50

## 2021-08-09 RX ADMIN — SODIUM CHLORIDE, POTASSIUM CHLORIDE, SODIUM LACTATE AND CALCIUM CHLORIDE: 600; 310; 30; 20 INJECTION, SOLUTION INTRAVENOUS at 08:06

## 2021-08-09 RX ADMIN — DOCUSATE SODIUM 50 MG AND SENNOSIDES 8.6 MG 2 TABLET: 8.6; 5 TABLET, FILM COATED ORAL at 20:46

## 2021-08-09 RX ADMIN — ONDANSETRON 4 MG: 2 INJECTION INTRAMUSCULAR; INTRAVENOUS at 16:26

## 2021-08-09 RX ADMIN — CEFAZOLIN SODIUM 2000 MG: 2 INJECTION, SOLUTION INTRAVENOUS at 18:54

## 2021-08-09 RX ADMIN — ROCURONIUM BROMIDE 50 MG: 10 INJECTION INTRAVENOUS at 10:51

## 2021-08-09 RX ADMIN — DEXAMETHASONE SODIUM PHOSPHATE 8 MG: 4 INJECTION, SOLUTION INTRAMUSCULAR; INTRAVENOUS at 10:51

## 2021-08-09 RX ADMIN — LIDOCAINE HYDROCHLORIDE 50 MG: 20 INJECTION, SOLUTION INTRAVENOUS at 10:50

## 2021-08-09 RX ADMIN — FENTANYL CITRATE 50 MCG: 50 INJECTION, SOLUTION INTRAMUSCULAR; INTRAVENOUS at 11:42

## 2021-08-09 RX ADMIN — ONDANSETRON 4 MG: 2 INJECTION INTRAMUSCULAR; INTRAVENOUS at 20:46

## 2021-08-09 RX ADMIN — SODIUM CHLORIDE, POTASSIUM CHLORIDE, SODIUM LACTATE AND CALCIUM CHLORIDE: 600; 310; 30; 20 INJECTION, SOLUTION INTRAVENOUS at 10:40

## 2021-08-09 RX ADMIN — PANTOPRAZOLE SODIUM 40 MG: 40 INJECTION, POWDER, FOR SOLUTION INTRAVENOUS at 16:26

## 2021-08-09 RX ADMIN — ROCURONIUM BROMIDE 10 MG: 10 INJECTION INTRAVENOUS at 11:35

## 2021-08-09 RX ADMIN — HYDROMORPHONE HYDROCHLORIDE 0.5 MG: 2 INJECTION INTRAMUSCULAR; INTRAVENOUS; SUBCUTANEOUS at 13:15

## 2021-08-09 RX ADMIN — ONDANSETRON 4 MG: 2 INJECTION INTRAMUSCULAR; INTRAVENOUS at 12:39

## 2021-08-09 RX ADMIN — PROMETHAZINE HYDROCHLORIDE 6.25 MG: 25 INJECTION INTRAMUSCULAR; INTRAVENOUS at 13:17

## 2021-08-09 RX ADMIN — CEFAZOLIN SODIUM 2000 MG: 2 INJECTION, SOLUTION INTRAVENOUS at 10:55

## 2021-08-09 RX ADMIN — MIDAZOLAM 2 MG: 1 INJECTION INTRAMUSCULAR; INTRAVENOUS at 10:46

## 2021-08-09 RX ADMIN — HYDROMORPHONE HYDROCHLORIDE 1 MG: 1 INJECTION, SOLUTION INTRAMUSCULAR; INTRAVENOUS; SUBCUTANEOUS at 17:35

## 2021-08-09 RX ADMIN — POTASSIUM CHLORIDE, DEXTROSE MONOHYDRATE AND SODIUM CHLORIDE: 150; 5; 450 INJECTION, SOLUTION INTRAVENOUS at 13:13

## 2021-08-09 RX ADMIN — HEPARIN SODIUM 5000 UNITS: 5000 INJECTION INTRAVENOUS; SUBCUTANEOUS at 08:33

## 2021-08-09 RX ADMIN — SUGAMMADEX 200 MG: 100 INJECTION, SOLUTION INTRAVENOUS at 12:39

## 2021-08-09 RX ADMIN — HYDROMORPHONE HYDROCHLORIDE 0.5 MG: 2 INJECTION INTRAMUSCULAR; INTRAVENOUS; SUBCUTANEOUS at 13:10

## 2021-08-09 RX ADMIN — HYDROMORPHONE HYDROCHLORIDE 1 MG: 1 INJECTION, SOLUTION INTRAMUSCULAR; INTRAVENOUS; SUBCUTANEOUS at 21:01

## 2021-08-09 RX ADMIN — PANTOPRAZOLE SODIUM 40 MG: 40 INJECTION, POWDER, FOR SOLUTION INTRAVENOUS at 20:46

## 2021-08-09 ASSESSMENT — PULMONARY FUNCTION TESTS
PIF_VALUE: 27
PIF_VALUE: 2
PIF_VALUE: 2
PIF_VALUE: 28
PIF_VALUE: 25
PIF_VALUE: 22
PIF_VALUE: 26
PIF_VALUE: 21
PIF_VALUE: 28
PIF_VALUE: 27
PIF_VALUE: 28
PIF_VALUE: 11
PIF_VALUE: 3
PIF_VALUE: 0
PIF_VALUE: 3
PIF_VALUE: 23
PIF_VALUE: 24
PIF_VALUE: 26
PIF_VALUE: 28
PIF_VALUE: 27
PIF_VALUE: 28
PIF_VALUE: 26
PIF_VALUE: 25
PIF_VALUE: 27
PIF_VALUE: 5
PIF_VALUE: 14
PIF_VALUE: 28
PIF_VALUE: 23
PIF_VALUE: 2
PIF_VALUE: 28
PIF_VALUE: 26
PIF_VALUE: 28
PIF_VALUE: 28
PIF_VALUE: 29
PIF_VALUE: 2
PIF_VALUE: 28
PIF_VALUE: 27
PIF_VALUE: 0
PIF_VALUE: 24
PIF_VALUE: 28
PIF_VALUE: 11
PIF_VALUE: 23
PIF_VALUE: 29
PIF_VALUE: 25
PIF_VALUE: 27
PIF_VALUE: 28
PIF_VALUE: 27
PIF_VALUE: 28
PIF_VALUE: 1
PIF_VALUE: 27
PIF_VALUE: 28
PIF_VALUE: 27
PIF_VALUE: 28
PIF_VALUE: 23
PIF_VALUE: 28
PIF_VALUE: 28
PIF_VALUE: 27
PIF_VALUE: 28
PIF_VALUE: 3
PIF_VALUE: 28
PIF_VALUE: 27
PIF_VALUE: 28
PIF_VALUE: 27
PIF_VALUE: 14
PIF_VALUE: 0
PIF_VALUE: 6
PIF_VALUE: 28
PIF_VALUE: 28
PIF_VALUE: 22
PIF_VALUE: 22
PIF_VALUE: 23
PIF_VALUE: 29
PIF_VALUE: 27
PIF_VALUE: 28
PIF_VALUE: 28
PIF_VALUE: 22
PIF_VALUE: 29
PIF_VALUE: 11
PIF_VALUE: 28
PIF_VALUE: 27
PIF_VALUE: 26
PIF_VALUE: 23
PIF_VALUE: 27
PIF_VALUE: 27
PIF_VALUE: 0
PIF_VALUE: 28
PIF_VALUE: 21
PIF_VALUE: 28
PIF_VALUE: 26
PIF_VALUE: 27
PIF_VALUE: 2
PIF_VALUE: 28
PIF_VALUE: 23
PIF_VALUE: 3
PIF_VALUE: 28
PIF_VALUE: 23
PIF_VALUE: 28
PIF_VALUE: 24
PIF_VALUE: 22
PIF_VALUE: 28
PIF_VALUE: 28
PIF_VALUE: 23
PIF_VALUE: 22
PIF_VALUE: 28
PIF_VALUE: 2
PIF_VALUE: 28
PIF_VALUE: 24
PIF_VALUE: 1
PIF_VALUE: 28
PIF_VALUE: 0
PIF_VALUE: 28
PIF_VALUE: 14
PIF_VALUE: 28
PIF_VALUE: 27
PIF_VALUE: 28
PIF_VALUE: 23
PIF_VALUE: 27
PIF_VALUE: 28
PIF_VALUE: 23
PIF_VALUE: 23
PIF_VALUE: 28
PIF_VALUE: 29
PIF_VALUE: 28
PIF_VALUE: 26

## 2021-08-09 ASSESSMENT — PAIN SCALES - GENERAL
PAINLEVEL_OUTOF10: 8
PAINLEVEL_OUTOF10: 9
PAINLEVEL_OUTOF10: 7
PAINLEVEL_OUTOF10: 9

## 2021-08-09 ASSESSMENT — PAIN DESCRIPTION - FREQUENCY
FREQUENCY: CONTINUOUS
FREQUENCY: CONTINUOUS

## 2021-08-09 ASSESSMENT — PAIN DESCRIPTION - PROGRESSION: CLINICAL_PROGRESSION: GRADUALLY IMPROVING

## 2021-08-09 ASSESSMENT — PAIN DESCRIPTION - PAIN TYPE
TYPE: SURGICAL PAIN
TYPE: SURGICAL PAIN

## 2021-08-09 ASSESSMENT — PAIN DESCRIPTION - LOCATION
LOCATION: ABDOMEN
LOCATION: ABDOMEN

## 2021-08-09 ASSESSMENT — PAIN DESCRIPTION - DESCRIPTORS
DESCRIPTORS: ACHING;DISCOMFORT
DESCRIPTORS: ACHING;DISCOMFORT

## 2021-08-09 ASSESSMENT — PAIN - FUNCTIONAL ASSESSMENT: PAIN_FUNCTIONAL_ASSESSMENT: 0-10

## 2021-08-09 NOTE — OP NOTE
OPERATIVE / PROCEDURE NOTE    Randa Jeffery, 1990, 28 y.o.,  female, CSN: [id number]  August 9, 2021       PREOPERATIVE DIAGNOSES:    Morbid Obesity - Body mass index is 39.31 kg/m².  +  Past Medical History:   Diagnosis Date    Anxiety     Depression     Gastric ulcer     Taking Protonix    OCD (obsessive compulsive disorder)     Polycystic ovarian syndrome         POSTOPERATIVE DIAGNOSES:   The same. PPROCEDURE: Laparoscopic robotic DaVinci-assisted sleeve gastrectomy around a  38-Georgian bougie. Surgeon: Trisha Nixon MD, FACS, [address]. Assistant: Bairon Torrez APRN- CNP  The use of a first assistant was necessary for the proper positioning, prepping, and draping of the patient, intraoperative retraction, passing sutures and implants(like mesh), stapling bowel and vessels using  devises when necessary, and suction using laparoscopic instruments, exchanging DaVinci robotic instruments, passing sutures and closure of skin and subcutaneous tissues. ANESTHESIA: General endotracheal anesthesia as well as local to the wounds  preoperatively using 1% Xylocaine with epinephrine and 20 ml of Exparel + 40 ml of Saline  post operatively. ESTIMATED BLOOD LOSS: Less than 10 mL. SPECIMEN: Partial gastrectomy. DRAINS: None. COMPLICATIONS: None. CONDITION: Stable and extubated to the PACU.       OPERATIVE DESCRIPTION: After proper informed consent was signed by the  patient knowing all the risks, benefits, potential complications, and  possible alternatives of the procedure after she underwent very extensive  preoperative workup and evaluation and optimization after having seen the  dietician and followed with a [id number]-[id number] restriction diet, the physical  therapist, and complied adequately with 3 times a week PT  exercises in addition to her routine daily activities and has been seen by  the psychologist, and after she underwent cardiac  clearance and pulmonology clearance and after she  adequately complied with dietary and lifestyle modification changes, the  patient was well prepped and committed to her lifestyle modifying and changing bariatric procedure in an  attempt to improve and/or eliminate some of her comorbidities. Preoperatively she was placed on a  liquid high-protein, low-carbohydrate Slim-Fast diet for the  last 2 weeks, and successfully lost 12 Lbs. In the holding area, she   received 3 mg of Ancef IV. TEDs  and SCDs were placed on her legs and activated bilaterally. Hibiclens skin  prep to the abdomen was performed. Then she was taken to the operating room and  was placed supine on the operating room table after institution of general  endotracheal anesthesia. The above-mentioned anesthetic 1% Xylocaine with epinephrine  was used to anesthetize all the incisions:  at the left lateral rectus level a 12-mm incision was performed. The Visiport was used to enter  the abdomen under direct visualization. Pneumoperitoneum was instituted with  C02 insufflation up to 15 mmHg pressure. The patient was then placed in steep reverse Trendelenburg   position, and the left subxiphoid 4-mm incision  was performed through which the [facility] liver retractor was placed. The  left lobe of the liver was retracted superiorly / cephalad and to the right  of the patient to expose the hiatus and hiatal hernia adequately. The  [place] table-mounted arm was stabilized. All of  the ports were placed, an 8-mm left subcostal port in the left anterior  axillary line, mirrored image right subcostal port in the right anterior  axillary line, and a 12-mm right midclavicular port for the Ethicon stapler; all on a semilunar line. After all the  ports were placed in, the VoÃ¶lksi robot was docked in after exploration   of the abdomen revealed no  contraindication to proceed with the planned procedures.    The pylorus was identified,   and 3-4 cm proximal to the pylorus, the gastrocolic omentum was taken down meticulously  using the robotic vessel sealer. Part of the right gastroepiploic vessels, the  left gastroepiploic and the short gastric vessels were taken down  meticulously using the DaVinci vessel sealer all the way to the angle of Hiss which  was done uneventfully without any bleeding encountered throughout the whole  procedure. Some posterior gastro-pancreatic attachments were identified and  divided sharply uneventfully, and then the OG tube placed to suction at the beginning  of the procedure until this point of the surgery was then removed  uneventfully and a 38-Ugandan bougie was advanced per Anesthesia and guided  robotically all the way until it crossed the pylorus. The Ethicon Endo-SARANYA 60-mm stapler  was then used to create the sleeve gastrectomy, by triple stapling on each side and dividing the stomach  starting 4 cm proximal to the pylorus, cephalad; starting with 1 green  loads followed by 5 blue staplers for a total of 6  firings all the way to the angle of Hiss around the 38-Ugandan bougie  uneventfully in a very even manner anteriorly and posteriorly, without any bleeding encountered  throughout the whole procedure, without any staple misfires, totally uneventfully. The staple line was then inspected  meticulously and no abnormality was noted. No bleeding noted either. The  staple line was then sprayed with Tisseel -tissue sealant for an extra layer of  Protection against bleeding & / or leaks. The excluded stomach was then delivered through the left  supraumbilical port wound and was palpated for any gross abnormalities, and none  were noted. It was sent to permanent pathology. The Adama-Gala fascia  closure device was then used to reapproximate the 12-mm ports and the 10-mm port with 0  Vicryl. All ports were removed under direct visualization without any  evidence of port site bleeding, including the Katie liver retractor after the  pneumoperitoneum was evacuated. Further more all port sites were re-injected with the above mentioned local anesthetic mixture. The skin of all wounds were approximated using 4-0  Vicryl in a running subcuticular fashion followed by 2 layers of Dermabond skin glue. The patient tolerated both procedures very well without any complications, was extubated in the OR, and was  sent to the PACU in stable condition.       ________________________________________________    Keke Brewer MD, FACS, FICS  8/9/2021  12:37 PM

## 2021-08-09 NOTE — ANESTHESIA PRE PROCEDURE
Department of Anesthesiology  Preprocedure Note       Name:  Justin Scales   Age:  28 y.o.  :  1990                                          MRN:  [id number]         Date:  2021      Surgeon: Moustapha Henaoor):  Criselda Callejas MD    Procedure: Procedure(s):  GASTRECTOMY SLEEVE LAPAROSCOPIC ROBOTIC    Medications prior to admission:   Prior to Admission medications    Medication Sig Start Date End Date Taking? Authorizing Provider   pantoprazole (PROTONIX) 40 MG tablet Take 1 tablet by mouth 2 times daily 21   Criselda Callejas MD       Current medications:    No current facility-administered medications for this encounter. Allergies:  No Known Allergies    Problem List:    Patient Active Problem List   Diagnosis Code    CCC (chronic calculous cholecystitis) K80.10    Pilar cyst L72.11    Former smoker Z87.891    Morbid obesity with BMI of 40.0-44.9, adult ([facility] E66.01, Z68.41       Past Medical History:        Diagnosis Date    Anxiety     Depression     Gastric ulcer     Taking Protonix    OCD (obsessive compulsive disorder)     Polycystic ovarian syndrome        Past Surgical History:        Procedure Laterality Date     SECTION  2010, 2016, 11/2016    x3    CHOLECYSTECTOMY, LAPAROSCOPIC  2016    OVARIAN CYST REMOVAL  2014    TONSILLECTOMY  age 10    T & A     TUBAL LIGATION  2016    UPPER GASTROINTESTINAL ENDOSCOPY N/A 2021    EGD BIOPSY performed by Criselda Callejas MD at [facility] ENDOSCOPY       Social History:    Social History     Tobacco Use    Smoking status: Former Smoker     Years: 9.00     Quit date: 2020     Years since quittin.6    Smokeless tobacco: Never Used   Substance Use Topics    Alcohol use: Never                                Counseling given: Not Answered      Vital Signs (Current): There were no vitals filed for this visit.                                            BP Readings from Last 3 Encounters:   21 (!) 136/90   06/30/21 (!) 140/96   06/01/21 135/79       NPO Status:                                                                                 BMI:   Wt Readings from Last 3 Encounters:   08/06/21 232 lb 6.4 oz (105.4 kg)   08/03/21 235 lb 6.4 oz (106.8 kg)   07/27/21 244 lb 6.4 oz (110.9 kg)     There is no height or weight on file to calculate BMI.    CBC:   Lab Results   Component Value Date    WBC 11.4 07/27/2021    RBC 4.69 07/27/2021    HGB 13.7 07/27/2021    HCT 41.3 07/27/2021    MCV 88.1 07/27/2021    RDW 12.2 07/27/2021     07/27/2021       CMP:   Lab Results   Component Value Date     07/27/2021    K 4.3 07/27/2021     07/27/2021    CO2 26 07/27/2021    BUN 21 07/27/2021    CREATININE 0.8 07/27/2021    GFRAA >60 07/27/2021    AGRATIO 1.7 04/03/2019    LABGLOM >60 07/27/2021    GLUCOSE 77 07/27/2021    PROT 7.7 07/27/2021    CALCIUM 9.9 07/27/2021    BILITOT 0.4 07/27/2021    ALKPHOS 85 07/27/2021    AST 19 07/27/2021    ALT 24 07/27/2021       POC Tests: No results for input(s): POCGLU, POCNA, POCK, POCCL, POCBUN, POCHEMO, POCHCT in the last 72 hours. Coags: No results found for: PROTIME, INR, APTT    HCG (If Applicable):   Lab Results   Component Value Date    PREGTESTUR NEGATIVE 06/01/2021        ABGs: No results found for: PHART, PO2ART, MXU6NWL, FKJ4FAD, BEART, Q8IXZOVH     Type & Screen (If Applicable):  No results found for: LABABO, LABRH    Drug/Infectious Status (If Applicable):  Lab Results   Component Value Date    HEPCAB 0.18 06/30/2015       COVID-19 Screening (If Applicable):   Lab Results   Component Value Date    COVID19 NOT DETECTED 08/03/2021           Anesthesia Evaluation  Patient summary reviewed  Airway: Mallampati: III  TM distance: <3 FB   Neck ROM: full  Mouth opening: > = 3 FB Dental:          Pulmonary:             Patient did not smoke on day of surgery.                  Cardiovascular:  Exercise tolerance: good (>4 METS),         ECG reviewed      Echocardiogram reviewed    Cleared by cardiology     Beta Blocker:  Not on Beta Blocker      ROS comment:  Summary   Left ventricular systolic function is normal with an ejection fraction of   55-60%. The left atrium is mildly dilated. No significant valvular disease or diastolic dysfunction noted. Normal pulmonary artery pressure with a RVSP of 28 mmHg. No evidence of pericardial effusion. Signature      ------------------------------------------------------------------   Electronically signed by Edmar Arana MD   (Interpreting physician) on 04/14/2021 at 05:49 PM    Sinus  Rhythm   -  Nonspecific T-abnormality  -Nondiagnostic for age. ABNORMAL     The patient was evaluated from a cardiac standpoint for his/her surgery and based on the patient's history and  test results, the patient is considered a low risk candidate for any landon-operative cardiac complications.        Please call with any further questions.     Respectfully,/ 4/2021     Neuro/Psych:   Negative Neuro/Psych ROS              GI/Hepatic/Renal:   (+) GERD: well controlled, PUD, morbid obesity          Endo/Other:    (+) blood dyscrasia::., .          Pt had PAT visit. ROS comment: Runs elevated wbc count  Abdominal:   (+) obese,           Vascular: negative vascular ROS. Other Findings:           Anesthesia Plan      general     ASA 3     (Covid - 8/8/21  )  Induction: intravenous. MIPS: Postoperative opioids intended and Prophylactic antiemetics administered. Anesthetic plan and risks discussed with patient. Use of blood products discussed with patient whom consented to blood products. Plan discussed with CRNA and attending. Attending anesthesiologist reviewed and agrees with Preprocedure content     Pre Anesthesia Assessment complete.  Chart reviewed on 8/9/2021        PAULO Mena - CRNA   8/9/2021

## 2021-08-09 NOTE — ANESTHESIA POSTPROCEDURE EVALUATION
Department of Anesthesiology  Postprocedure Note    Patient: Anibal uFller  MRN: [id number]  YOB: 1990  Date of evaluation: 8/9/2021  Time:  5:10 PM     Procedure Summary     Date: 08/09/21 Room / Location: [address] [place] [address]    Anesthesia Start: 5485 Anesthesia Stop: 7430    Procedure: GASTRECTOMY SLEEVE LAPAROSCOPIC ROBOTIC (N/A Abdomen) Diagnosis: (MORBID OBESITY)    Surgeons: Marivel Shields MD Responsible Provider: Bryanna Blunt MD    Anesthesia Type: general ASA Status: 3          Anesthesia Type: general    Shannon Phase I: Shannon Score: 10    Shannon Phase II:      Last vitals: Reviewed and per EMR flowsheets.        Anesthesia Post Evaluation    Patient location during evaluation: PACU  Patient participation: complete - patient participated  Level of consciousness: awake  Pain score: 3  Airway patency: patent  Nausea & Vomiting: no vomiting and no nausea  Complications: no  Cardiovascular status: blood pressure returned to baseline and hemodynamically stable  Respiratory status: acceptable  Hydration status: stable

## 2021-08-09 NOTE — PROGRESS NOTES
1300 - transferred from OR on bed, monitor applied, alarms on and verified, bedside handoff provided by [employer] and [facility] LAISHA CRNA  0680 - medicated for complaint of surgical pain/discomfort  1317 - medicated for complaint of nausea  1330 - resting calmly, eyes closed, respirations regular and even, no symptoms of pain or distress  1341 -  updated via telephone  [phone] - report called to Arsenio Clark  1350 - phase one care complete

## 2021-08-09 NOTE — H&P
HISTORY AND PHYSICAL EXAM    Date of Admission:  2021    PCP:  PAULO Calderon - CNP    Chief Complaint / History of Present Illness:  Asmita Busch is a very pleasant 28 y.o. female who presents today for her bariatric procedure. As noted her Body mass index is 39.31 kg/m². with significant co-morbidities as below. The patient has been complying with the pre-operative workup, lifestyle modifications and changes with the bariatric clinic, including:  Dietitian evaluation and counseling, psychologist evaluation and counseling, Exercise physiologist evaluation and counseling, cardiac preoperative clearance and optimization, pulmonology preoperative clearance and optimization, her preoperative EGD for GERD, revealed no Hiatal Hernia. she complied with the protein liquid diet for 2 weeks and successfully lost 12 lbs on pre-op liver shrinking diet. 18 Lbs total; will proceed today with robotic laparoscopic sleeve gastrectomy. All risks and benefits, potential complications and possible alternatives discussed, and agreeable to proceed. PMH:   has a past medical history of Anxiety, Depression, Gastric ulcer, OCD (obsessive compulsive disorder), and Polycystic ovarian syndrome. PSH:   has a past surgical history that includes ovarian cyst removal (2014); Tonsillectomy (age 10); Cholecystectomy, laparoscopic (2016);  section (2010, 2016, 2016); Tubal ligation (2016); and Upper gastrointestinal endoscopy (N/A, 2021). Allergies:  No Known Allergies     Home Meds:    Prior to Admission medications    Medication Sig Start Date End Date Taking?  Authorizing Provider   pantoprazole (PROTONIX) 40 MG tablet Take 1 tablet by mouth 2 times daily 21  Yes Jared Ma MD        [facility] Meds:    Current Facility-Administered Medications   Medication Dose Route Frequency Provider Last Rate Last Admin    ceFAZolin (ANCEF) 2000 mg in dextrose 4 % 100 mL IVPB (premix) 2,000 mg Intravenous Once Jared Ma MD        chlorhexidine gluconate (ANTISEPTIC SKIN CLEANSER) 4 % solution   Topical Once Jared Ma MD        lactated ringers infusion   Intravenous Continuous Jared Ma  mL/hr at 21 0806 New Bag at 21 0806       Social History / Family History:        Social History     Socioeconomic History    Marital status:      Spouse name: None    Number of children: None    Years of education: None    Highest education level: None   Occupational History    None   Tobacco Use    Smoking status: Former Smoker     Years: 9.00     Quit date: 2020     Years since quittin.6    Smokeless tobacco: Never Used   Vaping Use    Vaping Use: Never used   Substance and Sexual Activity    Alcohol use: Never    Drug use: No    Sexual activity: Yes     Partners: Male   Other Topics Concern    None   Social History Narrative    None     Social Determinants of Health     Financial Resource Strain:     Difficulty of Paying Living Expenses:    Food Insecurity:     Worried About Running Out of Food in the Last Year:     Ran Out of Food in the Last Year:    Transportation Needs:     Lack of Transportation (Medical):  Lack of Transportation (Non-Medical):    Physical Activity:     Days of Exercise per Week:     Minutes of Exercise per Session:    Stress:     Feeling of Stress :    Social Connections:     Frequency of Communication with Friends and Family:     Frequency of Social Gatherings with Friends and Family:     Attends Jew Services:     Active Member of Clubs or Organizations:     Attends Club or Organization Meetings:     Marital Status:    Intimate Partner Violence:     Fear of Current or Ex-Partner:     Emotionally Abused:     Physically Abused:     Sexually Abused:        Review of Systems:  Constitutional: Negative for fever, chills, diaphoresis, appetite change and fatigue.    HENT: Negative for sore index is 39.31 kg/m². with significant co-morbidities as below. The patient has been complying with the pre-operative workup, lifestyle modifications and changes with the bariatric clinic, including:  Dietitian evaluation and counseling, psychologist evaluation and counseling, Exercise physiologist evaluation and counseling, cardiac preoperative clearance and optimization, pulmonology preoperative clearance and optimization, her preoperative EGD for GERD, revealed no Hiatal Hernia. she complied with the protein liquid diet for 2 weeks and successfully lost  12 lbs on pre-op liver shrinking diet. 18 Lbs total; will proceed today with robotic laparoscopic sleeve gastrectomy. All risks and benefits, potential complications and possible alternatives discussed, and agreeable to proceed.     ____________________________________________    Anne Tavarez MD, FACS, FICS    8/9/2021  11:00 AM

## 2021-08-10 ENCOUNTER — APPOINTMENT (OUTPATIENT)
Dept: GENERAL RADIOLOGY | Age: 31
DRG: 403 | End: 2021-08-10
Attending: SURGERY
Payer: COMMERCIAL

## 2021-08-10 VITALS
RESPIRATION RATE: 16 BRPM | TEMPERATURE: 98.4 F | WEIGHT: 229 LBS | HEART RATE: 66 BPM | SYSTOLIC BLOOD PRESSURE: 131 MMHG | OXYGEN SATURATION: 94 % | DIASTOLIC BLOOD PRESSURE: 85 MMHG | BODY MASS INDEX: 39.09 KG/M2 | HEIGHT: 64 IN

## 2021-08-10 LAB
ANION GAP SERPL CALCULATED.3IONS-SCNC: 9 MMOL/L (ref 4–16)
BASOPHILS ABSOLUTE: 0 K/CU MM
BASOPHILS RELATIVE PERCENT: 0.2 % (ref 0–1)
BUN BLDV-MCNC: 7 MG/DL (ref 6–23)
CALCIUM SERPL-MCNC: 9 MG/DL (ref 8.3–10.6)
CHLORIDE BLD-SCNC: 102 MMOL/L (ref 99–110)
CO2: 25 MMOL/L (ref 21–32)
CREAT SERPL-MCNC: 0.6 MG/DL (ref 0.6–1.1)
DIFFERENTIAL TYPE: ABNORMAL
EOSINOPHILS ABSOLUTE: 0 K/CU MM
EOSINOPHILS RELATIVE PERCENT: 0.1 % (ref 0–3)
GFR AFRICAN AMERICAN: >60 ML/MIN/1.73M2
GFR NON-AFRICAN AMERICAN: >60 ML/MIN/1.73M2
GLUCOSE BLD-MCNC: 128 MG/DL (ref 70–99)
HCT VFR BLD CALC: 35.1 % (ref 37–47)
HEMOGLOBIN: 11.8 GM/DL (ref 12.5–16)
IMMATURE NEUTROPHIL %: 0.4 % (ref 0–0.43)
LYMPHOCYTES ABSOLUTE: 2.2 K/CU MM
LYMPHOCYTES RELATIVE PERCENT: 15.5 % (ref 24–44)
MCH RBC QN AUTO: 30 PG (ref 27–31)
MCHC RBC AUTO-ENTMCNC: 33.6 % (ref 32–36)
MCV RBC AUTO: 89.3 FL (ref 78–100)
MONOCYTES ABSOLUTE: 1.3 K/CU MM
MONOCYTES RELATIVE PERCENT: 9.4 % (ref 0–4)
NUCLEATED RBC %: 0 %
PDW BLD-RTO: 12.3 % (ref 11.7–14.9)
PLATELET # BLD: 390 K/CU MM (ref 140–440)
PMV BLD AUTO: 9.9 FL (ref 7.5–11.1)
POTASSIUM SERPL-SCNC: 3.9 MMOL/L (ref 3.5–5.1)
RBC # BLD: 3.93 M/CU MM (ref 4.2–5.4)
SEGMENTED NEUTROPHILS ABSOLUTE COUNT: 10.6 K/CU MM
SEGMENTED NEUTROPHILS RELATIVE PERCENT: 74.4 % (ref 36–66)
SODIUM BLD-SCNC: 136 MMOL/L (ref 135–145)
TOTAL IMMATURE NEUTOROPHIL: 0.05 K/CU MM
TOTAL NUCLEATED RBC: 0 K/CU MM
WBC # BLD: 14.2 K/CU MM (ref 4–10.5)

## 2021-08-10 PROCEDURE — 74240 X-RAY XM UPR GI TRC 1CNTRST: CPT

## 2021-08-10 PROCEDURE — 6360000004 HC RX CONTRAST MEDICATION: Performed by: SURGERY

## 2021-08-10 PROCEDURE — 99024 POSTOP FOLLOW-UP VISIT: CPT | Performed by: SURGERY

## 2021-08-10 PROCEDURE — 2580000003 HC RX 258: Performed by: SURGERY

## 2021-08-10 PROCEDURE — 43775 LAP SLEEVE GASTRECTOMY: CPT | Performed by: NURSE PRACTITIONER

## 2021-08-10 PROCEDURE — 2500000003 HC RX 250 WO HCPCS: Performed by: SURGERY

## 2021-08-10 PROCEDURE — APPNB180 APP NON BILLABLE TIME > 60 MINS: Performed by: NURSE PRACTITIONER

## 2021-08-10 PROCEDURE — 94150 VITAL CAPACITY TEST: CPT

## 2021-08-10 PROCEDURE — 85025 COMPLETE CBC W/AUTO DIFF WBC: CPT

## 2021-08-10 PROCEDURE — C9113 INJ PANTOPRAZOLE SODIUM, VIA: HCPCS | Performed by: SURGERY

## 2021-08-10 PROCEDURE — 6370000000 HC RX 637 (ALT 250 FOR IP): Performed by: SURGERY

## 2021-08-10 PROCEDURE — 6360000002 HC RX W HCPCS: Performed by: SURGERY

## 2021-08-10 PROCEDURE — 80048 BASIC METABOLIC PNL TOTAL CA: CPT

## 2021-08-10 PROCEDURE — 36415 COLL VENOUS BLD VENIPUNCTURE: CPT

## 2021-08-10 RX ORDER — AMOXICILLIN 250 MG
1 CAPSULE ORAL DAILY
Qty: 14 TABLET | Refills: 0 | Status: SHIPPED | OUTPATIENT
Start: 2021-08-10 | End: 2021-08-24

## 2021-08-10 RX ORDER — OXYCODONE HYDROCHLORIDE AND ACETAMINOPHEN 5; 325 MG/1; MG/1
1 TABLET ORAL EVERY 6 HOURS PRN
Qty: 20 TABLET | Refills: 0 | Status: SHIPPED | OUTPATIENT
Start: 2021-08-10 | End: 2021-08-15

## 2021-08-10 RX ORDER — PANTOPRAZOLE SODIUM 20 MG/1
20 TABLET, DELAYED RELEASE ORAL 2 TIMES DAILY
Qty: 60 TABLET | Refills: 3 | Status: SHIPPED | OUTPATIENT
Start: 2021-08-10 | End: 2021-09-01

## 2021-08-10 RX ORDER — ONDANSETRON 4 MG/1
4 TABLET, ORALLY DISINTEGRATING ORAL 3 TIMES DAILY PRN
Qty: 21 TABLET | Refills: 2 | Status: SHIPPED | OUTPATIENT
Start: 2021-08-10 | End: 2021-11-10

## 2021-08-10 RX ADMIN — DIATRIZOATE MEGLUMINE AND DIATRIZOATE SODIUM 30 ML: 600; 100 SOLUTION ORAL; RECTAL at 07:56

## 2021-08-10 RX ADMIN — CEFAZOLIN SODIUM 2000 MG: 2 INJECTION, SOLUTION INTRAVENOUS at 03:36

## 2021-08-10 RX ADMIN — POTASSIUM CHLORIDE, DEXTROSE MONOHYDRATE AND SODIUM CHLORIDE: 150; 5; 450 INJECTION, SOLUTION INTRAVENOUS at 05:56

## 2021-08-10 RX ADMIN — ONDANSETRON 4 MG: 2 INJECTION INTRAMUSCULAR; INTRAVENOUS at 01:02

## 2021-08-10 RX ADMIN — OXYCODONE HYDROCHLORIDE AND ACETAMINOPHEN 2 TABLET: 5; 325 TABLET ORAL at 10:36

## 2021-08-10 RX ADMIN — HYDROMORPHONE HYDROCHLORIDE 1 MG: 1 INJECTION, SOLUTION INTRAMUSCULAR; INTRAVENOUS; SUBCUTANEOUS at 05:56

## 2021-08-10 RX ADMIN — Medication 10 ML: at 10:37

## 2021-08-10 RX ADMIN — ONDANSETRON 4 MG: 2 INJECTION INTRAMUSCULAR; INTRAVENOUS at 05:54

## 2021-08-10 RX ADMIN — ONDANSETRON 4 MG: 2 INJECTION INTRAMUSCULAR; INTRAVENOUS at 10:36

## 2021-08-10 RX ADMIN — OXYCODONE HYDROCHLORIDE AND ACETAMINOPHEN 2 TABLET: 5; 325 TABLET ORAL at 16:36

## 2021-08-10 RX ADMIN — ENOXAPARIN SODIUM 40 MG: 40 INJECTION SUBCUTANEOUS at 01:02

## 2021-08-10 RX ADMIN — ONDANSETRON 4 MG: 2 INJECTION INTRAMUSCULAR; INTRAVENOUS at 16:32

## 2021-08-10 RX ADMIN — PANTOPRAZOLE SODIUM 40 MG: 40 INJECTION, POWDER, FOR SOLUTION INTRAVENOUS at 10:36

## 2021-08-10 RX ADMIN — DOCUSATE SODIUM 50 MG AND SENNOSIDES 8.6 MG 2 TABLET: 8.6; 5 TABLET, FILM COATED ORAL at 10:36

## 2021-08-10 ASSESSMENT — PAIN SCALES - GENERAL
PAINLEVEL_OUTOF10: 6
PAINLEVEL_OUTOF10: 7
PAINLEVEL_OUTOF10: 7

## 2021-08-10 NOTE — DISCHARGE SUMMARY
Discharge Summary     Patient ID:  Danny Cohn  [id number]  25 y.o.  1990    Admit date: 8/9/2021    Discharge date: 8/10/2021     Admitting Physician: Kandy Beaver MD     Admission Diagnoses: Morbid obesity with BMI of 40.0-44.9, adult ([facility] [E66.01, Z68.41]  Patient Active Problem List   Diagnosis    CCC (chronic calculous cholecystitis)    Pilar cyst    Former smoker    Morbid obesity with BMI of 40.0-44.9, adult ([facility]    S/P laparoscopic sleeve gastrectomy     Past Medical History:   Diagnosis Date    Anxiety     Depression     Gastric ulcer     Taking Protonix    OCD (obsessive compulsive disorder)     Polycystic ovarian syndrome        Discharge Diagnoses: same    Admission Condition: Good. Discharged Condition: Good. Indication for Admission: Elective bariatric surgery. Hospital Course: Patient had an uneventful hospital course after her bariatric procedure that went uneventfully: robot assisted sleeve gastrectomy and was tolerating bariatric stage II diet and ambulating without difficulty at the time of discharge. Consults: Hospitalist / PCP. Treatments: IV hydration, antibiotics, analgesia, LMW heparin, respiratory therapy: O2 and incentive spirometry and surgery: Robot assisted sleeve gastrectomy. Discharge Exam:  See daily progress note    Discharge vitals: Wt Readings from Last 3 Encounters:   08/09/21 229 lb (103.9 kg)   08/06/21 232 lb 6.4 oz (105.4 kg)   08/03/21 235 lb 6.4 oz (106.8 kg)   ,  Temp Readings from Last 3 Encounters:   08/10/21 98 °F (36.7 °C) (Oral)   08/09/21 96 °F (35.6 °C)   06/30/21 97.6 °F (36.4 °C)   ,  BP Readings from Last 3 Encounters:   08/10/21 (!) 152/85   08/09/21 (!) 168/109   08/03/21 (!) 136/90   ,  Pulse Readings from Last 3 Encounters:   08/10/21 80   08/03/21 85   06/30/21 89        Disposition: home.     Patient Instructions:   Current Discharge Medication List      START taking these medications    Details   oxyCODONE-acetaminophen (PERCOCET) 5-325 MG per tablet Take 1 tablet by mouth every 6 hours as needed for Pain for up to 5 days. Intended supply: 5 days. Take lowest dose possible to manage pain  Qty: 20 tablet, Refills: 0    Comments: Reduce doses taken as pain becomes manageable  Associated Diagnoses: S/P laparoscopic sleeve gastrectomy      ondansetron (ZOFRAN-ODT) 4 MG disintegrating tablet Take 1 tablet by mouth 3 times daily as needed for Nausea or Vomiting  Qty: 21 tablet, Refills: 2      senna-docusate (SENOKOT S) 8.6-50 MG per tablet Take 1 tablet by mouth daily for 14 days  Qty: 14 tablet, Refills: 0         CONTINUE these medications which have CHANGED    Details   pantoprazole (PROTONIX) 20 MG tablet Take 1 tablet by mouth 2 times daily  Qty: 60 tablet, Refills: 3           Activity: no lifting > 20 Lbs, or Strenuous exercise for 3 weeks. Diet: encourage fluids and bariatric stage II diet for 2 wks.   Wound Care: keep wound clean and dry    Call  [phone] to make a follow-up appointment  with Dr Josh Owusu in 1 week.    ____________________________________________    Signed:    PAULO Crockett CNP    8/10/2021  2:03 PM

## 2021-08-10 NOTE — PROGRESS NOTES
%    Basophils % 0.2 0 - 1 %    Segs Absolute 10.6 K/CU MM    Lymphocytes Absolute 2.2 K/CU MM    Monocytes Absolute 1.3 K/CU MM    Eosinophils Absolute 0.0 K/CU MM    Basophils Absolute 0.0 K/CU MM    Nucleated RBC % 0.0 %    Total Nucleated RBC 0.0 K/CU MM    Total Immature Neutrophil 0.05 K/CU MM    Immature Neutrophil % 0.4 0 - 0.43 %       Scheduled Meds:   sodium chloride flush  10 mL Intravenous 2 times per day    enoxaparin  40 mg Subcutaneous Daily    pantoprazole  40 mg Intravenous BID    scopolamine  1 patch Transdermal Q72H    sennosides-docusate sodium  2 tablet Oral BID    ondansetron  4 mg Intravenous Q4H     Continuous Infusions:   dextrose 5% and 0.45% NaCl with KCl 20 mEq 125 mL/hr at 08/10/21 0556    sodium chloride         Physical Exam:  HEENT: Anicteric sclerae, Oropharyngeal mucosae moist, pink and intact. Heart:  Normal S1 and S2, RRR  Lungs: Clear to auscultation bilaterally, No audible Wheezes or Rales. Extremities: No edema. Neuro: Alert and Oriented x 3, Non focal.  Abdomen: Soft, appropriately tender, Non distended, Positive bowel sounds. Incision: Nicely healing: No erythema, No discharge, glue intact      Active Problems:    S/P laparoscopic sleeve gastrectomy  Resolved Problems:    * No resolved hospital problems. *      Assessment and Plan:  Jef Silvestre is a 28 y.o. female who is POD # 1 status post robot assisted sleeve gastrectomy.    -Pain and nausea under adequate control. -UGI normal. No evidence of leak  -Labs unremarkabkle  -Will advance to bariatric full liquids  -Will plan for discharge later this afternoon once she nears 32oz fluid goal.  Discussed discharge, medications, restrictions, postop care, and diet stages. Patients verbalizes understanding. Increase Ambulation to at least 4x/day walk in the hallways with assistance. Respiratory landon-operative care: encourage incentive Spirometry / deep breathing and coughing 10x/hr while awake.     Continue DVT prophylaxis with Teds and SCDs and SC Lovenox.   Continue GI prophylaxis with Protonix IV till able to tolerate [id number] [address], APRN - CNP, CNP    8/10/2021  10:51 AM  ___________________________________________

## 2021-08-11 ENCOUNTER — TELEPHONE (OUTPATIENT)
Dept: FAMILY MEDICINE CLINIC | Age: 31
End: 2021-08-11

## 2021-08-11 NOTE — TELEPHONE ENCOUNTER
[facility] Transitions Initial Follow Up Call    Call within 2 business days of discharge: Yes    Patient: Juliane Lowery Patient : 1990 MRN: [id number]    [unfilled]    RARS: Readmission Risk Score: 9        Spoke with: Patient    Discharge department/facility: [facility]     Non-face-to-face services provided:  Pt declined appt with PCP    Follow Up  Future Appointments   Date Time Provider Trey Forde   2021 10:00 AM Gladis New MD [address] [facility] [place]  [place]   2021 10:50 AM Amanda Santos MD [facility]       Cali Valdovinos, [address]

## 2021-08-13 ENCOUNTER — OFFICE VISIT (OUTPATIENT)
Dept: BARIATRICS/WEIGHT MGMT | Age: 31
End: 2021-08-13

## 2021-08-13 VITALS
TEMPERATURE: 98.5 F | OXYGEN SATURATION: 97 % | BODY MASS INDEX: 37.7 KG/M2 | WEIGHT: 226.3 LBS | HEIGHT: 65 IN | DIASTOLIC BLOOD PRESSURE: 84 MMHG | SYSTOLIC BLOOD PRESSURE: 120 MMHG | HEART RATE: 101 BPM

## 2021-08-13 DIAGNOSIS — Z98.84 STATUS POST BARIATRIC SURGERY: ICD-10-CM

## 2021-08-13 DIAGNOSIS — Z98.84 STATUS POST LAPAROSCOPIC SLEEVE GASTRECTOMY: ICD-10-CM

## 2021-08-13 DIAGNOSIS — E66.01 MORBID OBESITY DUE TO EXCESS CALORIES (HCC): Primary | ICD-10-CM

## 2021-08-13 PROCEDURE — 99024 POSTOP FOLLOW-UP VISIT: CPT | Performed by: SURGERY

## 2021-08-13 NOTE — PROGRESS NOTES
BARIATRIC SURGERY POST OPERATIVE NOTE    SUBJECTIVE:    Patient presenting today referred from PAULO Calderon - CNP, for   Chief Complaint   Patient presents with    Post-Op Check     PO ABDIRASHID SG @ [facility] 21     /84   Pulse 101   Temp 98.5 °F (36.9 °C)   Ht 5' 4.5\" (1.638 m)   Wt 226 lb 4.8 oz (102.6 kg)   SpO2 97%   BMI 38.24 kg/m²      HPI: Asmita Busch is a 28 y.o. female lost 6.1 Lbs since surgery 4 days ago  Apple juice . .. counseled. .    60 gm prtn  50 Oz water. .      Current Outpatient Medications:     pantoprazole (PROTONIX) 20 MG tablet, Take 1 tablet by mouth 2 times daily, Disp: 60 tablet, Rfl: 3    oxyCODONE-acetaminophen (PERCOCET) 5-325 MG per tablet, Take 1 tablet by mouth every 6 hours as needed for Pain for up to 5 days. Intended supply: 5 days.  Take lowest dose possible to manage pain, Disp: 20 tablet, Rfl: 0    ondansetron (ZOFRAN-ODT) 4 MG disintegrating tablet, Take 1 tablet by mouth 3 times daily as needed for Nausea or Vomiting, Disp: 21 tablet, Rfl: 2    senna-docusate (SENOKOT S) 8.6-50 MG per tablet, Take 1 tablet by mouth daily for 14 days, Disp: 14 tablet, Rfl: 0  Past Medical History:   Diagnosis Date    Anxiety     Depression     Gastric ulcer     Taking Protonix    OCD (obsessive compulsive disorder)     Polycystic ovarian syndrome       Past Surgical History:   Procedure Laterality Date     SECTION  2010, 2016, 11/2016    x3    CHOLECYSTECTOMY, LAPAROSCOPIC  2016    OVARIAN CYST REMOVAL  2014    SLEEVE GASTRECTOMY N/A 2021    GASTRECTOMY SLEEVE LAPAROSCOPIC ROBOTIC performed by Jared Ma MD at [place] [address]  age 10    T & A     TUBAL LIGATION  2016    UPPER GASTROINTESTINAL ENDOSCOPY N/A 2021    EGD BIOPSY performed by Jared aM MD at \[facility]\"" Marital status:      Spouse name: None    Number of children: None    Years of education: None    Highest education level: None   Occupational History    None   Tobacco Use    Smoking status: Former Smoker     Years: 9.00     Quit date: 2020     Years since quittin.6    Smokeless tobacco: Never Used   Vaping Use    Vaping Use: Never used   Substance and Sexual Activity    Alcohol use: Never    Drug use: No    Sexual activity: Yes     Partners: Male   Other Topics Concern    None   Social History Narrative    None     Social Determinants of Health     Financial Resource Strain:     Difficulty of Paying Living Expenses:    Food Insecurity:     Worried About Running Out of Food in the Last Year:     [address] in the Last Year:    Transportation Needs:     Lack of Transportation (Medical):  Lack of Transportation (Non-Medical):    Physical Activity:     Days of Exercise per Week:     Minutes of Exercise per Session:    Stress:     Feeling of Stress :    Social Connections:     Frequency of Communication with Friends and Family:     Frequency of Social Gatherings with Friends and Family:     Attends Christian Services:     Active Member of Clubs or Organizations:     Attends Club or Organization Meetings:     Marital Status:    Intimate Partner Violence:     Fear of Current or Ex-Partner:     Emotionally Abused:     Physically Abused:     Sexually Abused:      Family History   Problem Relation Age of Onset    High Blood Pressure Mother     No Known Problems Father     No Known Problems Maternal Grandmother     No Known Problems Maternal Grandfather     Cancer Paternal Grandmother     No Known Problems Paternal Grandfather      Review of Systems      OBJECTIVE:    Physical Exam      Assessment / Plan:    1. Morbid obesity due to excess calories ([address]    2. Status post laparoscopic sleeve gastrectomy    3. Status post bariatric surgery        60 gm prtn  50 Oz water. [facility] and [facility]D.     Follow Up:  Return in about 4 weeks (around 9/10/2021) for Bariatric follow up: diet, exercise & weight loss, Follow up Symptoms.     Lucy Munoz MD, FACS, FICS  Member of the [employer] of Metabolic and Bariatric Surgeons    [phone]    8/13/21

## 2021-09-01 RX ORDER — PANTOPRAZOLE SODIUM 20 MG/1
TABLET, DELAYED RELEASE ORAL
Qty: 60 TABLET | Refills: 3 | Status: SHIPPED | OUTPATIENT
Start: 2021-09-01 | End: 2021-11-10

## 2021-09-22 ENCOUNTER — OFFICE VISIT (OUTPATIENT)
Dept: BARIATRICS/WEIGHT MGMT | Age: 31
End: 2021-09-22

## 2021-09-22 VITALS
SYSTOLIC BLOOD PRESSURE: 120 MMHG | WEIGHT: 209.2 LBS | BODY MASS INDEX: 35.71 KG/M2 | DIASTOLIC BLOOD PRESSURE: 80 MMHG | OXYGEN SATURATION: 100 % | HEIGHT: 64 IN | HEART RATE: 76 BPM

## 2021-09-22 DIAGNOSIS — Z98.84 STATUS POST LAPAROSCOPIC SLEEVE GASTRECTOMY: Primary | ICD-10-CM

## 2021-09-22 PROCEDURE — 99024 POSTOP FOLLOW-UP VISIT: CPT | Performed by: SURGERY

## 2021-09-22 NOTE — PROGRESS NOTES
BARIATRIC SURGERY POST OPERATIVE NOTE    SUBJECTIVE:    Patient presenting today referred from Jorden Dominguez, PAULO - CNP, for   Chief Complaint   Patient presents with    Weight Management     p/o #2 s/g 21     /80   Pulse 76   Ht 5' 4\" (1.626 m)   Wt 209 lb 3.2 oz (94.9 kg)   SpO2 100%   BMI 35.91 kg/m²      HPI: Giulia Zepeda is a 28 y.o. female s/p sleeve gasrtectomy 21, lost 17 Lbs, 5 miles the other day, 2 miles daily; and doing her MVI and Ca/D.     Current Outpatient Medications:     pantoprazole (PROTONIX) 20 MG tablet, TAKE 1 TABLET BY MOUTH TWICE A DAY, Disp: 60 tablet, Rfl: 3    ondansetron (ZOFRAN-ODT) 4 MG disintegrating tablet, Take 1 tablet by mouth 3 times daily as needed for Nausea or Vomiting, Disp: 21 tablet, Rfl: 2  Past Medical History:   Diagnosis Date    Anxiety     Depression     Gastric ulcer     Taking Protonix    OCD (obsessive compulsive disorder)     Polycystic ovarian syndrome       Past Surgical History:   Procedure Laterality Date     SECTION  2010, 2016, 11/2016    x3    CHOLECYSTECTOMY, LAPAROSCOPIC  2016    OVARIAN CYST REMOVAL  2014    SLEEVE GASTRECTOMY N/A 2021    GASTRECTOMY SLEEVE LAPAROSCOPIC ROBOTIC performed by Jennifer Valdes MD at [facility]  age 10    T & A     TUBAL LIGATION  2016    UPPER GASTROINTESTINAL ENDOSCOPY N/A 2021    EGD BIOPSY performed by Jennifer Valdes MD at [address] [place] [address] Road History     Socioeconomic History    Marital status:      Spouse name: None    Number of children: None    Years of education: None    Highest education level: None   Occupational History    None   Tobacco Use    Smoking status: Former Smoker     Years: 9.00     Quit date: 2020     Years since quittin.7    Smokeless tobacco: Never Used   Vaping Use    Vaping Use: Never used   Substance and Sexual Activity    Alcohol use: Never    Drug use: No    Sexual activity: Yes     Partners: Male   Other Topics Concern    None   Social History Narrative    None     Social Determinants of Health     Financial Resource Strain:     Difficulty of Paying Living Expenses:    Food Insecurity:     Worried About Running Out of Food in the Last Year:     Ran Out of Food in the Last Year:    Transportation Needs:     Lack of Transportation (Medical):  Lack of Transportation (Non-Medical):    Physical Activity:     Days of Exercise per Week:     Minutes of Exercise per Session:    Stress:     Feeling of Stress :    Social Connections:     Frequency of Communication with Friends and Family:     Frequency of Social Gatherings with Friends and Family:     Attends Congregational Services:     Active Member of Clubs or Organizations:     Attends Club or Organization Meetings:     Marital Status:    Intimate Partner Violence:     Fear of Current or Ex-Partner:     Emotionally Abused:     Physically Abused:     Sexually Abused:      Family History   Problem Relation Age of Onset    High Blood Pressure Mother     No Known Problems Father     No Known Problems Maternal Grandmother     No Known Problems Maternal Grandfather     Cancer Paternal Grandmother     No Known Problems Paternal Grandfather      Review of Systems      OBJECTIVE:    Physical Exam        Assessment / Plan:    1. Status post laparoscopic sleeve gastrectomy          Ethan Wynn is a 28 y.o. female s/p sleeve gasrtectomy 8/9/21, lost 17 Lbs, 5 miles the other day, 2 miles daily; and doing her MVI and Ca/D. Follow Up:  Return in about 2 months (around 11/22/2021) for Bariatric follow up: diet, exercise & weight loss.     Anne Tavarez MD, FACS, FICS  Member of the [employer] of Metabolic and Bariatric Surgeons    [phone]    9/22/21

## 2021-11-10 ENCOUNTER — OFFICE VISIT (OUTPATIENT)
Dept: FAMILY MEDICINE CLINIC | Age: 31
End: 2021-11-10
Payer: COMMERCIAL

## 2021-11-10 VITALS
WEIGHT: 189.6 LBS | OXYGEN SATURATION: 97 % | DIASTOLIC BLOOD PRESSURE: 76 MMHG | SYSTOLIC BLOOD PRESSURE: 120 MMHG | TEMPERATURE: 98.6 F | HEART RATE: 82 BPM | RESPIRATION RATE: 16 BRPM | BODY MASS INDEX: 32.54 KG/M2

## 2021-11-10 DIAGNOSIS — L08.9 PIERCED BELLY BUTTON INFECTION: Primary | ICD-10-CM

## 2021-11-10 DIAGNOSIS — S31.135A PIERCED BELLY BUTTON INFECTION: Primary | ICD-10-CM

## 2021-11-10 PROCEDURE — 99213 OFFICE O/P EST LOW 20 MIN: CPT | Performed by: NURSE PRACTITIONER

## 2021-11-10 PROCEDURE — 1036F TOBACCO NON-USER: CPT | Performed by: NURSE PRACTITIONER

## 2021-11-10 PROCEDURE — G8427 DOCREV CUR MEDS BY ELIG CLIN: HCPCS | Performed by: NURSE PRACTITIONER

## 2021-11-10 PROCEDURE — G8417 CALC BMI ABV UP PARAM F/U: HCPCS | Performed by: NURSE PRACTITIONER

## 2021-11-10 PROCEDURE — G8484 FLU IMMUNIZE NO ADMIN: HCPCS | Performed by: NURSE PRACTITIONER

## 2021-11-10 RX ORDER — CIPROFLOXACIN 250 MG/1
250 TABLET, FILM COATED ORAL 2 TIMES DAILY
Qty: 10 TABLET | Refills: 0 | Status: SHIPPED | OUTPATIENT
Start: 2021-11-10 | End: 2021-11-15

## 2021-11-10 RX ORDER — MUPIROCIN CALCIUM 20 MG/G
CREAM TOPICAL
Qty: 1 EACH | Refills: 0 | Status: SHIPPED | OUTPATIENT
Start: 2021-11-10 | End: 2021-12-10

## 2021-11-10 RX ORDER — M-VIT,TX,IRON,MINS/CALC/FOLIC 27MG-0.4MG
1 TABLET ORAL DAILY
COMMUNITY
End: 2021-11-18

## 2021-11-10 ASSESSMENT — ENCOUNTER SYMPTOMS
COUGH: 0
WHEEZING: 0
NAUSEA: 0
CHEST TIGHTNESS: 0
VOMITING: 0
SHORTNESS OF BREATH: 0
ABDOMINAL PAIN: 0
CONSTIPATION: 0
DIARRHEA: 0

## 2021-11-10 ASSESSMENT — PATIENT HEALTH QUESTIONNAIRE - PHQ9
SUM OF ALL RESPONSES TO PHQ QUESTIONS 1-9: 0
SUM OF ALL RESPONSES TO PHQ QUESTIONS 1-9: 0
1. LITTLE INTEREST OR PLEASURE IN DOING THINGS: 0
SUM OF ALL RESPONSES TO PHQ QUESTIONS 1-9: 0
SUM OF ALL RESPONSES TO PHQ9 QUESTIONS 1 & 2: 0
2. FEELING DOWN, DEPRESSED OR HOPELESS: 0

## 2021-11-10 NOTE — PROGRESS NOTES
SUBJECTIVE:    Precious Andino  1990  28 y.o.  female      Chief Complaint   Patient presents with    Other     Pt here with c/o infected belly button piercing x 1 week     HPI     Patient states she completed gastric sleeve surgery two months ago. She has lost a good amount of weight already and decided to get her naval pierced. She received the piercing less than a week ago. One of the balls fell off and she had to apply a new one. Did not remove the bar. Later that night she noticed redness. Progressed to swelling, drainage, tenderness. She has been cleaning with salt water and symptoms gradually improving. No Known Allergies    Current Outpatient Medications   Medication Sig Dispense Refill    Multiple Vitamins-Minerals (THERAPEUTIC MULTIVITAMIN-MINERALS) tablet Take 1 tablet by mouth daily      ciprofloxacin (CIPRO) 250 MG tablet Take 1 tablet by mouth 2 times daily for 5 days 10 tablet 0    mupirocin (BACTROBAN) 2 % cream Apply topically 3 times daily for 7 days 1 each 0     No current facility-administered medications for this visit.           Past Medical History:   Diagnosis Date    Anxiety     Depression     Gastric ulcer     Taking Protonix    OCD (obsessive compulsive disorder)     Polycystic ovarian syndrome      Past Surgical History:   Procedure Laterality Date     SECTION  2010, 2016, 11/2016    x3    CHOLECYSTECTOMY, LAPAROSCOPIC  2016    OVARIAN CYST REMOVAL  2014    SLEEVE GASTRECTOMY N/A 2021    GASTRECTOMY SLEEVE LAPAROSCOPIC ROBOTIC performed by Abdiel Thompson MD at [place] [address]  age 10    T & A     TUBAL LIGATION  2016    UPPER GASTROINTESTINAL ENDOSCOPY N/A 2021    EGD BIOPSY performed by Abdiel [facility] MD at [facility] Marital status:      Spouse name: None    Number of children: None    Years of education: None    Highest education level: None Occupational History    None   Tobacco Use    Smoking status: Former Smoker     Years: 9.00     Quit date: 2020     Years since quittin.9    Smokeless tobacco: Never Used   Vaping Use    Vaping Use: Never used   Substance and Sexual Activity    Alcohol use: Never    Drug use: No    Sexual activity: Yes     Partners: Male   Other Topics Concern    None   Social History Narrative    None     Social Determinants of Health     Financial Resource Strain:     Difficulty of Paying Living Expenses: Not on file   Food Insecurity:     Worried About Running Out of Food in the Last Year: Not on file    Marcus of Food in the Last Year: Not on file   Transportation Needs:     Lack of Transportation (Medical): Not on file    Lack of Transportation (Non-Medical): Not on file   Physical Activity:     Days of Exercise per Week: Not on file    Minutes of Exercise per Session: Not on file   Stress:     Feeling of Stress : Not on file   Social Connections:     Frequency of Communication with Friends and Family: Not on file    Frequency of Social Gatherings with Friends and Family: Not on file    Attends Scientologist Services: Not on file    Active Member of [address] [place] [address] or Organizations: Not on file    Attends Club or Organization Meetings: Not on file    Marital Status: Not on file   Intimate Partner Violence:     Fear of Current or Ex-Partner: Not on file    Emotionally Abused: Not on file    Physically Abused: Not on file    Sexually Abused: Not on file   Housing Stability:     Unable to Pay for Housing in the Last Year: Not on file    Number of [place] in the Last Year: Not on file    Unstable Housing in the Last Year: Not on file         No problem-specific Assessment & Plan notes found for this encounter. Review of Systems   Constitutional: Negative for appetite change, chills, fatigue, fever and unexpected weight change.    Respiratory: Negative for cough, chest tightness, shortness of breath and wheezing. Cardiovascular: Negative for chest pain, palpitations and leg swelling. Gastrointestinal: Negative for abdominal pain, constipation, diarrhea, nausea and vomiting. Musculoskeletal: Negative for arthralgias. Skin: Positive for wound. Neurological: Negative for weakness, numbness and headaches. Hematological: Negative for adenopathy. OBJECTIVE:    /76 (Site: Left Upper Arm, Position: Sitting, Cuff Size: Medium Adult)   Pulse 82   Temp 98.6 °F (37 °C)   Resp 16   Wt 189 lb 9.6 oz (86 kg)   LMP 10/10/2021   SpO2 97%   BMI 32.54 kg/m²     Physical Exam  Constitutional:       Appearance: Normal appearance. She is well-developed. HENT:      Head: Normocephalic and atraumatic. Right Ear: Hearing normal.      Left Ear: Hearing normal.   Cardiovascular:      Rate and Rhythm: Normal rate and regular rhythm. Heart sounds: Normal heart sounds. No murmur heard. No friction rub. No gallop. Pulmonary:      Effort: Pulmonary effort is normal.      Breath sounds: Normal breath sounds. No wheezing, rhonchi or rales. Musculoskeletal:         General: Normal range of motion. Cervical back: Normal range of motion and neck supple. Skin:     General: Skin is warm and dry. Comments: Naval piercing with redness, swelling. Small amount of purulent drainage. Neurological:      General: No focal deficit present. Mental Status: She is alert and oriented to person, place, and time. Psychiatric:         Mood and Affect: Mood normal.         Behavior: Behavior normal. Behavior is cooperative. Thought Content: Thought content normal.         ASSESSMENT/PLAN:    1. Pierced belly button infection  Antibiotics as prescribed. Discussed removing piercing and patient refuses. Keep area clean. Follow up if not improving.   - ciprofloxacin (CIPRO) 250 MG tablet; Take 1 tablet by mouth 2 times daily for 5 days  Dispense: 10 tablet;  Refill: 0  - mupirocin (BACTROBAN) 2 % cream; Apply topically 3 times daily for 7 days  Dispense: 1 each; Refill: 0               Return if symptoms worsen or fail to improve.       (Please note that portions of this note may have been completed with a voice recognition program. Efforts were made to edit the dictations but occasionally words aremis-transcribed.)

## 2021-11-18 ENCOUNTER — OFFICE VISIT (OUTPATIENT)
Dept: BARIATRICS/WEIGHT MGMT | Age: 31
End: 2021-11-18
Payer: COMMERCIAL

## 2021-11-18 VITALS
HEART RATE: 59 BPM | WEIGHT: 183.9 LBS | BODY MASS INDEX: 31.4 KG/M2 | OXYGEN SATURATION: 97 % | DIASTOLIC BLOOD PRESSURE: 80 MMHG | HEIGHT: 64 IN | SYSTOLIC BLOOD PRESSURE: 120 MMHG

## 2021-11-18 DIAGNOSIS — Z98.84 STATUS POST LAPAROSCOPIC SLEEVE GASTRECTOMY: Primary | ICD-10-CM

## 2021-11-18 PROCEDURE — G8484 FLU IMMUNIZE NO ADMIN: HCPCS | Performed by: SURGERY

## 2021-11-18 PROCEDURE — G8417 CALC BMI ABV UP PARAM F/U: HCPCS | Performed by: SURGERY

## 2021-11-18 PROCEDURE — G8427 DOCREV CUR MEDS BY ELIG CLIN: HCPCS | Performed by: SURGERY

## 2021-11-18 PROCEDURE — 99213 OFFICE O/P EST LOW 20 MIN: CPT | Performed by: SURGERY

## 2021-11-18 PROCEDURE — 1036F TOBACCO NON-USER: CPT | Performed by: SURGERY

## 2021-11-18 RX ORDER — CALCIUM CITRATE/VITAMIN D3 200MG-6.25
TABLET ORAL
COMMUNITY

## 2021-11-18 NOTE — PROGRESS NOTES
BARIATRIC SURGERY OFFICE PROGRESS NOTE    SUBJECTIVE:    Patient presenting today referred from PAULO Arenas CNP, for   Chief Complaint   Patient presents with    Weight Management     1ST F/U - S/P ABDIRASHID SG 21 former Dr. Valeriano Alberto pt. .    Vitals:    21 1308   BP: 120/80   Pulse: 59   SpO2: 97%        BMI: Body mass index is 31.57 kg/m². Weight History: Wt Readings from Last 3 Encounters:   21 183 lb 14.4 oz (83.4 kg)   11/10/21 189 lb 9.6 oz (86 kg)   21 209 lb 3.2 oz (94.9 kg)        If within 30 days of bariatric surgery date, have you been to the ED: N/A      HPI:Damaris Baca is a 28 y.o. female presenting in third bariatric POST-OP visit. Total weight loss/gain: -25.3 lbs since last visit, -48.4 lbs since surgery, -54.1 lbs since starting program    Thoroughly reviewed the patient's medical history, family history, social history and review of systems with the patient today in the office. Please see medical record for pertinent positives. Changes in health since last visit: None, doing very well. Taking multivitamin. Pt tracking calories: Yes, and stops eating when full. Protein ~ 90 g / day. Pt exercising: Yes, jogging most days.        Past Medical History:   Diagnosis Date    Anxiety     Depression     Gastric ulcer     Taking Protonix    OCD (obsessive compulsive disorder)     Polycystic ovarian syndrome         Patient Active Problem List   Diagnosis    CCC (chronic calculous cholecystitis)    Pilar cyst    Former smoker    Morbid obesity with BMI of 40.0-44.9, adult ([address]    S/P laparoscopic sleeve gastrectomy    Morbid obesity due to excess calories ([address]    Status post laparoscopic sleeve gastrectomy    Status post bariatric surgery       Past Surgical History:   Procedure Laterality Date     SECTION  2010, 2016, 11/2016    x3    CHOLECYSTECTOMY, LAPAROSCOPIC  2016    OVARIAN CYST REMOVAL  2014    SLEEVE GASTRECTOMY N/A 8/9/2021    GASTRECTOMY SLEEVE LAPAROSCOPIC ROBOTIC performed by Washington Hughes MD at [facility]  age 10    T & A     TUBAL LIGATION  11/17/2016    UPPER GASTROINTESTINAL ENDOSCOPY N/A 6/1/2021    EGD BIOPSY performed by Washington Hughes MD at [facility] ENDOSCOPY       Current Outpatient Medications   Medication Sig Dispense Refill    Multiple Vitamins-Minerals (MULTIVITAMIN GUMMIES WOMENS) CHEW Take by mouth 2 daily      mupirocin (BACTROBAN) 2 % cream Apply topically 3 times daily for 7 days 1 each 0     No current facility-administered medications for this visit. No Known Allergies      Review of Systems   All other systems reviewed and are negative. OBJECTIVE:    /80   Pulse 59   Ht 5' 4\" (1.626 m)   Wt 183 lb 14.4 oz (83.4 kg)   SpO2 97%   BMI 31.57 kg/m²      Physical Exam  Vitals reviewed. Constitutional:       General: She is not in acute distress. Appearance: She is obese. She is not ill-appearing, toxic-appearing or diaphoretic. HENT:      Head: Normocephalic and atraumatic. Right Ear: External ear normal.      Left Ear: External ear normal.      Nose: Nose normal.   Eyes:      General:         Right eye: No discharge. Left eye: No discharge. Extraocular Movements: Extraocular movements intact. Cardiovascular:      Rate and Rhythm: Normal rate. Pulmonary:      Effort: No respiratory distress. Abdominal:      Palpations: Abdomen is soft. Tenderness: There is no abdominal tenderness. Comments: Incisions well-healed     Musculoskeletal:         General: No swelling. Cervical back: Normal range of motion. Skin:     General: Skin is warm. Neurological:      General: No focal deficit present. Mental Status: She is alert. ASSESSMENT & PLAN:    1. Status post laparoscopic sleeve gastrectomy  -Doing well. Down 48.4 lbs since surgery, 54.1 lbs since starting program.  -800 alexandre  /day, min 60 g protein / day.

## 2021-11-22 ENCOUNTER — TELEPHONE (OUTPATIENT)
Dept: SURGERY | Age: 31
End: 2021-11-22

## 2021-12-28 ENCOUNTER — HOSPITAL ENCOUNTER (OUTPATIENT)
Age: 31
Discharge: HOME | End: 2021-12-28
Payer: COMMERCIAL

## 2021-12-28 LAB
ALBUMIN SERPL-MCNC: 4.2 GM/DL (ref 3.4–5)
ALP BLD-CCNC: 59 IU/L (ref 40–129)
ALT SERPL-CCNC: 11 U/L (ref 10–40)
ANION GAP SERPL CALCULATED.3IONS-SCNC: 11 MMOL/L (ref 4–16)
AST SERPL-CCNC: 8 IU/L (ref 15–37)
BASOPHILS ABSOLUTE: 0 K/CU MM
BASOPHILS RELATIVE PERCENT: 0.4 % (ref 0–1)
BILIRUB SERPL-MCNC: 0.3 MG/DL (ref 0–1)
BUN BLDV-MCNC: 20 MG/DL (ref 6–23)
CALCIUM SERPL-MCNC: 9.6 MG/DL (ref 8.3–10.6)
CHLORIDE BLD-SCNC: 101 MMOL/L (ref 99–110)
CHOLESTEROL, FASTING: 186 MG/DL
CO2: 26 MMOL/L (ref 21–32)
CREAT SERPL-MCNC: 0.7 MG/DL (ref 0.6–1.1)
DIFFERENTIAL TYPE: ABNORMAL
EOSINOPHILS ABSOLUTE: 0.1 K/CU MM
EOSINOPHILS RELATIVE PERCENT: 0.9 % (ref 0–3)
ESTIMATED AVERAGE GLUCOSE: 111 MG/DL
FOLATE: >20 NG/ML (ref 3.1–17.5)
GFR AFRICAN AMERICAN: >60 ML/MIN/1.73M2
GFR NON-AFRICAN AMERICAN: >60 ML/MIN/1.73M2
GLUCOSE BLD-MCNC: 85 MG/DL (ref 70–99)
HBA1C MFR BLD: 5.5 % (ref 4.2–6.3)
HCT VFR BLD CALC: 39.6 % (ref 37–47)
HDLC SERPL-MCNC: 42 MG/DL
HEMOGLOBIN: 13.1 GM/DL (ref 12.5–16)
IMMATURE NEUTROPHIL %: 0.1 % (ref 0–0.43)
IRON: 48 UG/DL (ref 37–145)
LDL CHOLESTEROL DIRECT: 124 MG/DL
LYMPHOCYTES ABSOLUTE: 1.6 K/CU MM
LYMPHOCYTES RELATIVE PERCENT: 19.8 % (ref 24–44)
MAGNESIUM: 2.4 MG/DL (ref 1.8–2.4)
MCH RBC QN AUTO: 29.8 PG (ref 27–31)
MCHC RBC AUTO-ENTMCNC: 33.1 % (ref 32–36)
MCV RBC AUTO: 90 FL (ref 78–100)
MONOCYTES ABSOLUTE: 0.6 K/CU MM
MONOCYTES RELATIVE PERCENT: 7 % (ref 0–4)
PCT TRANSFERRIN: 17 % (ref 10–44)
PDW BLD-RTO: 11.9 % (ref 11.7–14.9)
PLATELET # BLD: 385 K/CU MM (ref 140–440)
PMV BLD AUTO: 9.3 FL (ref 7.5–11.1)
POTASSIUM SERPL-SCNC: 4.3 MMOL/L (ref 3.5–5.1)
RBC # BLD: 4.4 M/CU MM (ref 4.2–5.4)
SEGMENTED NEUTROPHILS ABSOLUTE COUNT: 5.7 K/CU MM
SEGMENTED NEUTROPHILS RELATIVE PERCENT: 71.8 % (ref 36–66)
SODIUM BLD-SCNC: 138 MMOL/L (ref 135–145)
TOTAL IMMATURE NEUTOROPHIL: 0.01 K/CU MM
TOTAL IRON BINDING CAPACITY: 275 UG/DL (ref 250–450)
TOTAL PROTEIN: 7.3 GM/DL (ref 6.4–8.2)
TRIGLYCERIDE, FASTING: 84 MG/DL
TSH HIGH SENSITIVITY: 1.81 UIU/ML (ref 0.27–4.2)
UNSATURATED IRON BINDING CAPACITY: 227 UG/DL (ref 110–370)
VITAMIN B-12: 406.7 PG/ML (ref 211–911)
VITAMIN D 25-HYDROXY: 55.42 NG/ML
WBC # BLD: 7.9 K/CU MM (ref 4–10.5)

## 2021-12-28 PROCEDURE — 84425 ASSAY OF VITAMIN B-1: CPT

## 2021-12-28 PROCEDURE — 80053 COMPREHEN METABOLIC PANEL: CPT

## 2021-12-28 PROCEDURE — 83540 ASSAY OF IRON: CPT

## 2021-12-28 PROCEDURE — 36415 COLL VENOUS BLD VENIPUNCTURE: CPT

## 2021-12-28 PROCEDURE — 83036 HEMOGLOBIN GLYCOSYLATED A1C: CPT

## 2021-12-28 PROCEDURE — 82306 VITAMIN D 25 HYDROXY: CPT

## 2021-12-28 PROCEDURE — 84590 ASSAY OF VITAMIN A: CPT

## 2021-12-28 PROCEDURE — 84630 ASSAY OF ZINC: CPT

## 2021-12-28 PROCEDURE — 83735 ASSAY OF MAGNESIUM: CPT

## 2021-12-28 PROCEDURE — 80061 LIPID PANEL: CPT

## 2021-12-28 PROCEDURE — 84443 ASSAY THYROID STIM HORMONE: CPT

## 2021-12-28 PROCEDURE — 83550 IRON BINDING TEST: CPT

## 2021-12-28 PROCEDURE — 85025 COMPLETE CBC W/AUTO DIFF WBC: CPT

## 2021-12-28 PROCEDURE — 82746 ASSAY OF FOLIC ACID SERUM: CPT

## 2021-12-28 PROCEDURE — 82607 VITAMIN B-12: CPT

## 2021-12-30 LAB — ZINC: 89.6 UG/DL (ref 60–120)

## 2021-12-31 LAB
RETINYL PALMITATE: <0.02 MG/L (ref 0–0.1)
VITAMIN A LEVEL: 0.68 MG/L (ref 0.3–1.2)
VITAMIN A, INTERP: NORMAL
VITAMIN B1, PLASMA: 140 NMOL/L (ref 70–180)

## 2022-02-10 ENCOUNTER — OFFICE VISIT (OUTPATIENT)
Dept: BARIATRICS/WEIGHT MGMT | Age: 32
End: 2022-02-10
Payer: COMMERCIAL

## 2022-02-10 VITALS
BODY MASS INDEX: 27.66 KG/M2 | SYSTOLIC BLOOD PRESSURE: 104 MMHG | WEIGHT: 162 LBS | HEART RATE: 85 BPM | DIASTOLIC BLOOD PRESSURE: 62 MMHG | HEIGHT: 64 IN

## 2022-02-10 DIAGNOSIS — Z98.84 S/P LAPAROSCOPIC SLEEVE GASTRECTOMY: Primary | ICD-10-CM

## 2022-02-10 PROCEDURE — G8484 FLU IMMUNIZE NO ADMIN: HCPCS | Performed by: NURSE PRACTITIONER

## 2022-02-10 PROCEDURE — 1036F TOBACCO NON-USER: CPT | Performed by: NURSE PRACTITIONER

## 2022-02-10 PROCEDURE — 99213 OFFICE O/P EST LOW 20 MIN: CPT | Performed by: NURSE PRACTITIONER

## 2022-02-10 PROCEDURE — G8417 CALC BMI ABV UP PARAM F/U: HCPCS | Performed by: NURSE PRACTITIONER

## 2022-02-10 PROCEDURE — G8427 DOCREV CUR MEDS BY ELIG CLIN: HCPCS | Performed by: NURSE PRACTITIONER

## 2022-02-10 ASSESSMENT — ENCOUNTER SYMPTOMS
SHORTNESS OF BREATH: 0
DIARRHEA: 0
WHEEZING: 0
CHEST TIGHTNESS: 0
APNEA: 0
BACK PAIN: 0
COLOR CHANGE: 0
SORE THROAT: 0
PHOTOPHOBIA: 0
NAUSEA: 0

## 2022-02-10 NOTE — PROGRESS NOTES
BARIATRIC SURGERY OFFICE PROGRESS NOTE    SUBJECTIVE:    Patient presenting today referred from PAULO Wynn CNP, for   Chief Complaint   Patient presents with   [facility] Post Op Follow Up     5 Mon F/U S/P Sleeve, 21   . Vitals:    02/10/22 1303   BP: 104/62   Pulse: 85        BMI: Body mass index is 27.81 kg/m². Weight History: Wt Readings from Last 3 Encounters:   02/10/22 162 lb (73.5 kg)   21 183 lb 14.4 oz (83.4 kg)   11/10/21 189 lb 9.6 oz (86 kg)        If within 30 days of bariatric surgery date, have you been to the ED: N/A, No, Yes - NA      HPI:Damaris Will is a 28 y.o. female presenting in fourth bariatric POST-OP visit. Total weight loss/gain:   -21.9 lbs since last visit  -70.3 lbs since surgery  -76.0 lbs since starting program    Changes in health since last visit: denies    Pt tracking calories: not tracking calories. Eating a lot of protein. Pt exercising: working a lot.     Pt taking vitamins: supplementing    Fluid intake: drinks plenty of water    Past Medical History:   Diagnosis Date    Anxiety     Depression     Gastric ulcer     Taking Protonix    OCD (obsessive compulsive disorder)     Polycystic ovarian syndrome         Patient Active Problem List   Diagnosis    CCC (chronic calculous cholecystitis)    Pilar cyst    Former smoker    Morbid obesity with BMI of 40.0-44.9, adult ([address]    S/P laparoscopic sleeve gastrectomy    Morbid obesity due to excess calories ([address]    Status post laparoscopic sleeve gastrectomy    Status post bariatric surgery       Past Surgical History:   Procedure Laterality Date     SECTION  2010, 2016, 11/2016    x3    CHOLECYSTECTOMY, LAPAROSCOPIC  2016    OVARIAN CYST REMOVAL  2014    SLEEVE GASTRECTOMY N/A 2021    GASTRECTOMY SLEEVE LAPAROSCOPIC ROBOTIC performed by Valorie Butterfield MD at [address] [place] [address]  age 10    T & A     TUBAL LIGATION  2016    UPPER GASTROINTESTINAL ENDOSCOPY N/A 6/1/2021    EGD BIOPSY performed by Alie Sunshine MD at [facility] ENDOSCOPY       Current Outpatient Medications   Medication Sig Dispense Refill    Multiple Vitamins-Minerals (MULTIVITAMIN GUMMIES WOMENS) CHEW Take by mouth 2 daily       No current facility-administered medications for this visit. No Known Allergies      Review of Systems   Constitutional: Negative for fatigue and fever. HENT: Negative for congestion, dental problem and sore throat. Eyes: Negative for photophobia and visual disturbance. Respiratory: Negative for apnea, chest tightness, shortness of breath and wheezing. Cardiovascular: Negative for chest pain and leg swelling. Gastrointestinal: Negative for diarrhea and nausea. Endocrine: Negative for cold intolerance and heat intolerance. Genitourinary: Negative for difficulty urinating, dysuria, flank pain, frequency and hematuria. Musculoskeletal: Negative for arthralgias and back pain. Skin: Negative for color change, rash and wound. Allergic/Immunologic: Negative for environmental allergies, food allergies and immunocompromised state. Neurological: Negative for dizziness, weakness, light-headedness and numbness. Hematological: Negative for adenopathy. Does not bruise/bleed easily. Psychiatric/Behavioral: Negative for behavioral problems, confusion, sleep disturbance and suicidal ideas. OBJECTIVE:    /62 (Site: Right Upper Arm, Position: Sitting, Cuff Size: Medium Adult)   Pulse 85   Ht 5' 4\" (1.626 m)   Wt 162 lb (73.5 kg)   BMI 27.81 kg/m²      Physical Exam  Vitals reviewed. Constitutional:       Appearance: She is obese. HENT:      Head: Normocephalic and atraumatic. Right Ear: External ear normal.      Left Ear: External ear normal.      Nose: Nose normal.      Mouth/Throat:      Mouth: Mucous membranes are moist.   Eyes:      Extraocular Movements: Extraocular movements intact.       Pupils: Pupils are equal, round, and reactive to light. Cardiovascular:      Rate and Rhythm: Normal rate and regular rhythm. Pulses: Normal pulses. Heart sounds: Normal heart sounds. Pulmonary:      Effort: Pulmonary effort is normal.      Breath sounds: Normal breath sounds. Abdominal:      General: Bowel sounds are normal.   Musculoskeletal:         General: Normal range of motion. Cervical back: Normal range of motion and neck supple. Skin:     General: Skin is warm and dry. Neurological:      General: No focal deficit present. Mental Status: She is alert and oriented to person, place, and time. Mental status is at baseline. Psychiatric:         Mood and Affect: Mood normal.         Behavior: Behavior normal.       ASSESSMENT & PLAN:    1. S/P laparoscopic sleeve gastrectomy  - CBC Auto Differential; Future  - Comprehensive Metabolic Panel; Future  - Hemoglobin A1C; Future  - Iron and TIBC; Future  - Lipid Panel; Future  - Magnesium; Future  - TSH with Reflex; Future  - Vitamin B1; Future  - Vitamin B12 & Folate; Future  - Vitamin D 25 Hydroxy; Future  - Zinc; Future    - Doing well overall  - Down 70lbs since surgery  - Tolerating diet  - Increase activity as tolerated. As of current visit, regarding obesity-related co-morbid conditions:  CARRINGTON [] compliant [] no longer using [] resolved per sleep study; hypertension [] medications; hyperlipidemia [] medications; GERD [] medications; DM [] insulin [] non-insulin [] no meds      The patient expressed understanding and willingness to comply nicely; all questions and concerns addressed. No orders of the defined types were placed in this encounter.     Orders Placed This Encounter   Procedures    CBC Auto Differential     Standing Status:   Future     Standing Expiration Date:   2/10/2023    Comprehensive Metabolic Panel     Standing Status:   Future     Standing Expiration Date:   2/10/2023    Hemoglobin A1C     Standing Status:   Future Standing Expiration Date:   2/10/2023    Iron and TIBC     Standing Status:   Future     Standing Expiration Date:   2/10/2023     Order Specific Question:   Is Patient Fasting? Answer:   yes     Order Specific Question:   No of Hours? Answer:   8    Lipid Panel     Standing Status:   Future     Standing Expiration Date:   2/10/2023     Order Specific Question:   Is Patient Fasting?/# of Hours     Answer:   8/yes    Magnesium     Standing Status:   Future     Standing Expiration Date:   2/10/2023    TSH with Reflex     Standing Status:   Future     Standing Expiration Date:   2/10/2023    Vitamin B1     Standing Status:   Future     Standing Expiration Date:   2/10/2023    Vitamin B12 & Folate     Standing Status:   Future     Standing Expiration Date:   2/10/2023    Vitamin D 25 Hydroxy     Standing Status:   Future     Standing Expiration Date:   2/10/2023    Zinc     Standing Status:   Future     Standing Expiration Date:   2/10/2023       Follow Up:  Return in about 1 month (around 3/10/2022).     [facility]

## 2022-02-18 ENCOUNTER — TELEPHONE (OUTPATIENT)
Dept: SURGERY | Age: 32
End: 2022-02-18

## 2022-02-18 NOTE — TELEPHONE ENCOUNTER
CALLED PT AS A FRIENDLY REMINDER TO GET LABS DRAWN, PT STATED SHE IS GOING TO GET LABS DRAWN BEFORE NEXT APPT

## 2022-04-28 ENCOUNTER — HOSPITAL ENCOUNTER (OUTPATIENT)
Age: 32
Discharge: HOME | End: 2022-04-28
Payer: COMMERCIAL

## 2022-04-28 LAB
CORTISOL - AM: 0.8 UG/DL (ref 6–18.4)
PROLACTIN: 19.9 NG/ML

## 2022-04-28 PROCEDURE — 83498 ASY HYDROXYPROGESTERONE 17-D: CPT

## 2022-04-28 PROCEDURE — 82157 ASSAY OF ANDROSTENEDIONE: CPT

## 2022-04-28 PROCEDURE — 82627 DEHYDROEPIANDROSTERONE: CPT

## 2022-04-28 PROCEDURE — 36415 COLL VENOUS BLD VENIPUNCTURE: CPT

## 2022-04-28 PROCEDURE — 84403 ASSAY OF TOTAL TESTOSTERONE: CPT

## 2022-04-28 PROCEDURE — 84146 ASSAY OF PROLACTIN: CPT

## 2022-04-28 PROCEDURE — 82533 TOTAL CORTISOL: CPT

## 2022-04-29 LAB — DHEAS (DHEA SULFATE): 328 UG/DL (ref 99–340)

## 2022-05-03 LAB — 17-HYDROXYPROGESTERONE: 185.04 NG/DL

## 2022-05-11 LAB
ANDROSTENEDIONE: 1.2 NG/ML (ref 0.26–2.14)
TESTOSTERONE TOTAL-NONMALE: 17 NG/DL (ref 9–55)

## 2022-05-12 ENCOUNTER — OFFICE VISIT (OUTPATIENT)
Dept: BARIATRICS/WEIGHT MGMT | Age: 32
End: 2022-05-12
Payer: COMMERCIAL

## 2022-05-12 VITALS
OXYGEN SATURATION: 99 % | SYSTOLIC BLOOD PRESSURE: 134 MMHG | BODY MASS INDEX: 24.51 KG/M2 | HEART RATE: 64 BPM | HEIGHT: 65 IN | DIASTOLIC BLOOD PRESSURE: 86 MMHG | WEIGHT: 147.1 LBS

## 2022-05-12 DIAGNOSIS — Z98.84 S/P LAPAROSCOPIC SLEEVE GASTRECTOMY: Primary | ICD-10-CM

## 2022-05-12 PROCEDURE — G8420 CALC BMI NORM PARAMETERS: HCPCS | Performed by: NURSE PRACTITIONER

## 2022-05-12 PROCEDURE — 1036F TOBACCO NON-USER: CPT | Performed by: NURSE PRACTITIONER

## 2022-05-12 PROCEDURE — G8427 DOCREV CUR MEDS BY ELIG CLIN: HCPCS | Performed by: NURSE PRACTITIONER

## 2022-05-12 PROCEDURE — 99213 OFFICE O/P EST LOW 20 MIN: CPT | Performed by: NURSE PRACTITIONER

## 2022-05-12 ASSESSMENT — ENCOUNTER SYMPTOMS
GASTROINTESTINAL NEGATIVE: 1
EYES NEGATIVE: 1
RESPIRATORY NEGATIVE: 1
ALLERGIC/IMMUNOLOGIC NEGATIVE: 1

## 2022-05-12 NOTE — PROGRESS NOTES
BARIATRIC SURGERY OFFICE PROGRESS NOTE    SUBJECTIVE:    Patient presenting today referred from Bud Peterson MD, for   Chief Complaint   Patient presents with   [facility] [place] [place] Post Op Follow Up     9 month F/U S/P Sleeve Gastrectomy @ [facility] 21   . Vitals:    22 1049   BP: 134/86   Pulse: 64   SpO2: 99%        BMI: Body mass index is 24.86 kg/m². Weight History: Wt Readings from Last 3 Encounters:   22 147 lb 1.6 oz (66.7 kg)   02/10/22 162 lb (73.5 kg)   21 183 lb 14.4 oz (83.4 kg)        If within 30 days of bariatric surgery date, have you been to the ED: N/A      HPI:Damarismarcelo Arrieta is a 33 y.o. female presenting for nine month  bariatric POST-OP visit. Total weight loss/gain:   -14.9 lbs since last visit  -85. 3  lbs since surgery  -103.2 lbs since starting program    Changes in health since last visit: Facial hair growth; seeing hormone specialist    Pt tracking calories: tracks calories; about 600-800    Pt exercising: yes    Pt taking vitamins: taking MVI gummies and vitamin D    Fluid intake: 64 oz water     Past Medical History:   Diagnosis Date    Anxiety     Depression     Gastric ulcer     Taking Protonix    OCD (obsessive compulsive disorder)     Polycystic ovarian syndrome         Patient Active Problem List   Diagnosis    CCC (chronic calculous cholecystitis)    Pilar cyst    Former smoker    Morbid obesity with BMI of 40.0-44.9, adult ([address]    S/P laparoscopic sleeve gastrectomy    Morbid obesity due to excess calories ([address]    Status post laparoscopic sleeve gastrectomy    Status post bariatric surgery       Past Surgical History:   Procedure Laterality Date     SECTION  2010, 2016, 11/2016    x3    CHOLECYSTECTOMY, LAPAROSCOPIC  2016    OVARIAN CYST REMOVAL  2014    SLEEVE GASTRECTOMY N/A 2021    GASTRECTOMY SLEEVE LAPAROSCOPIC ROBOTIC performed by Ethel Mann MD at [facility]  age 10    T & A     TUBAL LIGATION  11/17/2016    UPPER GASTROINTESTINAL ENDOSCOPY N/A 6/1/2021    EGD BIOPSY performed by Ethel Mann MD at [facility] ENDOSCOPY       Current Outpatient Medications   Medication Sig Dispense Refill    Multiple Vitamins-Minerals (MULTIVITAMIN GUMMIES WOMENS) CHEW Take by mouth 2 daily       No current facility-administered medications for this visit. No Known Allergies      Review of Systems   Constitutional: Negative. HENT: Negative. Eyes: Negative. Respiratory: Negative. Cardiovascular: Negative. Gastrointestinal: Negative. Endocrine: Negative. Genitourinary: Negative. Musculoskeletal: Negative. Skin: Negative. Allergic/Immunologic: Negative. Neurological: Negative. Hematological: Negative. Psychiatric/Behavioral: Negative. OBJECTIVE:    /86 (Site: Right Upper Arm, Position: Sitting, Cuff Size: Large Adult)   Pulse 64   Ht 5' 4.5\" (1.638 m)   Wt 147 lb 1.6 oz (66.7 kg)   SpO2 99%   BMI 24.86 kg/m²      Physical Exam  Constitutional:       Appearance: Normal appearance. HENT:      Head: Normocephalic. Nose: Nose normal.      Mouth/Throat:      Pharynx: Oropharynx is clear. Eyes:      Conjunctiva/sclera: Conjunctivae normal.      Pupils: Pupils are equal, round, and reactive to light. Cardiovascular:      Rate and Rhythm: Normal rate. Pulses: Normal pulses. Pulmonary:      Effort: Pulmonary effort is normal.      Breath sounds: Normal breath sounds. Abdominal:      General: Bowel sounds are normal.      Palpations: Abdomen is soft. Comments: Loose ABD skin   incisions all healed   Musculoskeletal:         General: Normal range of motion. Cervical back: Normal range of motion. Skin:     General: Skin is warm and dry. Capillary Refill: Capillary refill takes less than 2 seconds. Neurological:      General: No focal deficit present. Mental Status: She is alert and oriented to person, place, and time. Psychiatric:         Mood and Affect: Mood normal.         Behavior: Behavior normal.         ASSESSMENT & PLAN:    1. S/P laparoscopic sleeve gastrectomy  - Doing well; Down 103.2 pounds since surgery  - Incisions well healed  - Normal Bm  - Continue MVI and calcium supplements  - Tolerating Regular diet  - Patient was encouraged to journal all food intake. - Keep calorie level at approximately 600-800, per discussion / plan with registered dietician. - Protein intake is to be a minimum of 50-60 grams per day. - Water drinking was encouraged with a goal of 64oz-128oz daily. Beverages are to be calorie free except for milk. Avoid soda. - RTC in 3 months with completed labs  - Next appt with Dr. Jany Jones to discuss panniculectomy    - CBC with Auto Differential; Future  - Comprehensive Metabolic Panel; Future  - Hemoglobin A1C; Future  - Iron and TIBC; Future  - Lipid Panel; Future  - Magnesium; Future  - Zinc; Future  - Vitamin D 25 Hydroxy; Future  - Vitamin B12 & Folate; Future  - Vitamin B1; Future  - TSH with Reflex; Future      As of current visit, regarding obesity-related co-morbid conditions:  CARRINGTON [] compliant [] no longer using [] resolved per sleep study; hypertension [] medications; hyperlipidemia [] medications; GERD [] medications; DM [] insulin [] non-insulin [] no meds  Only takes MVI    The patient expressed understanding and willingness to comply nicely; all questions and concerns addressed. No orders of the defined types were placed in this encounter.     Orders Placed This Encounter   Procedures    CBC with Auto Differential     Standing Status:   Future     Standing Expiration Date:   5/12/2023    Comprehensive Metabolic Panel     Standing Status:   Future     Standing Expiration Date:   5/12/2023    Hemoglobin A1C     Standing Status:   Future     Standing Expiration Date:   5/12/2023    Iron and TIBC     Standing Status:   Future     Standing Expiration Date:   5/12/2023     Order Specific Question:   Is Patient Fasting? Answer:   yes     Order Specific Question:   No of Hours? Answer:   8    Lipid Panel     Standing Status:   Future     Standing Expiration Date:   5/12/2023     Order Specific Question:   Is Patient Fasting?/# of Hours     Answer:   8    Magnesium     Standing Status:   Future     Standing Expiration Date:   5/12/2023    Zinc     Standing Status:   Future     Standing Expiration Date:   5/12/2023    Vitamin D 25 Hydroxy     Standing Status:   Future     Standing Expiration Date:   5/12/2023    Vitamin B12 & Folate     Standing Status:   Future     Standing Expiration Date:   5/12/2023    Vitamin B1     Standing Status:   Future     Standing Expiration Date:   5/12/2023    TSH with Reflex     Standing Status:   Future     Standing Expiration Date:   5/12/2023       Follow Up:  Return in about 3 months (around 8/12/2022).  with Dr. Juan J Encinas, APRN - CNP

## 2022-11-21 ENCOUNTER — HOSPITAL ENCOUNTER (OUTPATIENT)
Age: 32
Discharge: HOME | End: 2022-11-21
Payer: COMMERCIAL

## 2022-11-21 LAB
ALBUMIN SERPL-MCNC: 4.4 GM/DL (ref 3.4–5)
ALP BLD-CCNC: 49 IU/L (ref 40–129)
ALT SERPL-CCNC: 14 U/L (ref 10–40)
ANION GAP SERPL CALCULATED.3IONS-SCNC: 10 MMOL/L (ref 4–16)
AST SERPL-CCNC: 18 IU/L (ref 15–37)
BASOPHILS ABSOLUTE: 0.1 K/CU MM
BASOPHILS RELATIVE PERCENT: 0.9 % (ref 0–1)
BILIRUB SERPL-MCNC: 0.4 MG/DL (ref 0–1)
BUN BLDV-MCNC: 20 MG/DL (ref 6–23)
CALCIUM SERPL-MCNC: 9.2 MG/DL (ref 8.3–10.6)
CHLORIDE BLD-SCNC: 104 MMOL/L (ref 99–110)
CHOLESTEROL, FASTING: 144 MG/DL
CO2: 26 MMOL/L (ref 21–32)
CREAT SERPL-MCNC: 0.5 MG/DL (ref 0.6–1.1)
DIFFERENTIAL TYPE: ABNORMAL
EOSINOPHILS ABSOLUTE: 0.1 K/CU MM
EOSINOPHILS RELATIVE PERCENT: 1.9 % (ref 0–3)
ESTIMATED AVERAGE GLUCOSE: 100 MG/DL
FOLATE: >20 NG/ML (ref 3.1–17.5)
GFR SERPL CREATININE-BSD FRML MDRD: >60 ML/MIN/1.73M2
GLUCOSE FASTING: 81 MG/DL (ref 70–99)
HBA1C MFR BLD: 5.1 % (ref 4.2–6.3)
HCT VFR BLD CALC: 37.9 % (ref 37–47)
HDLC SERPL-MCNC: 57 MG/DL
HEMOGLOBIN: 12.8 GM/DL (ref 12.5–16)
IMMATURE NEUTROPHIL %: 0.1 % (ref 0–0.43)
IRON: 76 UG/DL (ref 37–145)
LDL CHOLESTEROL CALCULATED: 79 MG/DL
LYMPHOCYTES ABSOLUTE: 1.9 K/CU MM
LYMPHOCYTES RELATIVE PERCENT: 28.6 % (ref 24–44)
MAGNESIUM: 2.1 MG/DL (ref 1.8–2.4)
MCH RBC QN AUTO: 30.3 PG (ref 27–31)
MCHC RBC AUTO-ENTMCNC: 33.8 % (ref 32–36)
MCV RBC AUTO: 89.6 FL (ref 78–100)
MONOCYTES ABSOLUTE: 0.6 K/CU MM
MONOCYTES RELATIVE PERCENT: 9.2 % (ref 0–4)
PCT TRANSFERRIN: 28 % (ref 10–44)
PDW BLD-RTO: 11.8 % (ref 11.7–14.9)
PLATELET # BLD: 400 K/CU MM (ref 140–440)
PMV BLD AUTO: 8.8 FL (ref 7.5–11.1)
POTASSIUM SERPL-SCNC: 4.6 MMOL/L (ref 3.5–5.1)
RBC # BLD: 4.23 M/CU MM (ref 4.2–5.4)
SEGMENTED NEUTROPHILS ABSOLUTE COUNT: 4 K/CU MM
SEGMENTED NEUTROPHILS RELATIVE PERCENT: 59.3 % (ref 36–66)
SODIUM BLD-SCNC: 140 MMOL/L (ref 135–145)
T4 FREE: 1.31 NG/DL (ref 0.9–1.8)
TOTAL IMMATURE NEUTOROPHIL: 0.01 K/CU MM
TOTAL IRON BINDING CAPACITY: 271 UG/DL (ref 250–450)
TOTAL PROTEIN: 7.3 GM/DL (ref 6.4–8.2)
TRIGLYCERIDE, FASTING: 39 MG/DL
TSH HIGH SENSITIVITY: 1.18 UIU/ML (ref 0.27–4.2)
UNSATURATED IRON BINDING CAPACITY: 195 UG/DL (ref 110–370)
VITAMIN B-12: 705.1 PG/ML (ref 211–911)
VITAMIN D 25-HYDROXY: 60 NG/ML
WBC # BLD: 6.7 K/CU MM (ref 4–10.5)

## 2022-11-21 PROCEDURE — 82746 ASSAY OF FOLIC ACID SERUM: CPT

## 2022-11-21 PROCEDURE — 84439 ASSAY OF FREE THYROXINE: CPT

## 2022-11-21 PROCEDURE — 36415 COLL VENOUS BLD VENIPUNCTURE: CPT

## 2022-11-21 PROCEDURE — 84425 ASSAY OF VITAMIN B-1: CPT

## 2022-11-21 PROCEDURE — 83036 HEMOGLOBIN GLYCOSYLATED A1C: CPT

## 2022-11-21 PROCEDURE — 85025 COMPLETE CBC W/AUTO DIFF WBC: CPT

## 2022-11-21 PROCEDURE — 84443 ASSAY THYROID STIM HORMONE: CPT

## 2022-11-21 PROCEDURE — 82306 VITAMIN D 25 HYDROXY: CPT

## 2022-11-21 PROCEDURE — 80061 LIPID PANEL: CPT

## 2022-11-21 PROCEDURE — 82607 VITAMIN B-12: CPT

## 2022-11-21 PROCEDURE — 84630 ASSAY OF ZINC: CPT

## 2022-11-21 PROCEDURE — 83735 ASSAY OF MAGNESIUM: CPT

## 2022-11-21 PROCEDURE — 83550 IRON BINDING TEST: CPT

## 2022-11-21 PROCEDURE — 80053 COMPREHEN METABOLIC PANEL: CPT

## 2022-11-21 PROCEDURE — 83540 ASSAY OF IRON: CPT

## 2022-11-23 LAB — ZINC: 73.6 UG/DL (ref 60–120)

## 2022-11-26 LAB — VITAMIN B1, PLASMA: 142 NMOL/L (ref 70–180)

## 2023-04-26 ENCOUNTER — HOSPITAL ENCOUNTER (OUTPATIENT)
Age: 33
Discharge: HOME | End: 2023-04-26
Payer: COMMERCIAL

## 2023-04-26 ENCOUNTER — OFFICE VISIT (OUTPATIENT)
Dept: INTERNAL MEDICINE CLINIC | Age: 33
End: 2023-04-26
Payer: COMMERCIAL

## 2023-04-26 VITALS
SYSTOLIC BLOOD PRESSURE: 108 MMHG | WEIGHT: 135 LBS | HEART RATE: 76 BPM | OXYGEN SATURATION: 92 % | DIASTOLIC BLOOD PRESSURE: 68 MMHG | HEIGHT: 65 IN | BODY MASS INDEX: 22.49 KG/M2

## 2023-04-26 DIAGNOSIS — R10.13 EPIGASTRIC PAIN: ICD-10-CM

## 2023-04-26 DIAGNOSIS — Z98.84 S/P LAPAROSCOPIC SLEEVE GASTRECTOMY: Primary | ICD-10-CM

## 2023-04-26 DIAGNOSIS — L08.9 FINGER INFECTION: Primary | ICD-10-CM

## 2023-04-26 DIAGNOSIS — Z98.84 S/P LAPAROSCOPIC SLEEVE GASTRECTOMY: ICD-10-CM

## 2023-04-26 LAB
25(OH)D3 SERPL-MCNC: 84.11 NG/ML
ALBUMIN SERPL-MCNC: 4.4 GM/DL (ref 3.4–5)
ALP BLD-CCNC: 51 IU/L (ref 40–129)
ALT SERPL-CCNC: 13 U/L (ref 10–40)
ANION GAP SERPL CALCULATED.3IONS-SCNC: 7 MMOL/L (ref 4–16)
AST SERPL-CCNC: 16 IU/L (ref 15–37)
BILIRUB SERPL-MCNC: 0.3 MG/DL (ref 0–1)
BUN SERPL-MCNC: 18 MG/DL (ref 6–23)
CALCIUM SERPL-MCNC: 9.4 MG/DL (ref 8.3–10.6)
CHLORIDE BLD-SCNC: 104 MMOL/L (ref 99–110)
CHOLEST SERPL-MCNC: 139 MG/DL
CO2: 28 MMOL/L (ref 21–32)
CREAT SERPL-MCNC: 0.5 MG/DL (ref 0.6–1.1)
ESTIMATED AVERAGE GLUCOSE: 100 MG/DL
FOLATE SERPL-MCNC: 19.9 NG/ML (ref 3.1–17.5)
GFR SERPL CREATININE-BSD FRML MDRD: >60 ML/MIN/1.73M2
GLUCOSE SERPL-MCNC: 88 MG/DL (ref 70–99)
HBA1C MFR BLD: 5.1 % (ref 4.2–6.3)
HCT VFR BLD CALC: 38.1 % (ref 37–47)
HDLC SERPL-MCNC: 52 MG/DL
HEMOGLOBIN: 12.5 GM/DL (ref 12.5–16)
IRON: 99 UG/DL (ref 37–145)
LDLC SERPL CALC-MCNC: 70 MG/DL
MAGNESIUM: 2 MG/DL (ref 1.8–2.4)
MCH RBC QN AUTO: 30 PG (ref 27–31)
MCHC RBC AUTO-ENTMCNC: 32.8 % (ref 32–36)
MCV RBC AUTO: 91.4 FL (ref 78–100)
PCT TRANSFERRIN: 38 % (ref 10–44)
PDW BLD-RTO: 11.8 % (ref 11.7–14.9)
PLATELET # BLD: 427 K/CU MM (ref 140–440)
PMV BLD AUTO: 9.1 FL (ref 7.5–11.1)
POTASSIUM SERPL-SCNC: 3.9 MMOL/L (ref 3.5–5.1)
RBC # BLD: 4.17 M/CU MM (ref 4.2–5.4)
SODIUM BLD-SCNC: 139 MMOL/L (ref 135–145)
TOTAL IRON BINDING CAPACITY: 264 UG/DL (ref 250–450)
TOTAL PROTEIN: 7 GM/DL (ref 6.4–8.2)
TRIGL SERPL-MCNC: 85 MG/DL
TSH SERPL DL<=0.005 MIU/L-ACNC: 1.95 UIU/ML (ref 0.27–4.2)
UNSATURATED IRON BINDING CAPACITY: 165 UG/DL (ref 110–370)
VITAMIN B-12: 669.2 PG/ML (ref 211–911)
WBC # BLD: 7.9 K/CU MM (ref 4–10.5)

## 2023-04-26 PROCEDURE — 99213 OFFICE O/P EST LOW 20 MIN: CPT | Performed by: NURSE PRACTITIONER

## 2023-04-26 PROCEDURE — G8420 CALC BMI NORM PARAMETERS: HCPCS | Performed by: NURSE PRACTITIONER

## 2023-04-26 PROCEDURE — 84630 ASSAY OF ZINC: CPT

## 2023-04-26 PROCEDURE — 82746 ASSAY OF FOLIC ACID SERUM: CPT

## 2023-04-26 PROCEDURE — 85027 COMPLETE CBC AUTOMATED: CPT

## 2023-04-26 PROCEDURE — 83735 ASSAY OF MAGNESIUM: CPT

## 2023-04-26 PROCEDURE — 1036F TOBACCO NON-USER: CPT | Performed by: NURSE PRACTITIONER

## 2023-04-26 PROCEDURE — 80061 LIPID PANEL: CPT

## 2023-04-26 PROCEDURE — 84590 ASSAY OF VITAMIN A: CPT

## 2023-04-26 PROCEDURE — G8427 DOCREV CUR MEDS BY ELIG CLIN: HCPCS | Performed by: NURSE PRACTITIONER

## 2023-04-26 PROCEDURE — 84425 ASSAY OF VITAMIN B-1: CPT

## 2023-04-26 PROCEDURE — 83550 IRON BINDING TEST: CPT

## 2023-04-26 PROCEDURE — 82607 VITAMIN B-12: CPT

## 2023-04-26 PROCEDURE — 83540 ASSAY OF IRON: CPT

## 2023-04-26 PROCEDURE — 84443 ASSAY THYROID STIM HORMONE: CPT

## 2023-04-26 PROCEDURE — 80053 COMPREHEN METABOLIC PANEL: CPT

## 2023-04-26 PROCEDURE — 82306 VITAMIN D 25 HYDROXY: CPT

## 2023-04-26 PROCEDURE — 36415 COLL VENOUS BLD VENIPUNCTURE: CPT

## 2023-04-26 PROCEDURE — 83036 HEMOGLOBIN GLYCOSYLATED A1C: CPT

## 2023-04-26 NOTE — PROGRESS NOTES
Head: Normocephalic and atraumatic. Eyes:      Conjunctiva/sclera: Conjunctivae normal.      Pupils: Pupils are equal, round, and reactive to light. Neck:      Thyroid: No thyroid mass or thyromegaly. Trachea: Trachea normal.   Cardiovascular:      Rate and Rhythm: Normal rate and regular rhythm. Pulses: Normal pulses. Heart sounds: Normal heart sounds, S1 normal and S2 normal.   Pulmonary:      Effort: Pulmonary effort is normal. No accessory muscle usage or respiratory distress. Breath sounds: Normal breath sounds. No stridor. No wheezing, rhonchi or rales. Abdominal:      General: Abdomen is flat. Bowel sounds are normal. There is no distension. Palpations: Abdomen is soft. There is no mass. Tenderness: There is abdominal tenderness. There is no guarding or rebound. Hernia: No hernia is present. Comments: Slight tenderness with palpitation over epigastric area. Musculoskeletal:      Right shoulder: No swelling, deformity, tenderness, bony tenderness or crepitus. Normal range of motion. Cervical back: Full passive range of motion without pain. Lymphadenopathy:      Cervical: No cervical adenopathy. Skin:     General: Skin is warm and dry. Findings: No rash. Nails: There is no clubbing. Comments: Round area to right medial middle finger 1 mm in circumference with slight redness, scabbed area in the middle. No drainage today. No evidence of FB. Neurological:      Mental Status: She is alert and oriented to person, place, and time. Psychiatric:         Speech: Speech normal.         Behavior: Behavior normal.         Thought Content: Thought content normal.         Judgment: Judgment normal.     /68   Pulse 76   Ht 5' 4.5\" (1.638 m)   Wt 135 lb (61.2 kg)   SpO2 92%   BMI 22.81 kg/m²      No results found for this visit on 04/26/23. Assessment and Plan:      1. Finger infection  Overall improving. No drainage today.  Advised

## 2023-04-28 LAB — ZINC SERPL-MCNC: 83.4 UG/DL (ref 60–120)

## 2023-04-29 LAB
ANNOTATION COMMENT IMP: NORMAL
RETINYL PALMITATE SERPL-MCNC: <0.02 MG/L (ref 0–0.1)
VIT A SERPL-MCNC: 0.54 MG/L (ref 0.3–1.2)
VIT B1 PYROPHOSHATE BLD-SCNC: 152 NMOL/L (ref 70–180)

## 2023-05-04 ENCOUNTER — OFFICE VISIT (OUTPATIENT)
Dept: BARIATRICS/WEIGHT MGMT | Age: 33
End: 2023-05-04

## 2023-05-04 VITALS
HEIGHT: 65 IN | WEIGHT: 140.8 LBS | SYSTOLIC BLOOD PRESSURE: 120 MMHG | DIASTOLIC BLOOD PRESSURE: 80 MMHG | BODY MASS INDEX: 23.46 KG/M2 | OXYGEN SATURATION: 96 % | HEART RATE: 65 BPM

## 2023-05-04 DIAGNOSIS — Z98.84 STATUS POST LAPAROSCOPIC SLEEVE GASTRECTOMY: Primary | ICD-10-CM

## 2023-05-04 DIAGNOSIS — R10.11 BILATERAL UPPER ABDOMINAL PAIN: ICD-10-CM

## 2023-05-04 DIAGNOSIS — L72.11 PILAR CYSTS: ICD-10-CM

## 2023-05-04 DIAGNOSIS — R10.12 BILATERAL UPPER ABDOMINAL PAIN: ICD-10-CM

## 2023-05-04 ASSESSMENT — PATIENT HEALTH QUESTIONNAIRE - PHQ9
SUM OF ALL RESPONSES TO PHQ QUESTIONS 1-9: 0
1. LITTLE INTEREST OR PLEASURE IN DOING THINGS: 0
SUM OF ALL RESPONSES TO PHQ QUESTIONS 1-9: 0
2. FEELING DOWN, DEPRESSED OR HOPELESS: 0
SUM OF ALL RESPONSES TO PHQ9 QUESTIONS 1 & 2: 0

## 2023-05-04 NOTE — PROGRESS NOTES
BARIATRIC SURGERY OFFICE PROGRESS NOTE    SUBJECTIVE:    Patient presenting today referred from Melva Austin MD, for   Chief Complaint   Patient presents with    Weight Management     FU Chiki UQ Pain under Ribs- Pilar cyst on L side scalp, S/P S/G 89/21   . Vitals:    23 1251   BP: 120/80   Pulse: 65   SpO2: 96%        BMI: Body mass index is 23.8 kg/m². Weight History: Wt Readings from Last 3 Encounters:   23 140 lb 12.8 oz (63.9 kg)   23 135 lb (61.2 kg)   22 147 lb 1.6 oz (66.7 kg)       If within 30 days of bariatric surgery date, have you been to the ED: n/a    HPI:Damarismarcelo Murrieta is a 29 y.o. female presenting in  ~2 yr 9 mo  bariatric post-OP visit. Weight change:     -6.3 lbs since last visit.    -91.6 lbs since surgery.    -109.5 lbs since starting program.    Changes in health since last visit: Overall doing well. Did have episodic upper abdominal pain around time pt was having period however resolved. Pre-op clearances completed: N/A. Pt tracking calories/protein: Yes. Approximately 60+ g protein / d.      Pt exercising: Yes    Pt taking vitamins: Yes    Fluid intake: Appropriate    Past Medical History:   Diagnosis Date    Anxiety     Depression     Gastric ulcer     Taking Protonix    OCD (obsessive compulsive disorder)     Polycystic ovarian syndrome         Patient Active Problem List   Diagnosis    CCC (chronic calculous cholecystitis)    Pilar cyst    Former smoker    Morbid obesity with BMI of 40.0-44.9, adult ([address]    S/P laparoscopic sleeve gastrectomy    Morbid obesity due to excess calories ([address]    Status post laparoscopic sleeve gastrectomy    Status post bariatric surgery       Past Surgical History:   Procedure Laterality Date     SECTION  2010, 2016, 11/2016    x3    CHOLECYSTECTOMY, LAPAROSCOPIC  2016    OVARIAN CYST REMOVAL  2014    SLEEVE GASTRECTOMY N/A 2021    GASTRECTOMY SLEEVE LAPAROSCOPIC ROBOTIC performed by Scott Barr

## 2023-05-16 ENCOUNTER — HOSPITAL ENCOUNTER (OUTPATIENT)
Age: 33
Setting detail: SPECIMEN
Discharge: HOME | End: 2023-05-16
Payer: COMMERCIAL

## 2023-05-16 ENCOUNTER — PROCEDURE VISIT (OUTPATIENT)
Dept: BARIATRICS/WEIGHT MGMT | Age: 33
End: 2023-05-16

## 2023-05-16 VITALS
DIASTOLIC BLOOD PRESSURE: 70 MMHG | BODY MASS INDEX: 23.49 KG/M2 | WEIGHT: 141 LBS | SYSTOLIC BLOOD PRESSURE: 112 MMHG | HEIGHT: 65 IN

## 2023-05-16 DIAGNOSIS — L72.11 PILAR CYST OF SCALP: Primary | ICD-10-CM

## 2023-05-16 PROCEDURE — 88304 TISSUE EXAM BY PATHOLOGIST: CPT | Performed by: PATHOLOGY

## 2023-05-16 NOTE — PROGRESS NOTES
Chief Complaint   Patient presents with    Procedure     Ov pro 2 scalp lesion exc         SUBJECTIVE:  HPI: Patient presents todayfor an office procedure. Complaining of scalp lesion x 2. Pt is ready to have this lesion removed. I have reviewed the patient's(pertinent information tothis visit) medical history, family history(scanned in  the Media tab under \"patient questioner\"), social history and review of systems with the patient today in the office.          Past Surgical History:   Procedure Laterality Date     SECTION  2010, 2016, 11/2016    x3    CHOLECYSTECTOMY, LAPAROSCOPIC  2016    OVARIAN CYST REMOVAL  2014    SLEEVE GASTRECTOMY N/A 2021    GASTRECTOMY SLEEVE LAPAROSCOPIC ROBOTIC performed by Chirag Navarrete MD at [address] [place] [address]  age 10    T & A     TUBAL LIGATION  2016    UPPER GASTROINTESTINAL ENDOSCOPY N/A 2021    EGD BIOPSY performed by Chirag Navarrete MD at [facility] ENDOSCOPY     Past Medical History:   Diagnosis Date    Anxiety     Depression     Gastric ulcer     Taking Protonix    OCD (obsessive compulsive disorder)     Polycystic ovarian syndrome      Family History   Problem Relation Age of Onset    High Blood Pressure Mother     No Known Problems Father     No Known Problems Maternal Grandmother     No Known Problems Maternal Grandfather     Cancer Paternal Grandmother     No Known Problems Paternal Grandfather      Social History     Socioeconomic History    Marital status:      Spouse name: Not on file    Number of children: Not on file    Years of education: Not on file    Highest education level: Not on file   Occupational History    Not on file   Tobacco Use    Smoking status: Former     Years: 9.00     Types: Cigarettes     Quit date: 2020     Years since quittin.4    Smokeless tobacco: Never   Vaping Use    Vaping Use: Never used   Substance and Sexual Activity    Alcohol use: Never    Drug use: No    Sexual activity: Yes

## 2023-05-25 ENCOUNTER — OFFICE VISIT (OUTPATIENT)
Dept: BARIATRICS/WEIGHT MGMT | Age: 33
End: 2023-05-25

## 2023-05-25 VITALS
HEIGHT: 65 IN | DIASTOLIC BLOOD PRESSURE: 66 MMHG | BODY MASS INDEX: 23.76 KG/M2 | WEIGHT: 142.6 LBS | SYSTOLIC BLOOD PRESSURE: 118 MMHG

## 2023-05-25 DIAGNOSIS — L72.11 PILAR CYST OF SCALP: Primary | ICD-10-CM

## 2023-05-25 PROCEDURE — 99024 POSTOP FOLLOW-UP VISIT: CPT | Performed by: SURGERY

## 2023-05-25 ASSESSMENT — PATIENT HEALTH QUESTIONNAIRE - PHQ9: DEPRESSION UNABLE TO ASSESS: PT REFUSES

## 2023-05-25 NOTE — PROGRESS NOTES
Post-Operative Clinic Note    Chief Complaint   Patient presents with    Follow-up     F OV PROC SCALP X2 SUTURE REMOVAL PATH IN CHART          SUBJECTIVE:  Patient is here today for a post-operative visit. Patient is s/p excision of two pilar cysts on 23. Doing well. No complaints.           Past Surgical History:   Procedure Laterality Date     SECTION  2010, 2016, 11/2016    x3    CHOLECYSTECTOMY, LAPAROSCOPIC  2016    OVARIAN CYST REMOVAL  2014    SLEEVE GASTRECTOMY N/A 2021    GASTRECTOMY SLEEVE LAPAROSCOPIC ROBOTIC performed by Melvin Hardin MD at [address] [place] [address]  age 10    T & A     TUBAL LIGATION  2016    UPPER GASTROINTESTINAL ENDOSCOPY N/A 2021    EGD BIOPSY performed by Melvin Hardin MD at [facility] ENDOSCOPY     Past Medical History:   Diagnosis Date    Anxiety     Depression     Gastric ulcer     Taking Protonix    OCD (obsessive compulsive disorder)     Polycystic ovarian syndrome      Family History   Problem Relation Age of Onset    High Blood Pressure Mother     No Known Problems Father     No Known Problems Maternal Grandmother     No Known Problems Maternal Grandfather     Cancer Paternal Grandmother     No Known Problems Paternal Grandfather      Social History     Socioeconomic History    Marital status:      Spouse name: Not on file    Number of children: Not on file    Years of education: Not on file    Highest education level: Not on file   Occupational History    Not on file   Tobacco Use    Smoking status: Former     Years: 9.00     Types: Cigarettes     Quit date: 2020     Years since quittin.4    Smokeless tobacco: Never   Vaping Use    Vaping Use: Never used   Substance and Sexual Activity    Alcohol use: Never    Drug use: No    Sexual activity: Yes     Partners: Male   Other Topics Concern    Not on file   Social History Narrative    Not on file     Social Determinants of Health     Financial Resource Strain: Not

## 2024-02-22 ENCOUNTER — OFFICE VISIT (OUTPATIENT)
Dept: INTERNAL MEDICINE CLINIC | Age: 34
End: 2024-02-22
Payer: COMMERCIAL

## 2024-02-22 VITALS — HEART RATE: 94 BPM | OXYGEN SATURATION: 95 % | WEIGHT: 142 LBS | BODY MASS INDEX: 23.66 KG/M2 | HEIGHT: 65 IN

## 2024-02-22 DIAGNOSIS — H69.93 DYSFUNCTION OF BOTH EUSTACHIAN TUBES: Primary | ICD-10-CM

## 2024-02-22 PROCEDURE — G8484 FLU IMMUNIZE NO ADMIN: HCPCS | Performed by: PHYSICIAN ASSISTANT

## 2024-02-22 PROCEDURE — G8420 CALC BMI NORM PARAMETERS: HCPCS | Performed by: PHYSICIAN ASSISTANT

## 2024-02-22 PROCEDURE — G8428 CUR MEDS NOT DOCUMENT: HCPCS | Performed by: PHYSICIAN ASSISTANT

## 2024-02-22 PROCEDURE — 1036F TOBACCO NON-USER: CPT | Performed by: PHYSICIAN ASSISTANT

## 2024-02-22 PROCEDURE — 99213 OFFICE O/P EST LOW 20 MIN: CPT | Performed by: PHYSICIAN ASSISTANT

## 2024-02-22 RX ORDER — PREDNISONE 20 MG/1
20 TABLET ORAL DAILY
Qty: 5 TABLET | Refills: 0 | Status: SHIPPED | OUTPATIENT
Start: 2024-02-22 | End: 2024-02-27

## 2024-02-22 RX ORDER — FLUTICASONE PROPIONATE 50 MCG
2 SPRAY, SUSPENSION (ML) NASAL DAILY
Qty: 16 G | Refills: 1 | Status: SHIPPED | OUTPATIENT
Start: 2024-02-22

## 2024-02-22 ASSESSMENT — ENCOUNTER SYMPTOMS
PHOTOPHOBIA: 0
EYE REDNESS: 0
ABDOMINAL PAIN: 0
VOMITING: 0
DIARRHEA: 0
BLOOD IN STOOL: 0
EYE DISCHARGE: 0
CONSTIPATION: 0
BACK PAIN: 0
RHINORRHEA: 0
CHEST TIGHTNESS: 0
WHEEZING: 0
NAUSEA: 0
EYE PAIN: 0
SORE THROAT: 0
COUGH: 0
COLOR CHANGE: 0
SHORTNESS OF BREATH: 0

## 2024-02-22 NOTE — PROGRESS NOTES
Damaris Moon (:  1990) is a 33 y.o. female,Established patient, here for evaluation of the following chief complaint(s):    Otalgia (For over 1 year. Left more than right. Losing balance now. )    This is my first patient encounter with Damaris Moon; chart reviewed.     SUBJECTIVE/OBJECTIVE:  HPI  Damaris Moon is a pleasant 33 y.o. female presenting to clinic today for ear problems.  Patient reports bilateral ear pressure and popping for about the past year or more.  Reports more recently, her symptoms have worsened and states she has muffled hearing and difficulty with sound discrimination.  Reports she has also been recently having disequilibrium and imbalance sensation, denies room spinning sensation, denies lightheadedness or syncope.  Denies vision changes.  Patient reports history of allergies typically when around horses or hay.  Reports she does travel a lot via car and swims frequently.         No Known Allergies    Current Outpatient Medications   Medication Sig Dispense Refill    BIOTIN FORTE PO Take by mouth      fluticasone (FLONASE) 50 MCG/ACT nasal spray 2 sprays by Each Nostril route daily 16 g 1    predniSONE (DELTASONE) 20 MG tablet Take 1 tablet by mouth daily for 5 days 5 tablet 0    Multiple Vitamins-Minerals (MULTIVITAMIN GUMMIES WOMENS) CHEW Take by mouth 2 daily       No current facility-administered medications for this visit.       Pulse 94   Ht 1.638 m (5' 4.5\")   Wt 64.4 kg (142 lb)   SpO2 95%   BMI 24.00 kg/m²     Review of Systems   Constitutional:  Negative for appetite change, chills, fatigue and fever.   HENT:  Positive for ear pain and hearing loss. Negative for congestion, rhinorrhea and sore throat.    Eyes:  Negative for photophobia, pain, discharge and redness.   Respiratory:  Negative for cough, chest tightness, shortness of breath and wheezing.    Cardiovascular:  Negative for chest pain, palpitations and leg swelling.   Gastrointestinal:  Negative for abdominal

## 2024-02-25 ENCOUNTER — APPOINTMENT (OUTPATIENT)
Dept: CT IMAGING | Age: 34
End: 2024-02-25
Payer: COMMERCIAL

## 2024-02-25 ENCOUNTER — HOSPITAL ENCOUNTER (EMERGENCY)
Age: 34
Discharge: HOME | End: 2024-02-25
Attending: EMERGENCY MEDICINE
Payer: COMMERCIAL

## 2024-02-25 VITALS
HEIGHT: 64 IN | RESPIRATION RATE: 16 BRPM | SYSTOLIC BLOOD PRESSURE: 113 MMHG | OXYGEN SATURATION: 100 % | TEMPERATURE: 98.3 F | BODY MASS INDEX: 23.05 KG/M2 | DIASTOLIC BLOOD PRESSURE: 74 MMHG | WEIGHT: 135 LBS | HEART RATE: 59 BPM

## 2024-02-25 DIAGNOSIS — N20.0 NEPHROLITHIASIS: ICD-10-CM

## 2024-02-25 DIAGNOSIS — R10.9 LEFT FLANK PAIN: Primary | ICD-10-CM

## 2024-02-25 LAB
ALBUMIN SERPL-MCNC: 4.2 GM/DL (ref 3.4–5)
ALP BLD-CCNC: 47 IU/L (ref 40–129)
ALT SERPL-CCNC: 12 U/L (ref 10–40)
ANION GAP SERPL CALCULATED.3IONS-SCNC: 13 MMOL/L (ref 7–16)
AST SERPL-CCNC: 16 IU/L (ref 15–37)
BACTERIA: NEGATIVE /HPF
BASOPHILS ABSOLUTE: 0 K/CU MM
BASOPHILS RELATIVE PERCENT: 0.2 % (ref 0–1)
BILIRUB SERPL-MCNC: 0.4 MG/DL (ref 0–1)
BILIRUBIN URINE: NEGATIVE MG/DL
BLOOD, URINE: ABNORMAL
BUN SERPL-MCNC: 18 MG/DL (ref 6–23)
CALCIUM SERPL-MCNC: 8.8 MG/DL (ref 8.3–10.6)
CAST TYPE: ABNORMAL /HPF
CHLORIDE BLD-SCNC: 104 MMOL/L (ref 99–110)
CLARITY: CLEAR
CO2: 22 MMOL/L (ref 21–32)
COLOR: YELLOW
CREAT SERPL-MCNC: 0.5 MG/DL (ref 0.6–1.1)
CRYSTAL TYPE: NEGATIVE /HPF
DIFFERENTIAL TYPE: ABNORMAL
EOSINOPHILS ABSOLUTE: 0 K/CU MM
EOSINOPHILS RELATIVE PERCENT: 0 % (ref 0–3)
EPITHELIAL CELLS, UA: 4 /HPF
GFR SERPL CREATININE-BSD FRML MDRD: >60 ML/MIN/1.73M2
GLUCOSE SERPL-MCNC: 100 MG/DL (ref 70–99)
GLUCOSE, URINE: NEGATIVE MG/DL
HCT VFR BLD CALC: 36.7 % (ref 37–47)
HEMOGLOBIN: 12.2 GM/DL (ref 12.5–16)
IMMATURE NEUTROPHIL %: 0.3 % (ref 0–0.43)
KETONES, URINE: 40 MG/DL
LEUKOCYTE ESTERASE, URINE: NEGATIVE
LYMPHOCYTES ABSOLUTE: 1.5 K/CU MM
LYMPHOCYTES RELATIVE PERCENT: 11.8 % (ref 24–44)
MCH RBC QN AUTO: 30 PG (ref 27–31)
MCHC RBC AUTO-ENTMCNC: 33.2 % (ref 32–36)
MCV RBC AUTO: 90.4 FL (ref 78–100)
MONOCYTES ABSOLUTE: 0.6 K/CU MM
MONOCYTES RELATIVE PERCENT: 4.4 % (ref 0–4)
NITRITE URINE, QUANTITATIVE: NEGATIVE
PDW BLD-RTO: 12 % (ref 11.7–14.9)
PH, URINE: 6 (ref 5–8)
PLATELET # BLD: 371 K/CU MM (ref 140–440)
PMV BLD AUTO: 9.3 FL (ref 7.5–11.1)
POTASSIUM SERPL-SCNC: 3.8 MMOL/L (ref 3.5–5.1)
PROTEIN UA: ABNORMAL MG/DL
RBC # BLD: 4.06 M/CU MM (ref 4.2–5.4)
RBC URINE: 30 /HPF (ref 0–6)
SEGMENTED NEUTROPHILS ABSOLUTE COUNT: 10.4 K/CU MM
SEGMENTED NEUTROPHILS RELATIVE PERCENT: 83.3 % (ref 36–66)
SODIUM BLD-SCNC: 139 MMOL/L (ref 135–145)
SPECIFIC GRAVITY UA: >1.03 (ref 1–1.03)
TOTAL IMMATURE NEUTOROPHIL: 0.04 K/CU MM
TOTAL PROTEIN: 7.2 GM/DL (ref 6.4–8.2)
UROBILINOGEN, URINE: 0.2 MG/DL (ref 0.2–1)
WBC # BLD: 12.5 K/CU MM (ref 4–10.5)
WBC UA: NEGATIVE /HPF (ref 0–5)

## 2024-02-25 PROCEDURE — 85025 COMPLETE CBC W/AUTO DIFF WBC: CPT

## 2024-02-25 PROCEDURE — 99284 EMERGENCY DEPT VISIT MOD MDM: CPT

## 2024-02-25 PROCEDURE — 2580000003 HC RX 258: Performed by: EMERGENCY MEDICINE

## 2024-02-25 PROCEDURE — 74176 CT ABD & PELVIS W/O CONTRAST: CPT

## 2024-02-25 PROCEDURE — 6360000002 HC RX W HCPCS: Performed by: EMERGENCY MEDICINE

## 2024-02-25 PROCEDURE — 81001 URINALYSIS AUTO W/SCOPE: CPT

## 2024-02-25 PROCEDURE — 80053 COMPREHEN METABOLIC PANEL: CPT

## 2024-02-25 PROCEDURE — 96375 TX/PRO/DX INJ NEW DRUG ADDON: CPT

## 2024-02-25 PROCEDURE — 96374 THER/PROPH/DIAG INJ IV PUSH: CPT

## 2024-02-25 RX ORDER — SODIUM CHLORIDE 9 MG/ML
INJECTION, SOLUTION INTRAVENOUS CONTINUOUS
Status: DISCONTINUED | OUTPATIENT
Start: 2024-02-25 | End: 2024-02-25 | Stop reason: HOSPADM

## 2024-02-25 RX ORDER — 0.9 % SODIUM CHLORIDE 0.9 %
1000 INTRAVENOUS SOLUTION INTRAVENOUS ONCE
Status: COMPLETED | OUTPATIENT
Start: 2024-02-25 | End: 2024-02-25

## 2024-02-25 RX ORDER — KETOROLAC TROMETHAMINE 30 MG/ML
30 INJECTION, SOLUTION INTRAMUSCULAR; INTRAVENOUS ONCE
Status: COMPLETED | OUTPATIENT
Start: 2024-02-25 | End: 2024-02-25

## 2024-02-25 RX ORDER — ONDANSETRON 2 MG/ML
4 INJECTION INTRAMUSCULAR; INTRAVENOUS EVERY 30 MIN PRN
Status: DISCONTINUED | OUTPATIENT
Start: 2024-02-25 | End: 2024-02-25 | Stop reason: HOSPADM

## 2024-02-25 RX ADMIN — SODIUM CHLORIDE 1000 ML: 9 INJECTION, SOLUTION INTRAVENOUS at 17:47

## 2024-02-25 RX ADMIN — ONDANSETRON 4 MG: 2 INJECTION INTRAMUSCULAR; INTRAVENOUS at 17:47

## 2024-02-25 RX ADMIN — KETOROLAC TROMETHAMINE 30 MG: 30 INJECTION, SOLUTION INTRAMUSCULAR at 17:48

## 2024-02-25 ASSESSMENT — PAIN SCALES - GENERAL: PAINLEVEL_OUTOF10: 3

## 2024-02-25 ASSESSMENT — PAIN - FUNCTIONAL ASSESSMENT
PAIN_FUNCTIONAL_ASSESSMENT: NONE - DENIES PAIN
PAIN_FUNCTIONAL_ASSESSMENT: 0-10

## 2024-02-26 NOTE — ED PROVIDER NOTES
ADDENDUM:    Care of the patient was assumed  from Dr. Jones.   I have reviewed the notes, assessments, and/or procedures performed, I concur with her/his documentation on Damaris Moon.      I reviewed the medical record and evaluated the patient with the previous physician.    See above physician note for HPI,  physical exam and other details. This was a checked out patient to me due to end of shift by above physician.     ED COURSE/MDM:  Laboratory and imaging data were reviewed and care plan was arranged and discussed with the patient(see separate lab/imaging reports).    RADIOLOGY:  Already resulted studies have been reviewed.  CT ABDOMEN PELVIS WO CONTRAST   Final Result   1. No findings nephrolithiasis or obstructive uropathy.   2. Punctate calculus dependent in the urinary bladder which may reflect a   recently passed calculus.             Labs Reviewed   URINALYSIS WITH REFLEX TO CULTURE - Abnormal; Notable for the following components:       Result Value    Ketones, Urine 40 (*)     Blood, Urine LARGE NUMBER OR AMOUNT OBSERVED (*)     Protein, UA TRACE (*)     All other components within normal limits   CBC WITH AUTO DIFFERENTIAL - Abnormal; Notable for the following components:    WBC 12.5 (*)     RBC 4.06 (*)     Hemoglobin 12.2 (*)     Hematocrit 36.7 (*)     Segs Relative 83.3 (*)     Lymphocytes % 11.8 (*)     Monocytes % 4.4 (*)     All other components within normal limits   COMPREHENSIVE METABOLIC PANEL - Abnormal; Notable for the following components:    Glucose 100 (*)     Creatinine 0.5 (*)     All other components within normal limits   URINE MICROSCOPIC WITH REFLEX TO CULTURE - Abnormal; Notable for the following components:    RBC, UA 30 (*)     All other components within normal limits       Medications   0.9 % sodium chloride infusion (has no administration in time range)   ondansetron (ZOFRAN) injection 4 mg (4 mg IntraVENous Given 2/25/24 8519)   sodium chloride 0.9 % bolus 1,000 mL (1,000 
Date     SECTION  2010, 2016, 11/2016    x3    CHOLECYSTECTOMY, LAPAROSCOPIC  2016    OVARIAN CYST REMOVAL  2014    SLEEVE GASTRECTOMY N/A 2021    GASTRECTOMY SLEEVE LAPAROSCOPIC ROBOTIC performed by Jurgen Elliott MD at [facility] OR    TONSILLECTOMY  age 8    T & A     TUBAL LIGATION  2016    UPPER GASTROINTESTINAL ENDOSCOPY N/A 2021    EGD BIOPSY performed by Jurgen Elliott MD at [facility] ENDOSCOPY     Family History   Problem Relation Age of Onset    High Blood Pressure Mother     No Known Problems Father     No Known Problems Maternal Grandmother     No Known Problems Maternal Grandfather     Cancer Paternal Grandmother     No Known Problems Paternal Grandfather      Social History     Socioeconomic History    Marital status:      Spouse name: Not on file    Number of children: Not on file    Years of education: Not on file    Highest education level: Not on file   Occupational History    Not on file   Tobacco Use    Smoking status: Former     Current packs/day: 0.00     Types: Cigarettes     Start date: 2011     Quit date: 2020     Years since quitting: 3.2    Smokeless tobacco: Never   Vaping Use    Vaping Use: Never used   Substance and Sexual Activity    Alcohol use: Never    Drug use: No    Sexual activity: Yes     Partners: Male   Other Topics Concern    Not on file   Social History Narrative    Not on file     Social Determinants of Health     Financial Resource Strain: Not on file   Food Insecurity: Not on file   Transportation Needs: Not on file   Physical Activity: Not on file   Stress: Not on file   Social Connections: Not on file   Intimate Partner Violence: Not on file   Housing Stability: Not on file     Current Facility-Administered Medications   Medication Dose Route Frequency Provider Last Rate Last Admin    sodium chloride 0.9 % bolus 1,000 mL  1,000 mL IntraVENous Once Fred Jones DO 1,000 mL/hr at 24 1747 1,000 mL at 24 1747

## 2024-04-02 PROBLEM — D64.9 NORMOCYTIC ANEMIA: Status: ACTIVE | Noted: 2024-04-02

## 2024-04-02 PROBLEM — N20.0 NEPHROLITHIASIS: Status: ACTIVE | Noted: 2024-04-02

## 2024-04-02 PROBLEM — R73.01 IMPAIRED FASTING GLUCOSE: Status: ACTIVE | Noted: 2024-04-02

## 2024-04-02 PROBLEM — F41.9 ANXIETY: Status: ACTIVE | Noted: 2024-04-02

## 2024-04-02 PROBLEM — Z86.39 H/O HYPERLIPIDEMIA: Status: ACTIVE | Noted: 2024-04-02

## 2024-07-23 ENCOUNTER — OFFICE VISIT (OUTPATIENT)
Dept: BARIATRICS/WEIGHT MGMT | Age: 34
End: 2024-07-23

## 2024-07-23 ENCOUNTER — HOSPITAL ENCOUNTER (OUTPATIENT)
Age: 34
Setting detail: SPECIMEN
Discharge: HOME | End: 2024-07-23
Payer: COMMERCIAL

## 2024-07-23 VITALS
SYSTOLIC BLOOD PRESSURE: 110 MMHG | BODY MASS INDEX: 24.53 KG/M2 | HEART RATE: 73 BPM | HEIGHT: 64 IN | WEIGHT: 143.7 LBS | OXYGEN SATURATION: 90 % | DIASTOLIC BLOOD PRESSURE: 70 MMHG

## 2024-07-23 DIAGNOSIS — L72.11 PILAR CYST: Primary | ICD-10-CM

## 2024-07-23 DIAGNOSIS — L72.11 PILAR CYST OF SCALP: Primary | ICD-10-CM

## 2024-07-23 PROCEDURE — 88304 TISSUE EXAM BY PATHOLOGIST: CPT | Performed by: PATHOLOGY

## 2024-07-23 RX ORDER — IBUPROFEN 200 MG
1 CAPSULE ORAL DAILY
COMMUNITY

## 2024-07-23 NOTE — PROGRESS NOTES
Office Procedure note:        Pre-op Diagnosis:       Pilar cyst.      Post-op Diagnosis:   Same.      Procedure:                  Excision of pilar cyst x 2 .     Surgeon:                    Jeremy Caal II, MD        Anesthesia:                Local with 1% Lidocaine local infiltration,with epinephrine.     Complications:          None. Drains: None     Indications:                recurrent pilar cysts.     Procedure:      The patient is placed with the above described area exposed. This was  prepped, draped and anesthestized in the usual sterile manner. Two separate incisions were made. The two lesions were excised completely.The resulting wounds were closed with 3-0 nylon.  A sterile dressing is applied. The patient is instructed on wound care. EBL less than 3 mL. Pt tolerated well.     Jeremy Caal II, MD

## 2024-08-01 ENCOUNTER — OFFICE VISIT (OUTPATIENT)
Dept: BARIATRICS/WEIGHT MGMT | Age: 34
End: 2024-08-01

## 2024-08-01 VITALS
DIASTOLIC BLOOD PRESSURE: 80 MMHG | BODY MASS INDEX: 24.52 KG/M2 | SYSTOLIC BLOOD PRESSURE: 120 MMHG | OXYGEN SATURATION: 99 % | HEART RATE: 76 BPM | WEIGHT: 143.6 LBS | HEIGHT: 64 IN

## 2024-08-01 DIAGNOSIS — L72.11 PILAR CYST OF SCALP: Primary | ICD-10-CM

## 2024-08-01 NOTE — PROGRESS NOTES
Chief Complaint   Patient presents with    Follow-up     Suture removal  cyst of head         SUBJECTIVE:  HPI: Patient is here with complaints of:    Suture removal.     No issues.     Doing well.    I have reviewed the patient's(pertinent information to this visit) medical history, family history(scanned in  the [employer] under \"patient questioner\"), social history and review of systems with the patient today in the office.            Past Surgical History:   Procedure Laterality Date     SECTION  2010, 2016, 11/2016    x3    CHOLECYSTECTOMY, LAPAROSCOPIC  2016    OVARIAN CYST REMOVAL  2014    SLEEVE GASTRECTOMY N/A 2021    GASTRECTOMY SLEEVE LAPAROSCOPIC ROBOTIC performed by Jurgen Elliott MD at [facility] OR    TONSILLECTOMY  age 8    T & A     TUBAL LIGATION  2016    UPPER GASTROINTESTINAL ENDOSCOPY N/A 2021    EGD BIOPSY performed by Jurgen Elliott MD at [facility] ENDOSCOPY     Past Medical History:   Diagnosis Date    Anxiety     Depression     Gastric ulcer     Taking Protonix    OCD (obsessive compulsive disorder)     Polycystic ovarian syndrome      Family History   Problem Relation Age of Onset    High Blood Pressure Mother     No Known Problems Father     No Known Problems Maternal Grandmother     No Known Problems Maternal Grandfather     Cancer Paternal Grandmother     No Known Problems Paternal Grandfather      Social History     Socioeconomic History    Marital status:      Spouse name: Not on file    Number of children: Not on file    Years of education: Not on file    Highest education level: Not on file   Occupational History    Not on file   Tobacco Use    Smoking status: Former     Current packs/day: 0.00     Types: Cigarettes     Start date: 2011     Quit date: 2020     Years since quitting: 3.6    Smokeless tobacco: Never   Vaping Use    Vaping Use: Never used   Substance and Sexual Activity    Alcohol use: Never    Drug use: No    Sexual

## 2024-08-26 ENCOUNTER — TELEPHONE (OUTPATIENT)
Dept: BARIATRICS/WEIGHT MGMT | Age: 34
End: 2024-08-26

## 2024-10-17 ENCOUNTER — HOSPITAL ENCOUNTER (OUTPATIENT)
Dept: GENERAL RADIOLOGY | Age: 34
Discharge: HOME | End: 2024-10-17
Payer: COMMERCIAL

## 2024-10-17 ENCOUNTER — HOSPITAL ENCOUNTER (OUTPATIENT)
Dept: CT IMAGING | Age: 34
Discharge: HOME | End: 2024-10-17
Payer: COMMERCIAL

## 2024-10-17 ENCOUNTER — OFFICE VISIT (OUTPATIENT)
Age: 34
End: 2024-10-17
Payer: COMMERCIAL

## 2024-10-17 ENCOUNTER — HOSPITAL ENCOUNTER (OUTPATIENT)
Age: 34
Discharge: HOME | End: 2024-10-17
Payer: COMMERCIAL

## 2024-10-17 VITALS
HEIGHT: 64 IN | OXYGEN SATURATION: 98 % | SYSTOLIC BLOOD PRESSURE: 126 MMHG | BODY MASS INDEX: 24.41 KG/M2 | WEIGHT: 143 LBS | DIASTOLIC BLOOD PRESSURE: 70 MMHG | HEART RATE: 86 BPM

## 2024-10-17 DIAGNOSIS — M54.6 BACK PAIN OF THORACOLUMBAR REGION: ICD-10-CM
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
## 2024-10-17 DIAGNOSIS — S09.90XD INJURY OF HEAD, SUBSEQUENT ENCOUNTER: ICD-10-CM

## 2024-10-17 DIAGNOSIS — M54.50 BACK PAIN OF THORACOLUMBAR REGION: ICD-10-CM

## 2024-10-17 DIAGNOSIS — R11.0 NAUSEA: ICD-10-CM

## 2024-10-17 DIAGNOSIS — S09.90XD INJURY OF HEAD, SUBSEQUENT ENCOUNTER: Primary | ICD-10-CM

## 2024-10-17 PROCEDURE — G8427 DOCREV CUR MEDS BY ELIG CLIN: HCPCS | Performed by: PHYSICIAN ASSISTANT

## 2024-10-17 PROCEDURE — G8484 FLU IMMUNIZE NO ADMIN: HCPCS | Performed by: PHYSICIAN ASSISTANT

## 2024-10-17 PROCEDURE — 99214 OFFICE O/P EST MOD 30 MIN: CPT | Performed by: PHYSICIAN ASSISTANT

## 2024-10-17 PROCEDURE — 72100 X-RAY EXAM L-S SPINE 2/3 VWS: CPT

## 2024-10-17 PROCEDURE — G8420 CALC BMI NORM PARAMETERS: HCPCS | Performed by: PHYSICIAN ASSISTANT

## 2024-10-17 PROCEDURE — 72072 X-RAY EXAM THORAC SPINE 3VWS: CPT

## 2024-10-17 PROCEDURE — 1036F TOBACCO NON-USER: CPT | Performed by: PHYSICIAN ASSISTANT

## 2024-10-17 PROCEDURE — 70450 CT HEAD/BRAIN W/O DYE: CPT

## 2024-10-17 RX ORDER — METHOCARBAMOL 750 MG/1
750 TABLET, FILM COATED ORAL 3 TIMES DAILY PRN
Qty: 30 TABLET | Refills: 0 | Status: SHIPPED | OUTPATIENT
Start: 2024-10-17 | End: 2024-10-27

## 2024-10-17 RX ORDER — HYDROCODONE BITARTRATE AND ACETAMINOPHEN 5; 325 MG/1; MG/1
1 TABLET ORAL EVERY 4 HOURS PRN
COMMUNITY
Start: 2024-10-14 | End: 2024-10-17

## 2024-10-17 RX ORDER — IBUPROFEN 800 MG/1
800 TABLET, FILM COATED ORAL 2 TIMES DAILY PRN
Qty: 60 TABLET | Refills: 0 | Status: SHIPPED | OUTPATIENT
Start: 2024-10-17

## 2024-10-17 RX ORDER — ONDANSETRON 4 MG/1
4 TABLET, ORALLY DISINTEGRATING ORAL 3 TIMES DAILY PRN
Qty: 30 TABLET | Refills: 0 | Status: SHIPPED | OUTPATIENT
Start: 2024-10-17

## 2024-10-17 RX ORDER — METHOCARBAMOL 750 MG/1
750 TABLET, FILM COATED ORAL 3 TIMES DAILY PRN
COMMUNITY
Start: 2024-10-14 | End: 2024-10-17 | Stop reason: SDUPTHER

## 2024-10-17 ASSESSMENT — ENCOUNTER SYMPTOMS
COLOR CHANGE: 0
EYE DISCHARGE: 0
FACIAL SWELLING: 1
VOMITING: 0
PHOTOPHOBIA: 0
RHINORRHEA: 0
SORE THROAT: 0
BLOOD IN STOOL: 0
ABDOMINAL PAIN: 0
EYE REDNESS: 0
COUGH: 0
WHEEZING: 0
NAUSEA: 0
CHEST TIGHTNESS: 0
SHORTNESS OF BREATH: 0
EYE PAIN: 0
CONSTIPATION: 0
BACK PAIN: 1
DIARRHEA: 0
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
## 2024-10-17 NOTE — RESULT ENCOUNTER NOTE
Reviewed results with patient via telephone, can continue with Robaxin as needed, ibuprofen as needed which patient has been previously and tolerated well.  Zofran as needed.  Reviewed proper use, monitoring, side effects and return precautions.
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
## 2024-11-08 ENCOUNTER — TELEPHONE (OUTPATIENT)
Age: 34
End: 2024-11-08

## 2024-11-08 NOTE — TELEPHONE ENCOUNTER
Called patient and updated with Appointment on 11/12/2024 at 2:30 pm   Patient voices understanding.

## 2024-11-08 NOTE — TELEPHONE ENCOUNTER
Called patient and unable to reach.   Left voice message for patient to return call.   Was calling patient to give her appointment dated on 11/12/2024 at 2:30 pm   with Dr. Zambrano .

## 2024-11-12 ENCOUNTER — OFFICE VISIT (OUTPATIENT)
Age: 34
End: 2024-11-12

## 2024-11-12 VITALS
BODY MASS INDEX: 26.09 KG/M2 | RESPIRATION RATE: 20 BRPM | HEART RATE: 69 BPM | SYSTOLIC BLOOD PRESSURE: 122 MMHG | DIASTOLIC BLOOD PRESSURE: 80 MMHG | WEIGHT: 152 LBS | OXYGEN SATURATION: 100 %

## 2024-11-12 DIAGNOSIS — F51.4 NIGHT TERROR: ICD-10-CM

## 2024-11-12 DIAGNOSIS — F07.81 POST CONCUSSION SYNDROME: Primary | ICD-10-CM

## 2024-11-12 DIAGNOSIS — F32.9 REACTIVE DEPRESSION: ICD-10-CM

## 2024-11-12 DIAGNOSIS — F41.9 ANXIETY: ICD-10-CM

## 2024-11-12 PROBLEM — Z98.84 S/P LAPAROSCOPIC SLEEVE GASTRECTOMY: Status: ACTIVE | Noted: 2021-08-13

## 2024-11-12 PROBLEM — E66.01 MORBID OBESITY DUE TO EXCESS CALORIES: Status: RESOLVED | Noted: 2021-01-27 | Resolved: 2024-11-12

## 2024-11-12 PROBLEM — E66.01 MORBID OBESITY DUE TO EXCESS CALORIES: Status: ACTIVE | Noted: 2021-01-27

## 2024-11-12 RX ORDER — PRAZOSIN HYDROCHLORIDE 2 MG/1
2 CAPSULE ORAL NIGHTLY
Qty: 5 CAPSULE | Refills: 0 | Status: SHIPPED | OUTPATIENT
Start: 2024-11-12 | End: 2024-11-20 | Stop reason: SDUPTHER

## 2024-11-12 RX ORDER — HYDROCODONE BITARTRATE AND ACETAMINOPHEN 7.5; 325 MG/1; MG/1
1 TABLET ORAL EVERY 6 HOURS PRN
COMMUNITY
Start: 2024-08-30

## 2024-11-12 RX ORDER — ESCITALOPRAM OXALATE 10 MG/1
10 TABLET ORAL DAILY
Qty: 90 TABLET | Refills: 0 | Status: SHIPPED | OUTPATIENT
Start: 2024-11-12 | End: 2025-02-10

## 2024-11-12 SDOH — ECONOMIC STABILITY: FOOD INSECURITY: WITHIN THE PAST 12 MONTHS, THE FOOD YOU BOUGHT JUST DIDN'T LAST AND YOU DIDN'T HAVE MONEY TO GET MORE.: NEVER TRUE

## 2024-11-12 SDOH — ECONOMIC STABILITY: FOOD INSECURITY: WITHIN THE PAST 12 MONTHS, YOU WORRIED THAT YOUR FOOD WOULD RUN OUT BEFORE YOU GOT MONEY TO BUY MORE.: NEVER TRUE

## 2024-11-12 SDOH — ECONOMIC STABILITY: INCOME INSECURITY: HOW HARD IS IT FOR YOU TO PAY FOR THE VERY BASICS LIKE FOOD, HOUSING, MEDICAL CARE, AND HEATING?: NOT HARD AT ALL

## 2024-11-12 ASSESSMENT — PATIENT HEALTH QUESTIONNAIRE - PHQ9
SUM OF ALL RESPONSES TO PHQ QUESTIONS 1-9: 0
2. FEELING DOWN, DEPRESSED OR HOPELESS: NOT AT ALL
1. LITTLE INTEREST OR PLEASURE IN DOING THINGS: NOT AT ALL
SUM OF ALL RESPONSES TO PHQ9 QUESTIONS 1 & 2: 0
SUM OF ALL RESPONSES TO PHQ QUESTIONS 1-9: 0
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
## 2024-11-12 NOTE — PATIENT INSTRUCTIONS
We are committed to providing you the best care possible.    If you receive a survey after visiting one of our offices, please take time to share your experience concerning your physician office visit.  These surveys are confidential and no health information about you is shared.    We are eager to improve for you and continue to give you satisfactory care, we are counting on your feedback to help make that happen.            Welcome to [facility]:    Did you know we now have a faster way for you to move through your appointment? For your convenience, we now have digital registration available. When you schedule your next appointment, you will receive a link via your email as well as a text message that will allow you to complete any paperwork digitally before your appointment.

## 2024-11-18 ENCOUNTER — PATIENT MESSAGE (OUTPATIENT)
Age: 34
End: 2024-11-18

## 2024-11-18 DIAGNOSIS — F51.4 NIGHT TERROR: ICD-10-CM

## 2024-11-20 RX ORDER — PRAZOSIN HYDROCHLORIDE 2 MG/1
2 CAPSULE ORAL NIGHTLY
Qty: 30 CAPSULE | Refills: 0 | Status: SHIPPED | OUTPATIENT
Start: 2024-11-20 | End: 2024-12-20

## 2024-11-20 NOTE — TELEPHONE ENCOUNTER
Patient reports significant improvement in sleep.  Does not have any disturbances at sleep and is getting much beneficial rest.  Will continue prazosin 5 mg nightly at this time    Lulú Zambrano MD  Family Medicine  11/20/24  4:25 PM

## 2024-12-17 DIAGNOSIS — F51.4 NIGHT TERROR: ICD-10-CM

## 2024-12-18 DIAGNOSIS — F51.4 NIGHT TERROR: ICD-10-CM

## 2024-12-23 ENCOUNTER — TELEPHONE (OUTPATIENT)
Age: 34
End: 2024-12-23

## 2024-12-23 DIAGNOSIS — F51.4 NIGHT TERROR: ICD-10-CM

## 2024-12-23 RX ORDER — PRAZOSIN HYDROCHLORIDE 2 MG/1
2 CAPSULE ORAL NIGHTLY
Qty: 30 CAPSULE | Refills: 0 | Status: SHIPPED | OUTPATIENT
Start: 2024-12-23 | End: 2025-01-22

## 2024-12-30 RX ORDER — PRAZOSIN HYDROCHLORIDE 2 MG/1
2 CAPSULE ORAL NIGHTLY
Qty: 30 CAPSULE | Refills: 0 | OUTPATIENT
Start: 2024-12-30 | End: 2025-01-29

## 2024-12-30 RX ORDER — PRAZOSIN HYDROCHLORIDE 2 MG/1
2 CAPSULE ORAL NIGHTLY
Qty: 30 CAPSULE | Refills: 0 | OUTPATIENT
Start: 2024-12-30

## 2025-01-21 DIAGNOSIS — F51.4 NIGHT TERROR: ICD-10-CM

## 2025-01-23 RX ORDER — PRAZOSIN HYDROCHLORIDE 2 MG/1
2 CAPSULE ORAL NIGHTLY
Qty: 30 CAPSULE | Refills: 0 | Status: SHIPPED | OUTPATIENT
Start: 2025-01-23

## 2025-01-27 ENCOUNTER — OFFICE VISIT (OUTPATIENT)
Age: 35
End: 2025-01-27
Payer: COMMERCIAL

## 2025-01-27 VITALS
WEIGHT: 166.4 LBS | BODY MASS INDEX: 28.56 KG/M2 | DIASTOLIC BLOOD PRESSURE: 80 MMHG | SYSTOLIC BLOOD PRESSURE: 122 MMHG | RESPIRATION RATE: 20 BRPM | OXYGEN SATURATION: 97 % | HEART RATE: 66 BPM

## 2025-01-27 DIAGNOSIS — F41.9 ANXIETY: ICD-10-CM

## 2025-01-27 DIAGNOSIS — F07.81 POST CONCUSSION SYNDROME: ICD-10-CM

## 2025-01-27 DIAGNOSIS — F32.9 REACTIVE DEPRESSION: ICD-10-CM

## 2025-01-27 DIAGNOSIS — F51.4 NIGHT TERROR: Primary | ICD-10-CM

## 2025-01-27 PROCEDURE — G2211 COMPLEX E/M VISIT ADD ON: HCPCS

## 2025-01-27 PROCEDURE — G8419 CALC BMI OUT NRM PARAM NOF/U: HCPCS

## 2025-01-27 PROCEDURE — 1036F TOBACCO NON-USER: CPT

## 2025-01-27 PROCEDURE — G8427 DOCREV CUR MEDS BY ELIG CLIN: HCPCS

## 2025-01-27 PROCEDURE — 99214 OFFICE O/P EST MOD 30 MIN: CPT

## 2025-01-27 RX ORDER — DULOXETIN HYDROCHLORIDE 20 MG/1
40 CAPSULE, DELAYED RELEASE ORAL DAILY
Qty: 60 CAPSULE | Refills: 0 | Status: SHIPPED | OUTPATIENT
Start: 2025-01-27 | End: 2025-02-26

## 2025-01-27 SDOH — ECONOMIC STABILITY: FOOD INSECURITY: WITHIN THE PAST 12 MONTHS, THE FOOD YOU BOUGHT JUST DIDN'T LAST AND YOU DIDN'T HAVE MONEY TO GET MORE.: NEVER TRUE

## 2025-01-27 SDOH — ECONOMIC STABILITY: FOOD INSECURITY: WITHIN THE PAST 12 MONTHS, YOU WORRIED THAT YOUR FOOD WOULD RUN OUT BEFORE YOU GOT MONEY TO BUY MORE.: NEVER TRUE

## 2025-01-27 ASSESSMENT — PATIENT HEALTH QUESTIONNAIRE - PHQ9
4. FEELING TIRED OR HAVING LITTLE ENERGY: NOT AT ALL
2. FEELING DOWN, DEPRESSED OR HOPELESS: NOT AT ALL
SUM OF ALL RESPONSES TO PHQ QUESTIONS 1-9: 6
8. MOVING OR SPEAKING SO SLOWLY THAT OTHER PEOPLE COULD HAVE NOTICED. OR THE OPPOSITE, BEING SO FIGETY OR RESTLESS THAT YOU HAVE BEEN MOVING AROUND A LOT MORE THAN USUAL: NEARLY EVERY DAY
9. THOUGHTS THAT YOU WOULD BE BETTER OFF DEAD, OR OF HURTING YOURSELF: NOT AT ALL
7. TROUBLE CONCENTRATING ON THINGS, SUCH AS READING THE NEWSPAPER OR WATCHING TELEVISION: NOT AT ALL
6. FEELING BAD ABOUT YOURSELF - OR THAT YOU ARE A FAILURE OR HAVE LET YOURSELF OR YOUR FAMILY DOWN: NOT AT ALL
SUM OF ALL RESPONSES TO PHQ QUESTIONS 1-9: 6
3. TROUBLE FALLING OR STAYING ASLEEP: NOT AT ALL
SUM OF ALL RESPONSES TO PHQ QUESTIONS 1-9: 6
1. LITTLE INTEREST OR PLEASURE IN DOING THINGS: NOT AT ALL
5. POOR APPETITE OR OVEREATING: NEARLY EVERY DAY
SUM OF ALL RESPONSES TO PHQ9 QUESTIONS 1 & 2: 0
10. IF YOU CHECKED OFF ANY PROBLEMS, HOW DIFFICULT HAVE THESE PROBLEMS MADE IT FOR YOU TO DO YOUR WORK, TAKE CARE OF THINGS AT HOME, OR GET ALONG WITH OTHER PEOPLE: NOT DIFFICULT AT ALL
SUM OF ALL RESPONSES TO PHQ QUESTIONS 1-9: 6

## 2025-01-27 NOTE — PROGRESS NOTES
[facility]  [address]  [facility] [address]  Dept: [phone]  Dept Fax: [phone]  Loc: [phone]      Visit type: Established patient    Encounter Start Time: 1:57 PM EST  Encounter End Time: 2:09 PM EST     100% of the time was spent with the patient today, discussing their symptoms, conducting an examination, reviewing the patient's diagnostic test results, and counseling    Reason for Visit: Follow-up (8wk follow up)      Assessment and Plan         Assessment & Plan  Depression 2/2 concussion  Uncontrolled  Her depression is reactive, likely stemming from a concussion.  Worried about weight gain and feeling like \"zombie\" due to the Lexapro.  Treatment plan:   - Transition to duloxetine, starting with a 20 mg dose for 3 to 5 days  - If no side effects are observed, increase dosage to 40 mg  - Regular exercise, such as 20 minutes of yoga in the morning, is recommended to aid in brain growth and recovery from her concussion  Future Plan:   - If side effects occur titrate off of medication by lowering the dose every 5 days  - Monitor focus and alertness levels    Anxiety 2/2 concussion  Uncontrolled  Her anxiety is also reactive, related to her concussion.  Treatment plan:   - Transition to duloxetine as described above  - Regular exercise is recommended to help manage anxiety levels  Future Plan:   - Monitor symptoms and report any changes    Night terrors  Controlled  Treatment plan:  - Continue prazosin 2 mg nightly      Follow-up  The patient will follow up in 1 month, which can be conducted virtually.      Orders Placed This Encounter   Medications    DULoxetine (CYMBALTA) 20 MG extended release capsule     Sig: Take 2 capsules by mouth daily     Dispense:  60 capsule     Refill:  0         Patient was involved and agreeable in shared decision making.  Information about different treatment options including side effects discussed with patient.  Any

## 2025-02-24 ENCOUNTER — TELEMEDICINE (OUTPATIENT)
Age: 35
End: 2025-02-24
Payer: COMMERCIAL

## 2025-02-24 DIAGNOSIS — F41.9 ANXIETY: ICD-10-CM

## 2025-02-24 DIAGNOSIS — F32.9 REACTIVE DEPRESSION: ICD-10-CM

## 2025-02-24 DIAGNOSIS — F07.81 POST CONCUSSION SYNDROME: ICD-10-CM

## 2025-02-24 PROCEDURE — 1036F TOBACCO NON-USER: CPT

## 2025-02-24 PROCEDURE — G8428 CUR MEDS NOT DOCUMENT: HCPCS

## 2025-02-24 PROCEDURE — G2211 COMPLEX E/M VISIT ADD ON: HCPCS

## 2025-02-24 PROCEDURE — 99213 OFFICE O/P EST LOW 20 MIN: CPT

## 2025-02-24 PROCEDURE — G8419 CALC BMI OUT NRM PARAM NOF/U: HCPCS

## 2025-02-24 RX ORDER — DULOXETINE 40 MG/1
40 CAPSULE, DELAYED RELEASE ORAL DAILY
Qty: 30 CAPSULE | Refills: 0 | Status: SHIPPED | OUTPATIENT
Start: 2025-02-24 | End: 2025-03-26

## 2025-02-24 NOTE — PROGRESS NOTES
[facility]  [address]  [facility] [address]  Dept: [phone]  Dept Fax: [phone]  Loc: [phone]      Visit type: Established patient    Encounter Start Time: 4:17 PM EST  Encounter End Time: 4:21 PM EST     100% of the time was spent with the patient today, discussing their symptoms, conducting an examination, reviewing the patient's diagnostic test results, and counseling    Reason for Visit: Depression and Anxiety      Assessment and Plan       Anxiety/Depression  Improving  -No Side effects noted  -No SI/HI  -Will Continue Cymbalta 40 mg daily  -Can consider increasing at nect visit if symptoms are not completely covered.  Assessment & Plan      No orders of the defined types were placed in this encounter.    Orders Placed This Encounter   Medications    DULoxetine 40 MG CPEP     Sig: Take 40 mg by mouth daily     Dispense:  30 capsule     Refill:  0         Patient was involved and agreeable in shared decision making.  Information about different treatment options including side effects discussed with patient.  Any information requested was supplied.    Return in about 4 weeks (around 3/24/2025) for Mood.      Subjective     Damaris Moon is a 34 y.o. female is here for:   Chief Complaint   Patient presents with    Depression    Anxiety        History of Present Illness    Damaris Moon is a 34 y.o. female who is here to follow-up after increasing Cymbalta from 20 mg to 40 mg.  Patient has noticed an improvement in her symptoms but symptoms are still present.  Denies any suicidal homicidal ideation.  Does not note any side effects at this time.  Overall is feeling more motivated and more hopeful.          Past medical history reviewed  Medication review  Allergies reviewed  Social history reviewed    Objective       There were no vitals taken for this visit.      Physical Exam  Psychiatric:         Attention and Perception: Attention and perception normal.

## 2025-03-03 DIAGNOSIS — F51.4 NIGHT TERROR: ICD-10-CM

## 2025-03-03 RX ORDER — PRAZOSIN HYDROCHLORIDE 2 MG/1
2 CAPSULE ORAL NIGHTLY
Qty: 90 CAPSULE | Refills: 0 | Status: SHIPPED | OUTPATIENT
Start: 2025-03-03 | End: 2025-06-01

## 2025-03-25 ENCOUNTER — PATIENT MESSAGE (OUTPATIENT)
Age: 35
End: 2025-03-25

## 2025-03-25 DIAGNOSIS — F07.81 POST CONCUSSION SYNDROME: ICD-10-CM

## 2025-03-25 DIAGNOSIS — F41.9 ANXIETY: ICD-10-CM

## 2025-03-25 DIAGNOSIS — F32.9 REACTIVE DEPRESSION: ICD-10-CM

## 2025-03-27 RX ORDER — DULOXETIN HYDROCHLORIDE 30 MG/1
30 CAPSULE, DELAYED RELEASE ORAL DAILY
Qty: 90 CAPSULE | Refills: 1 | Status: SHIPPED | OUTPATIENT
Start: 2025-03-27

## 2025-03-27 NOTE — TELEPHONE ENCOUNTER
I am sending Damaris 30 mg pills to be taken once a day hoping that this will remove her \"wired feeling\". If she has any questions or concerns please let me know    Lulú Zambrano MD  Family Medicine  03/27/25  4:59 PM

## 2025-04-15 ENCOUNTER — OFFICE VISIT (OUTPATIENT)
Age: 35
End: 2025-04-15
Payer: COMMERCIAL

## 2025-04-15 VITALS
DIASTOLIC BLOOD PRESSURE: 78 MMHG | OXYGEN SATURATION: 98 % | SYSTOLIC BLOOD PRESSURE: 122 MMHG | RESPIRATION RATE: 20 BRPM | WEIGHT: 152.2 LBS | HEART RATE: 86 BPM | BODY MASS INDEX: 26.13 KG/M2

## 2025-04-15 DIAGNOSIS — M54.6 BACK PAIN OF THORACOLUMBAR REGION: ICD-10-CM

## 2025-04-15 DIAGNOSIS — F41.0 PANIC DISORDER: Primary | ICD-10-CM

## 2025-04-15 DIAGNOSIS — R11.0 NAUSEA: ICD-10-CM

## 2025-04-15 DIAGNOSIS — G44.329 CHRONIC POST-CONCUSSION HEADACHE: ICD-10-CM

## 2025-04-15 DIAGNOSIS — F43.0 ACUTE STRESS REACTION: ICD-10-CM

## 2025-04-15 DIAGNOSIS — M54.50 BACK PAIN OF THORACOLUMBAR REGION: ICD-10-CM

## 2025-04-15 PROCEDURE — G2211 COMPLEX E/M VISIT ADD ON: HCPCS

## 2025-04-15 PROCEDURE — 99214 OFFICE O/P EST MOD 30 MIN: CPT

## 2025-04-15 RX ORDER — NAPROXEN 250 MG/1
500 TABLET ORAL 2 TIMES DAILY WITH MEALS
Qty: 120 TABLET | Refills: 0 | Status: SHIPPED | OUTPATIENT
Start: 2025-04-15 | End: 2025-05-15

## 2025-04-15 RX ORDER — IBUPROFEN 800 MG/1
800 TABLET, FILM COATED ORAL 2 TIMES DAILY PRN
Qty: 60 TABLET | Refills: 0 | Status: CANCELLED | OUTPATIENT
Start: 2025-04-15

## 2025-04-15 RX ORDER — ONDANSETRON 4 MG/1
4 TABLET, ORALLY DISINTEGRATING ORAL 3 TIMES DAILY PRN
Qty: 21 TABLET | Refills: 0 | Status: SHIPPED | OUTPATIENT
Start: 2025-04-15

## 2025-04-15 RX ORDER — DIAZEPAM 10 MG/1
10 TABLET ORAL EVERY 12 HOURS PRN
Qty: 28 TABLET | Refills: 0 | Status: SHIPPED | OUTPATIENT
Start: 2025-04-15 | End: 2025-04-29
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
## 2025-04-17 ENCOUNTER — TELEPHONE (OUTPATIENT)
Age: 35
End: 2025-04-17

## 2025-04-17 NOTE — TELEPHONE ENCOUNTER
Left message on voicemail to return call and schedule an appointment with Kristan Gonzales at  psych.

## 2025-04-28 ENCOUNTER — OFFICE VISIT (OUTPATIENT)
Dept: PSYCHOLOGY | Age: 35
End: 2025-04-28

## 2025-04-28 DIAGNOSIS — F41.9 ANXIETY: Primary | ICD-10-CM

## 2025-04-28 PROCEDURE — 90791 PSYCH DIAGNOSTIC EVALUATION: CPT | Performed by: PSYCHOLOGIST
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
## 2025-05-01 ENCOUNTER — OFFICE VISIT (OUTPATIENT)
Age: 35
End: 2025-05-01

## 2025-05-01 VITALS
OXYGEN SATURATION: 98 % | BODY MASS INDEX: 25.92 KG/M2 | RESPIRATION RATE: 16 BRPM | SYSTOLIC BLOOD PRESSURE: 130 MMHG | DIASTOLIC BLOOD PRESSURE: 86 MMHG | WEIGHT: 151 LBS | HEART RATE: 68 BPM

## 2025-05-01 DIAGNOSIS — Z13.1 DIABETES MELLITUS SCREENING: ICD-10-CM

## 2025-05-01 DIAGNOSIS — F41.0 PANIC DISORDER: ICD-10-CM

## 2025-05-01 DIAGNOSIS — F32.9 REACTIVE DEPRESSION: Primary | ICD-10-CM

## 2025-05-01 DIAGNOSIS — R11.0 NAUSEA: ICD-10-CM

## 2025-05-01 RX ORDER — ONDANSETRON 4 MG/1
4 TABLET, ORALLY DISINTEGRATING ORAL 3 TIMES DAILY PRN
Qty: 21 TABLET | Refills: 0 | Status: SHIPPED | OUTPATIENT
Start: 2025-05-01

## 2025-05-01 RX ORDER — DIAZEPAM 5 MG/1
5 TABLET ORAL EVERY 12 HOURS PRN
Qty: 10 TABLET | Refills: 0 | Status: SHIPPED | OUTPATIENT
Start: 2025-05-01 | End: 2025-05-14
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
## 2025-05-02 LAB
ALBUMIN SERPL-MCNC: 4.4 G/DL (ref 3.4–5)
ALBUMIN/GLOB SERPL: 1.9 {RATIO} (ref 1.1–2.2)
ALP SERPL-CCNC: 48 U/L (ref 40–129)
ALT SERPL-CCNC: 18 U/L (ref 10–40)
ANION GAP SERPL CALCULATED.3IONS-SCNC: 9 MMOL/L (ref 3–16)
AST SERPL-CCNC: 20 U/L (ref 15–37)
BILIRUB SERPL-MCNC: 0.3 MG/DL (ref 0–1)
BUN SERPL-MCNC: 17 MG/DL (ref 7–20)
CALCIUM SERPL-MCNC: 9.6 MG/DL (ref 8.3–10.6)
CHLORIDE SERPL-SCNC: 106 MMOL/L (ref 99–110)
CO2 SERPL-SCNC: 28 MMOL/L (ref 21–32)
CREAT SERPL-MCNC: 0.6 MG/DL (ref 0.6–1.1)
EST. AVERAGE GLUCOSE BLD GHB EST-MCNC: 99.7 MG/DL
GFR SERPLBLD CREATININE-BSD FMLA CKD-EPI: >90 ML/MIN/{1.73_M2}
GLUCOSE SERPL-MCNC: 82 MG/DL (ref 70–99)
HBA1C MFR BLD: 5.1 %
POTASSIUM SERPL-SCNC: 4.8 MMOL/L (ref 3.5–5.1)
PROT SERPL-MCNC: 6.7 G/DL (ref 6.4–8.2)
SODIUM SERPL-SCNC: 143 MMOL/L (ref 136–145)

## 2025-05-06 ENCOUNTER — RESULTS FOLLOW-UP (OUTPATIENT)
Age: 35
End: 2025-05-06

## 2025-06-10 ENCOUNTER — OFFICE VISIT (OUTPATIENT)
Age: 35
End: 2025-06-10

## 2025-06-10 VITALS
DIASTOLIC BLOOD PRESSURE: 72 MMHG | HEART RATE: 76 BPM | WEIGHT: 161.4 LBS | BODY MASS INDEX: 27.7 KG/M2 | OXYGEN SATURATION: 98 % | RESPIRATION RATE: 18 BRPM | SYSTOLIC BLOOD PRESSURE: 124 MMHG

## 2025-06-10 DIAGNOSIS — L63.9 ALOPECIA AREATA: Primary | ICD-10-CM

## 2025-06-10 DIAGNOSIS — F51.4 NIGHT TERROR: ICD-10-CM

## 2025-06-10 DIAGNOSIS — F32.9 REACTIVE DEPRESSION: ICD-10-CM

## 2025-06-10 DIAGNOSIS — F07.81 POST CONCUSSION SYNDROME: ICD-10-CM

## 2025-06-10 DIAGNOSIS — G44.329 CHRONIC POST-CONCUSSION HEADACHE: ICD-10-CM

## 2025-06-10 DIAGNOSIS — F41.9 ANXIETY: ICD-10-CM

## 2025-06-10 PROCEDURE — 99214 OFFICE O/P EST MOD 30 MIN: CPT

## 2025-06-10 PROCEDURE — G2211 COMPLEX E/M VISIT ADD ON: HCPCS

## 2025-06-10 PROCEDURE — 36415 COLL VENOUS BLD VENIPUNCTURE: CPT

## 2025-06-10 RX ORDER — NAPROXEN 250 MG/1
500 TABLET ORAL 2 TIMES DAILY WITH MEALS
Qty: 120 TABLET | Refills: 0 | Status: SHIPPED | OUTPATIENT
Start: 2025-06-10 | End: 2025-07-10

## 2025-06-10 RX ORDER — DULOXETIN HYDROCHLORIDE 30 MG/1
30 CAPSULE, DELAYED RELEASE ORAL DAILY
Qty: 90 CAPSULE | Refills: 1 | Status: SHIPPED | OUTPATIENT
Start: 2025-06-10 | End: 2025-06-25

## 2025-06-10 RX ORDER — PRAZOSIN HYDROCHLORIDE 2 MG/1
2 CAPSULE ORAL NIGHTLY
Qty: 90 CAPSULE | Refills: 0 | Status: SHIPPED | OUTPATIENT
Start: 2025-06-10 | End: 2025-09-08

## 2025-06-10 NOTE — PROGRESS NOTES
[facility]  [address]  [facility] [address]  Dept: [phone]  Dept Fax: [phone]  Loc: [phone]      Visit type: Established patient    Encounter Start Time: 11:12 AM EDT  Encounter End Time: 11:27 AM EDT     100% of the time was spent with the patient today, discussing their symptoms, conducting an examination, reviewing the patient's diagnostic test results, and counseling    Reason for Visit: Follow-up (4 week follow up. Does have no concerns.)      Assessment and Plan         Assessment & Plan  Alopecia areata  Uncontrolled  The condition could be attributed to autoimmune disorders or genetic predisposition, although the latter is less likely in this case. Stress is also a potential contributing factor. The possibility of syphilis was considered but deemed unlikely due to the absence of patchy hair loss. The condition does not align with triangular alopecia, trichotillomania, or a fungal infection, suggesting an autoimmune etiology.  DIAGNOSTIC STUDIES:  - CBC  - TSH  - CMP  - FSH  - LH  - Testosterone  - CELENA  - DHEA-S  - Rheumatoid factor  - ESR  - CRP  TREATMENT:  - Referral to dermatology for further evaluation  FUTURE PLANS:  - If lab results indicate an issue, the treatment plan will be adjusted accordingly. If no issues are found, a skin biopsy will be considered for more detailed information.    Fatigue  Uncontrolled  Reports significant fatigue, sleeping from 9 PM to 7 AM and taking naps during the day. This could be related to thyroid dysfunction or other underlying conditions.  DIAGNOSTIC STUDIES:  - Thyroid function test (TSH)  MONITORING STUDIES:  - Monitoring for any additional symptoms that may arise.    Medication management  Stable  Refills for naproxen and Cymbalta have been provided. The patient is advised to contact the office if additional refills are needed.  TREATMENT:  - Naproxen refill  - Cymbalta refill  - Monitoring the

## 2025-06-11 LAB
ALBUMIN SERPL-MCNC: 4.5 G/DL (ref 3.4–5)
ALBUMIN/GLOB SERPL: 1.7 {RATIO} (ref 1.1–2.2)
ALP SERPL-CCNC: 58 U/L (ref 40–129)
ALT SERPL-CCNC: 22 U/L (ref 10–40)
ANA SER QL IA: NEGATIVE
ANION GAP SERPL CALCULATED.3IONS-SCNC: 10 MMOL/L (ref 3–16)
AST SERPL-CCNC: 23 U/L (ref 15–37)
BASOPHILS # BLD: 0.1 K/UL (ref 0–0.2)
BASOPHILS NFR BLD: 0.8 %
BILIRUB SERPL-MCNC: <0.2 MG/DL (ref 0–1)
BUN SERPL-MCNC: 22 MG/DL (ref 7–20)
CALCIUM SERPL-MCNC: 10.4 MG/DL (ref 8.3–10.6)
CHLORIDE SERPL-SCNC: 105 MMOL/L (ref 99–110)
CO2 SERPL-SCNC: 26 MMOL/L (ref 21–32)
CREAT SERPL-MCNC: 0.6 MG/DL (ref 0.6–1.1)
CRP SERPL-MCNC: <3 MG/L (ref 0–5.1)
DEPRECATED RDW RBC AUTO: 13.3 % (ref 12.4–15.4)
EOSINOPHIL # BLD: 0.2 K/UL (ref 0–0.6)
EOSINOPHIL NFR BLD: 3.1 %
ERYTHROCYTE [SEDIMENTATION RATE] IN BLOOD BY WESTERGREN METHOD: 11 MM/HR (ref 0–20)
FSH SERPL-ACNC: 5.6 MIU/ML
GFR SERPLBLD CREATININE-BSD FMLA CKD-EPI: >90 ML/MIN/{1.73_M2}
GLUCOSE SERPL-MCNC: 86 MG/DL (ref 70–99)
HCT VFR BLD AUTO: 38.1 % (ref 36–48)
HGB BLD-MCNC: 12.8 G/DL (ref 12–16)
LH SERPL-ACNC: 18.4 MIU/ML
LYMPHOCYTES # BLD: 1.7 K/UL (ref 1–5.1)
LYMPHOCYTES NFR BLD: 21.6 %
MCH RBC QN AUTO: 31 PG (ref 26–34)
MCHC RBC AUTO-ENTMCNC: 33.6 G/DL (ref 31–36)
MCV RBC AUTO: 92.4 FL (ref 80–100)
MONOCYTES # BLD: 0.8 K/UL (ref 0–1.3)
MONOCYTES NFR BLD: 10.5 %
NEUTROPHILS # BLD: 5.1 K/UL (ref 1.7–7.7)
NEUTROPHILS NFR BLD: 64 %
PLATELET # BLD AUTO: 428 K/UL (ref 135–450)
PMV BLD AUTO: 8.8 FL (ref 5–10.5)
POTASSIUM SERPL-SCNC: 4.8 MMOL/L (ref 3.5–5.1)
PROT SERPL-MCNC: 7.1 G/DL (ref 6.4–8.2)
RBC # BLD AUTO: 4.12 M/UL (ref 4–5.2)
RHEUMATOID FACT SER IA-ACNC: <10 IU/ML
SODIUM SERPL-SCNC: 141 MMOL/L (ref 136–145)
TSH SERPL DL<=0.005 MIU/L-ACNC: 2.6 UIU/ML (ref 0.27–4.2)
WBC # BLD AUTO: 8 K/UL (ref 4–11)

## 2025-06-12 LAB
DHEA-S SERPL-MCNC: 393 UG/DL (ref 98.8–340)
SHBG SERPL-SCNC: 49 NMOL/L (ref 25–122)
TESTOST FREE SERPL-MCNC: 5.9 PG/ML (ref 1.3–9.2)
TESTOST SERPL-MCNC: 42 NG/DL (ref 8–48)

## 2025-06-16 ENCOUNTER — RESULTS FOLLOW-UP (OUTPATIENT)
Age: 35
End: 2025-06-16

## 2025-06-25 ENCOUNTER — OFFICE VISIT (OUTPATIENT)
Age: 35
End: 2025-06-25

## 2025-06-25 VITALS
RESPIRATION RATE: 16 BRPM | DIASTOLIC BLOOD PRESSURE: 76 MMHG | OXYGEN SATURATION: 97 % | WEIGHT: 162.2 LBS | HEART RATE: 82 BPM | SYSTOLIC BLOOD PRESSURE: 118 MMHG | BODY MASS INDEX: 27.84 KG/M2

## 2025-06-25 DIAGNOSIS — F41.0 PANIC DISORDER: Primary | ICD-10-CM

## 2025-06-25 DIAGNOSIS — F41.9 ANXIETY: ICD-10-CM

## 2025-06-25 DIAGNOSIS — L63.9 ALOPECIA AREATA: ICD-10-CM

## 2025-06-25 DIAGNOSIS — F07.81 POST CONCUSSION SYNDROME: ICD-10-CM

## 2025-06-25 DIAGNOSIS — F32.9 REACTIVE DEPRESSION: ICD-10-CM

## 2025-06-25 PROCEDURE — 99214 OFFICE O/P EST MOD 30 MIN: CPT

## 2025-06-25 PROCEDURE — G2211 COMPLEX E/M VISIT ADD ON: HCPCS

## 2025-06-25 RX ORDER — DULOXETIN HYDROCHLORIDE 60 MG/1
60 CAPSULE, DELAYED RELEASE ORAL DAILY
Qty: 30 CAPSULE | Refills: 0 | Status: SHIPPED | OUTPATIENT
Start: 2025-06-25 | End: 2025-07-25

## 2025-06-25 NOTE — PROGRESS NOTES
[facility]  [address]  [facility] [address]  Dept: [phone]  Dept Fax: [phone]  Loc: [phone]      Visit type: Established patient    Encounter Start Time: 3:10 PM EDT  Encounter End Time: 3:22 PM EDT     100% of the time was spent with the patient today, discussing their symptoms, conducting an examination, reviewing the patient's diagnostic test results, and counseling    Reason for Visit: Follow-up (Follow up. No concerns)      Assessment and Plan         Assessment & Plan  Depression  Uncontrolled  Worsening due to cumulative stress, symptoms of anhedonia, hypersomnia, and weight gain.  DIAGNOSTIC STUDIES:  - CBC  - Thyroid  - Comprehensive metabolic panel  - FSH  - LH  - Testosterone  - CELENA  - Rheumatoid factor  - Sedimentation rate  - C-reactive protein  - DHEA-sulfate  TREATMENT:  - Medication dosage increased to 60 mg, with a regimen of 30 mg in the morning and 30 mg in the evening for the initial 1 to 2 weeks  - Prescription for the increased dosage sent to pharmacy  - If adverse effects occur, revert to 30 mg dosage and contact the office immediately  - Advised to increase food intake, particularly proteins and carbohydrates, to help manage fatigue and weight gain    Anxiety  Uncontrolled  Worsening due to cumulative stress.  DIAGNOSTIC STUDIES:  - CBC  - Thyroid  - Comprehensive metabolic panel  - FSH  - LH  - Testosterone  - CELENA  - Rheumatoid factor  - Sedimentation rate  - C-reactive protein  - DHEA-sulfate  TREATMENT:  - Medication dosage increased to 60 mg, with a regimen of 30 mg in the morning and 30 mg in the evening for the initial 1 to 2 weeks  - Prescription for the increased dosage sent to pharmacy  - If adverse effects occur, revert to 30 mg dosage and contact the office immediately  - Advised to continue current coping strategies and seek support if needed    Fatigue  Uncontrolled  Likely related to inadequate food intake and

## (undated) DEVICE — REDUCER: Brand: ENDOWRIST

## (undated) DEVICE — Z DISCONTINUED (USE MFG CAT MVABO)  TUBING GAS SAMPLING STD 6.5 FT FEMALE CONN SMRT CAPNOLINE

## (undated) DEVICE — CANNULA SEAL

## (undated) DEVICE — BLADE CLIPPER GEN PURP NS

## (undated) DEVICE — SEALANT TISS 10 CC FIBRIN VISTASEAL

## (undated) DEVICE — SUREFORM 45: Brand: SUREFORM

## (undated) DEVICE — APPLICATOR MEDICATED 26 CC SOLUTION HI LT ORNG CHLORAPREP

## (undated) DEVICE — SEAL

## (undated) DEVICE — STAPLER 60: Brand: SUREFORM

## (undated) DEVICE — FORCEPS BX L240CM JAW DIA2.8MM L CAP W/ NDL MIC MESH TOOTH

## (undated) DEVICE — SOLUTION IRRIG 1000ML STRL H2O USP PLAS POUR BTL

## (undated) DEVICE — GLOVE SURG SZ 6 THK91MIL LTX FREE SYN POLYISOPRENE ANTI

## (undated) DEVICE — SUTURE ETHBND EXCEL SZ 0 L36IN NONABSORBABLE GRN SH L26MM X524H

## (undated) DEVICE — ADHESIVE SKIN CLSR 0.7ML TOP DERMBND ADV

## (undated) DEVICE — STAPLER 60 RELOAD GREEN: Brand: SUREFORM

## (undated) DEVICE — Z DUPLICATE USE 2517136 APPLICATOR LAP 45 CM 2 FLX VISTASEAL

## (undated) DEVICE — DUAL LUMEN STOMACH TUBE: Brand: SALEM SUMP

## (undated) DEVICE — COLUMN DRAPE

## (undated) DEVICE — VESSEL SEALER EXTEND: Brand: ENDOWRIST

## (undated) DEVICE — SOLUTION IV 1000ML 0.9% SOD CHL FOR IRRIG PLAS CONT

## (undated) DEVICE — SUTURE VCRL SZ 4-0 L18IN ABSRB UD L19MM PS-2 3/8 CIR PRIM J496H

## (undated) DEVICE — STAPLER 60 RELOAD BLUE: Brand: SUREFORM

## (undated) DEVICE — ELECTRODE ES AD CRDLSS PT RET REM POLYHESIVE

## (undated) DEVICE — SUTURE VCRL SZ 3-0 L27IN ABSRB VLT L26MM SH 1/2 CIR J316H

## (undated) DEVICE — EXCEL 10FT (3.05 M) INSUFFLATION TUBING SET WITH 0.1 MICRON FILTER: Brand: EXCEL

## (undated) DEVICE — ARM DRAPE

## (undated) DEVICE — BLADELESS OBTURATOR: Brand: WECK VISTA

## (undated) DEVICE — SUTURE SZ 0 27IN 5/8 CIR UR-6  TAPER PT VIOLET ABSRB VICRYL J603H